# Patient Record
Sex: MALE | Race: WHITE | NOT HISPANIC OR LATINO | Employment: OTHER | URBAN - METROPOLITAN AREA
[De-identification: names, ages, dates, MRNs, and addresses within clinical notes are randomized per-mention and may not be internally consistent; named-entity substitution may affect disease eponyms.]

---

## 2019-05-16 ENCOUNTER — OFFICE VISIT (OUTPATIENT)
Dept: NEUROSURGERY | Facility: CLINIC | Age: 79
End: 2019-05-16

## 2019-05-16 VITALS
DIASTOLIC BLOOD PRESSURE: 65 MMHG | BODY MASS INDEX: 24.96 KG/M2 | HEART RATE: 67 BPM | WEIGHT: 159 LBS | SYSTOLIC BLOOD PRESSURE: 121 MMHG | HEIGHT: 67 IN

## 2019-05-16 DIAGNOSIS — M48.062 SPINAL STENOSIS, LUMBAR REGION, WITH NEUROGENIC CLAUDICATION: ICD-10-CM

## 2019-05-16 DIAGNOSIS — M51.36 DDD (DEGENERATIVE DISC DISEASE), LUMBAR: Primary | ICD-10-CM

## 2019-05-16 PROCEDURE — 99204 OFFICE O/P NEW MOD 45 MIN: CPT | Performed by: PHYSICIAN ASSISTANT

## 2019-05-16 NOTE — PROGRESS NOTES
Subjective   Patient ID: Boni Hernandez is a 78 y.o. male is being seen for consultation today at the request of Mark Abraham MD  For back and bilateral leg pain.  He denies any cause or injury.  He has not had any treatments.  Mr. Hernandez takes Tylenol 500 mg QID for pain.     Back Pain   This is a chronic problem. The current episode started more than 1 year ago. The problem occurs intermittently. The problem has been gradually worsening since onset. The pain is present in the lumbar spine. The quality of the pain is described as aching. Radiates to: both lower legs  The pain is at a severity of 6/10. The pain is moderate. The pain is the same all the time. Exacerbated by: walking  Associated symptoms include leg pain, numbness and tingling. Pertinent negatives include no bladder incontinence, bowel incontinence, perianal numbness or weakness. He has tried nothing for the symptoms.   Leg Pain    The incident occurred more than 1 week ago. There was no injury mechanism. The pain is present in the left leg and right leg. The quality of the pain is described as aching. The pain is at a severity of 9/10. The pain is severe. The pain has been worsening since onset. Associated symptoms include numbness and tingling. Treatments tried: stents in both legs. The treatment provided no relief.       The following portions of the patient's history were reviewed and updated as appropriate: allergies, current medications, past family history, past medical history, past social history, past surgical history and problem list.    Review of Systems   HENT: Positive for hearing loss.    Gastrointestinal: Negative for bowel incontinence.   Genitourinary: Negative for bladder incontinence and difficulty urinating.   Musculoskeletal: Positive for back pain (bilateral leg pain ).   Neurological: Positive for tingling and numbness. Negative for weakness.   Hematological: Bruises/bleeds easily.   All other systems reviewed and are  negative.      Objective   Physical Exam   Constitutional: He is oriented to person, place, and time. He appears well-developed and well-nourished.   HENT:   Head: Normocephalic and atraumatic.   Right Ear: External ear normal.   Left Ear: External ear normal.   Eyes: Conjunctivae and EOM are normal. Pupils are equal, round, and reactive to light. Right eye exhibits no discharge. Left eye exhibits no discharge.   Neck: Normal range of motion. Neck supple. No tracheal deviation present.   Cardiovascular: Intact distal pulses.   Pulmonary/Chest: Effort normal. No stridor. No respiratory distress.   Musculoskeletal: Normal range of motion. He exhibits no edema, tenderness or deformity.   Neurological: He is alert and oriented to person, place, and time. He has normal strength and normal reflexes. He displays no atrophy, no tremor and normal reflexes. No cranial nerve deficit or sensory deficit. He exhibits normal muscle tone. He displays a negative Romberg sign. He displays no seizure activity. Coordination and gait normal.   No long tract signs   Skin: Skin is warm and dry.   Psychiatric: He has a normal mood and affect. His behavior is normal. Judgment and thought content normal.   Nursing note and vitals reviewed.    Neurologic Exam     Mental Status   Oriented to person, place, and time.     Cranial Nerves     CN III, IV, VI   Pupils are equal, round, and reactive to light.  Extraocular motions are normal.     Motor Exam     Strength   Strength 5/5 throughout.       Assessment/Plan   Independent Review of Radiographic Studies:    I did review the lumbar spine MRI from [facility] on April 26, 2019.  It does show multilevel disc degeneration with a degenerative anterolisthesis at L4-L5.  There is multilevel facet arthropathy, ligamentum flavum hypertrophy and disc bulging with severe central stenosis at L4-L5 with severe bilateral foraminal narrowing at that level as well as severe bilateral  foraminal narrowing at L5-S1 and moderate bilateral foraminal narrowing at L3-L4.  Medical Decision Making:    Mr. Hernandez was referred to us by Dr. Abraham for a 5 to 10-year history of low back pain with bilateral buttock and lateral leg pain that began without accident or injury.  He does have a fairly extensive cardiac history and had cardiac stents as well as carotid endarterectomy about 2 years ago.  He is on Plavix and aspirin.  These are prescribed by Dr. Abraham.  He has never had any treatment.  The back and leg pain are quite severe with any prolonged standing or walking and do improve when sitting or laying down.  Leg pain is most bothersome.  He denies any focal weakness or bowel or bladder incontinence or significant numbness or tingling.    Did review the MRI with the patient and his wife and explained that his symptoms are certainly secondary to both the degenerative change as well as the spinal stenosis.  His radicular symptoms are most bothersome and he understands that surgery could be considered but given that he does not have any deficits I would recommend trying epidurals first.  We discussed the risks of bleeding, infection and headache.  He will need to call Dr. Abraham to get clearance to stop his aspirin and Plavix.  Once he obtains that clearance he will call us back and we can schedule lumbar epidurals and have him follow-up in about 2 months later.  If the epidural injections do not help then a myelogram and potential surgery would be the next step.  Boni was seen today for back pain and leg pain.    Diagnoses and all orders for this visit:    DDD (degenerative disc disease), lumbar    Spinal stenosis, lumbar region, with neurogenic claudication      Return if symptoms worsen or fail to improve, for pt to call after speaking with cardiologist.

## 2019-05-17 ENCOUNTER — TELEPHONE (OUTPATIENT)
Dept: NEUROSURGERY | Facility: CLINIC | Age: 79
End: 2019-05-17

## 2019-05-17 DIAGNOSIS — M51.36 DDD (DEGENERATIVE DISC DISEASE), LUMBAR: Primary | ICD-10-CM

## 2019-07-23 ENCOUNTER — OFFICE VISIT (OUTPATIENT)
Dept: NEUROSURGERY | Facility: CLINIC | Age: 79
End: 2019-07-23

## 2019-07-23 VITALS
HEIGHT: 67 IN | BODY MASS INDEX: 24.96 KG/M2 | WEIGHT: 159 LBS | DIASTOLIC BLOOD PRESSURE: 65 MMHG | HEART RATE: 66 BPM | SYSTOLIC BLOOD PRESSURE: 135 MMHG

## 2019-07-23 DIAGNOSIS — M51.36 DDD (DEGENERATIVE DISC DISEASE), LUMBAR: ICD-10-CM

## 2019-07-23 DIAGNOSIS — M48.062 SPINAL STENOSIS, LUMBAR REGION, WITH NEUROGENIC CLAUDICATION: Primary | ICD-10-CM

## 2019-07-23 PROCEDURE — 99213 OFFICE O/P EST LOW 20 MIN: CPT | Performed by: PHYSICIAN ASSISTANT

## 2019-07-23 RX ORDER — PANTOPRAZOLE SODIUM 40 MG/1
40 TABLET, DELAYED RELEASE ORAL DAILY
COMMUNITY
Start: 2019-05-20

## 2019-07-23 RX ORDER — ISOSORBIDE MONONITRATE 30 MG/1
TABLET, EXTENDED RELEASE ORAL
COMMUNITY
Start: 2019-06-27 | End: 2019-07-23 | Stop reason: SDUPTHER

## 2019-08-27 ENCOUNTER — TELEPHONE (OUTPATIENT)
Dept: NEUROSURGERY | Facility: CLINIC | Age: 79
End: 2019-08-27

## 2019-08-27 NOTE — TELEPHONE ENCOUNTER
Per pt at last visit surgery option was discussed and pt was told to call back when/if he decides on having surgery. Patient would like to speak with someone to discuss having surgery..Per Amy note pt will need to get updated imaging.    Thanks

## 2019-10-02 NOTE — PROGRESS NOTES
Subjective   Patient ID: Boni Hernandez is a 79 y.o. male is here today for follow-up. He last seen Amy 7/23/2019 for back and bilateral leg pain.  Today he is being seen for back and bilateral leg pain with numbness and tingling.    History of Present Illness     This patient has been having pain in his back with radiation into his legs.  This is been going on for about a year or so.  It is located in both of his legs and is not worse on one side or the other.  He has no difficulty with bowel or bladder control or other associated symptoms.  He has been treated with epidural blocks and medications.  Nothing has provided relief.    The following portions of the patient's history were reviewed and updated as appropriate: allergies, current medications, past family history, past medical history, past social history, past surgical history and problem list.    Review of Systems   Respiratory: Negative for chest tightness and shortness of breath.    Cardiovascular: Negative for chest pain.   Musculoskeletal: Positive for back pain.        Bilateral leg pain   Neurological: Positive for numbness.        Positive for tingling   All other systems reviewed and are negative.      Objective   Physical Exam   Constitutional: He is oriented to person, place, and time. He appears well-developed and well-nourished.   Eyes: EOM are normal. Pupils are equal, round, and reactive to light.   Neurological: He is oriented to person, place, and time. He has a normal Finger-Nose-Finger Test and a normal Heel to Shin Test. Gait normal.   Reflex Scores:       Tricep reflexes are 2+ on the right side and 2+ on the left side.       Bicep reflexes are 2+ on the right side and 2+ on the left side.       Brachioradialis reflexes are 2+ on the right side and 2+ on the left side.       Patellar reflexes are 2+ on the right side and 2+ on the left side.       Achilles reflexes are 2+ on the right side and 2+ on the left side.  Psychiatric: His  speech is normal.     Neurologic Exam     Mental Status   Oriented to person, place, and time.   Registration of memory: Good recent and remote memory.   Attention: normal. Concentration: normal.   Speech: speech is normal   Level of consciousness: alert  Knowledge: consistent with education.     Cranial Nerves     CN II   Visual fields full to confrontation.   Visual acuity: normal    CN III, IV, VI   Pupils are equal, round, and reactive to light.  Extraocular motions are normal.     CN V   Facial sensation intact.   Right corneal reflex: normal  Left corneal reflex: normal    CN VII   Facial expression full, symmetric.   Right facial weakness: none  Left facial weakness: none    CN VIII   Hearing: intact    CN IX, X   Palate: symmetric    CN XI   Right sternocleidomastoid strength: normal  Left sternocleidomastoid strength: normal    CN XII   Tongue: not atrophic  Tongue deviation: none    Motor Exam   Muscle bulk: normal  Right arm tone: normal  Left arm tone: normal  Right leg tone: normal  Left leg tone: normal    Strength   Strength 5/5 except as noted.     Sensory Exam   Light touch normal.     Gait, Coordination, and Reflexes     Gait  Gait: normal    Coordination   Finger to nose coordination: normal  Heel to shin coordination: normal    Reflexes   Right brachioradialis: 2+  Left brachioradialis: 2+  Right biceps: 2+  Left biceps: 2+  Right triceps: 2+  Left triceps: 2+  Right patellar: 2+  Left patellar: 2+  Right achilles: 2+  Left achilles: 2+  Right : 2+  Left : 2+      Assessment/Plan   Independent Review of Radiographic Studies:      I of his lumbar spine done in April of this year.  This shows a widely patent canal with a little foraminal stenosis at L5-S1.  At L4-5 there is a spondylolisthesis and severe stenosis with a disc herniation to the right side.  L3-4 looks pretty good as does L2-3 and L1-2.    Medical Decision Making:      I told the patient I see little option at this point but  to proceed with a lumbar myelogram.  I told the patient what a myelogram involves.  I explained that there is a 50% chance of developing a bad headache and nausea as a result of the test.  I explained that there is also a very small chance of infection, seizures, and bleeding.  I explained how we would treat a post myelogram headache including bedrest, caffeinated fluids, steroids, and blood patch.  The patient does ask to proceed.    Boni was seen today for back pain.    Diagnoses and all orders for this visit:    Spinal stenosis, lumbar region, with neurogenic claudication  -     Obtain Informed Consent; Standing  -     IR Myelogram Lumbar Spine; Future  -     CT Lumbar Spine With Intrathecal Contrast; Future  -     XR Spine Lumbar Complete With Flex & Ext; Future  -     No Lab Testing Needed; Standing  -     dexamethasone (DECADRON) 4 MG tablet; Take 2 tablets by mouth Take As Directed. Take both tablets by mouth 2 hours before myelogram    Spondylolisthesis of lumbar region      Return for After radiology test.

## 2019-10-03 ENCOUNTER — OFFICE VISIT (OUTPATIENT)
Dept: NEUROSURGERY | Facility: CLINIC | Age: 79
End: 2019-10-03

## 2019-10-03 VITALS — DIASTOLIC BLOOD PRESSURE: 59 MMHG | HEART RATE: 73 BPM | SYSTOLIC BLOOD PRESSURE: 112 MMHG

## 2019-10-03 DIAGNOSIS — M48.062 SPINAL STENOSIS, LUMBAR REGION, WITH NEUROGENIC CLAUDICATION: Primary | ICD-10-CM

## 2019-10-03 DIAGNOSIS — M43.16 SPONDYLOLISTHESIS OF LUMBAR REGION: ICD-10-CM

## 2019-10-03 PROCEDURE — 99213 OFFICE O/P EST LOW 20 MIN: CPT | Performed by: NEUROLOGICAL SURGERY

## 2019-10-03 RX ORDER — DEXAMETHASONE 4 MG/1
8 TABLET ORAL TAKE AS DIRECTED
Qty: 2 TABLET | Refills: 0 | Status: SHIPPED | OUTPATIENT
Start: 2019-10-03 | End: 2019-10-17 | Stop reason: HOSPADM

## 2019-10-17 ENCOUNTER — HOSPITAL ENCOUNTER (OUTPATIENT)
Dept: GENERAL RADIOLOGY | Facility: HOSPITAL | Age: 79
Discharge: HOME | End: 2019-10-17

## 2019-10-17 ENCOUNTER — HOSPITAL ENCOUNTER (OUTPATIENT)
Dept: CT IMAGING | Facility: HOSPITAL | Age: 79
Discharge: HOME | End: 2019-10-17
Admitting: NEUROLOGICAL SURGERY

## 2019-10-17 VITALS
HEIGHT: 67 IN | HEART RATE: 63 BPM | OXYGEN SATURATION: 97 % | TEMPERATURE: 97.1 F | DIASTOLIC BLOOD PRESSURE: 61 MMHG | BODY MASS INDEX: 25.11 KG/M2 | WEIGHT: 160 LBS | SYSTOLIC BLOOD PRESSURE: 122 MMHG | RESPIRATION RATE: 16 BRPM

## 2019-10-17 DIAGNOSIS — M48.062 SPINAL STENOSIS, LUMBAR REGION, WITH NEUROGENIC CLAUDICATION: ICD-10-CM

## 2019-10-17 PROCEDURE — 63710000001 HYDROCODONE-ACETAMINOPHEN 5-325 MG TABLET: Performed by: NEUROLOGICAL SURGERY

## 2019-10-17 PROCEDURE — 62304 MYELOGRAPHY LUMBAR INJECTION: CPT

## 2019-10-17 PROCEDURE — A9270 NON-COVERED ITEM OR SERVICE: HCPCS | Performed by: NEUROLOGICAL SURGERY

## 2019-10-17 PROCEDURE — 72240 MYELOGRAPHY NECK SPINE: CPT

## 2019-10-17 PROCEDURE — 0 IOPAMIDOL 41 % SOLUTION: Performed by: NEUROLOGICAL SURGERY

## 2019-10-17 PROCEDURE — 25010000003 LIDOCAINE 1 % SOLUTION: Performed by: NEUROLOGICAL SURGERY

## 2019-10-17 PROCEDURE — 72132 CT LUMBAR SPINE W/DYE: CPT

## 2019-10-17 PROCEDURE — 72114 X-RAY EXAM L-S SPINE BENDING: CPT

## 2019-10-17 RX ORDER — LIDOCAINE HYDROCHLORIDE 10 MG/ML
10 INJECTION, SOLUTION INFILTRATION; PERINEURAL ONCE
Status: COMPLETED | OUTPATIENT
Start: 2019-10-17 | End: 2019-10-17

## 2019-10-17 RX ORDER — HYDROCODONE BITARTRATE AND ACETAMINOPHEN 5; 325 MG/1; MG/1
1 TABLET ORAL EVERY 4 HOURS PRN
Status: DISCONTINUED | OUTPATIENT
Start: 2019-10-17 | End: 2019-10-18 | Stop reason: HOSPADM

## 2019-10-17 RX ORDER — ACETAMINOPHEN 325 MG/1
650 TABLET ORAL EVERY 4 HOURS PRN
Status: DISCONTINUED | OUTPATIENT
Start: 2019-10-17 | End: 2019-10-18 | Stop reason: HOSPADM

## 2019-10-17 RX ADMIN — LIDOCAINE HYDROCHLORIDE 5 ML: 10 INJECTION, SOLUTION INFILTRATION; PERINEURAL at 07:10

## 2019-10-17 RX ADMIN — HYDROCODONE BITARTATE AND ACETAMINOPHEN 1 TABLET: 5; 325 TABLET ORAL at 07:32

## 2019-10-17 RX ADMIN — IOPAMIDOL 20 ML: 408 INJECTION, SOLUTION INTRATHECAL at 07:11

## 2019-10-17 NOTE — DISCHARGE INSTRUCTIONS
EDUCATION /DISCHARGE INSTRUCTIONS:    A myelogram is a special radiology procedure of the spinal cord, spinal nerves and other related structures.  You will be awake during the examination.  An area of your lower back will be cleansed with an antiseptic solution.  The physician will inject a numbing medication in your lower back.  While your back is numb, a needle will be placed in the lower back area.  A small amount of spinal fluid may be withdrawn and sent to the lab if ordered by your physician. While the needle is in the back, an injection of a contrast material (xray dye) will be given through the needle.  The contrast material will allow the physician to see the spinal cord and spinal nerves.  Once injected, the needle will be removed and a band aid will be placed over the injection site.  The table will be tilted during the process to allow the contrast material to flow to particular areas in the spine.  Following the injection and xrays, you will be taken to the CT scan where more pictures will be taken. After the procedure is finished, the contrast material will be absorbed by your body and eliminated through your kidneys.  The radiologist will study and interpret your myelogram and send the results to your physician.  Procedure risks of a myelogram include, but are not limited to:  *  Bleeding   *  seizure  *  Infection   *  Headache, possibly severe requiring  *  Contrast reaction      a blood patch  *  Nerve or cord injury  *  Paralysis and death  Benefits of the procedure:  *  Best examination for delineating pathology related to spinal cord compression from a    disc and/or nerve root compression  Alternatives to the procedure:  MRI - a non invasive procedure requiring intravenous contrast injection.  Cannot be done on patients with certain pacemakers or metal in the body.  MRI risks include possible reaction to the contrast material, movement of metal located in the body.Benefit to MRI:  Non-invasive  and usually painless procedure.  THIS EDUCATION INFORMATION WAS REVIEWED PRIOR TO PROCEDURE AND CONSENT. Patient initials________________Time______0640____________    24 hour rest period ends ____1100________________.  Important information following your myelogram:  * ACTIVITY:   *  Lie down with your head elevated no more than 2 pillows high today & tonight  *  Sit up to eat your meals and use the restroom, otherwise, lie down.  *  Remain less active for two to three days.  *  Do not drive for 48 hours following a myelogram  *  You may remove the bandage and shower in the morning  *  Increase your fluids for the next 24 hours.  Caffeinated drinks are encouraged.    Resume taking blood thinner or aspirin on _resume Plavix (clopidergrel) on 10/18/19 after 1100____    CALL YOUR PHYSICIAN FOR THE FOLLOWING:  * Pain at the injection site  * Reddness, swelling, bruising or drainage at the injection site.  * A fever by mouth of 101.0 or any new symptoms  Headaches are a common side effect after a myelogram.  If you get a headache, you should stay flat in bed and drink plenty of fluids. If the headache persist and does not go away with rest/medication, CALL Dr. Alex at [phone]

## 2019-10-17 NOTE — NURSING NOTE
To car by W/C with Jessica REA.  No problems or concerns noted.  Granddaughter is driving he and his wife home.

## 2019-10-18 ENCOUNTER — TELEPHONE (OUTPATIENT)
Dept: INTERVENTIONAL RADIOLOGY/VASCULAR | Facility: HOSPITAL | Age: 79
End: 2019-10-18

## 2019-10-28 NOTE — PROGRESS NOTES
Subjective   Patient ID: Boni Hernandez is a 79 y.o. male is here today for follow-up with a new Lumbar Myelogram that was ordered at his last office visit 10/3/2019 for back and bilateral leg pain with numbness and tingling.  Patient tolerated procedure fine.  Patient states that his symptoms of low back and bilateral leg pain are unchanged.  He has numbness/tingling bilateral legs and weakness in both legs    History of Present Illness     This patient returns today.  He continues with pain as well as numbness and tingling in his legs and some generalized weakness.    The following portions of the patient's history were reviewed and updated as appropriate: allergies, current medications, past family history, past medical history, past social history, past surgical history and problem list.    Review of Systems   Musculoskeletal: Positive for arthralgias and back pain. Negative for gait problem and myalgias.   Neurological: Positive for weakness and numbness.   All other systems reviewed and are negative.      Objective   Physical Exam   Constitutional: He is oriented to person, place, and time. He appears well-developed and well-nourished.   Neurological: He is oriented to person, place, and time.     Neurologic Exam     Mental Status   Oriented to person, place, and time.       Assessment/Plan   Independent Review of Radiographic Studies:      I reviewed his plain films, myelogram, and CT scan myself.  The plain films show multilevel degenerative disease.  There does appear to be a grade 1 spondylolisthesis of L4 on L5 but no evidence of abnormal movement on flexion and extension.  On the myelogram itself there is an initial subtotal block at the L4-5 level.  On standing films the contrast still does not pass very well.  The other levels mostly look okay.  On the post myelographic CT scan the lower thoracic spine and upper lumbar spine look okay down to the L2 vertebral body.  At L2-3 there is a little right  lateral recess stenosis but not severe.  L3-4 also shows some lateral recess stenosis with very mild.  L4-5 shows no contrast at all in the contrast really never did pass even on the CAT scan at L5-S1 which generally looks okay.  There may be some foraminal stenosis particularly on the left.    Medical Decision Making:      I told the patient and his wife about the imaging.  I told him that from my point of view I would tend to proceed with a L4-5 laminectomy and fusion if he wishes to do anything at all.  I do not see much option to that in terms of nonsurgical treatment.  I told the patient about the risks, complications and expected outcome of the lumbar surgery.  I explained that there was an 80% chance of getting rid of the pain in the leg.  I explained that there would still be back pain after the surgery.  Initially this will be quite severe but will improve over time.  There is a 2 or 3% chance of infection, bleeding, CSF leak, damage to the nerve as a result of surgery, paralysis, as well as anesthetic risk.  There is a 10% chance of recurrent problems.  There is a 10% chance of nonunion or failure of the instrumentation.  We discussed the postoperative hospital and home course.  The patient does ask proceed.    He will need to be scheduled for a: Lumbar 4 to lumbar 5 laminectomy with a posterior lateral fusion and instrumentation and possible interbody fusion    Boni was seen today for back pain, leg pain, extremity weakness and numbness.    Diagnoses and all orders for this visit:    Spondylolisthesis of lumbar region    Spinal stenosis, lumbar region, with neurogenic claudication      Return for 2-3 week post op, Recheck and call after treatment or consultation.

## 2019-10-29 ENCOUNTER — PREP FOR SURGERY (OUTPATIENT)
Dept: OTHER | Facility: HOSPITAL | Age: 79
End: 2019-10-29

## 2019-10-29 ENCOUNTER — OFFICE VISIT (OUTPATIENT)
Dept: NEUROSURGERY | Facility: CLINIC | Age: 79
End: 2019-10-29

## 2019-10-29 VITALS
HEIGHT: 67 IN | SYSTOLIC BLOOD PRESSURE: 130 MMHG | BODY MASS INDEX: 25.11 KG/M2 | DIASTOLIC BLOOD PRESSURE: 64 MMHG | WEIGHT: 160 LBS | HEART RATE: 74 BPM

## 2019-10-29 DIAGNOSIS — M43.16 SPONDYLOLISTHESIS OF LUMBAR REGION: Primary | ICD-10-CM

## 2019-10-29 DIAGNOSIS — M48.062 SPINAL STENOSIS, LUMBAR REGION, WITH NEUROGENIC CLAUDICATION: ICD-10-CM

## 2019-10-29 DIAGNOSIS — M48.062 SPINAL STENOSIS, LUMBAR REGION, WITH NEUROGENIC CLAUDICATION: Primary | ICD-10-CM

## 2019-10-29 PROCEDURE — 99213 OFFICE O/P EST LOW 20 MIN: CPT | Performed by: NEUROLOGICAL SURGERY

## 2019-10-29 RX ORDER — CEFAZOLIN SODIUM 2 G/100ML
2 INJECTION, SOLUTION INTRAVENOUS ONCE
Status: CANCELLED | OUTPATIENT
Start: 2019-11-13 | End: 2019-10-29

## 2019-11-06 ENCOUNTER — APPOINTMENT (OUTPATIENT)
Dept: PREADMISSION TESTING | Facility: HOSPITAL | Age: 79
End: 2019-11-06

## 2019-11-06 VITALS
WEIGHT: 161.5 LBS | OXYGEN SATURATION: 98 % | TEMPERATURE: 97.7 F | HEART RATE: 70 BPM | SYSTOLIC BLOOD PRESSURE: 104 MMHG | DIASTOLIC BLOOD PRESSURE: 62 MMHG | HEIGHT: 67 IN | BODY MASS INDEX: 25.35 KG/M2

## 2019-11-06 LAB
ANION GAP SERPL CALCULATED.3IONS-SCNC: 6.6 MMOL/L (ref 5–15)
BUN BLD-MCNC: 7 MG/DL (ref 8–23)
BUN/CREAT SERPL: 7.4 (ref 7–25)
CALCIUM SPEC-SCNC: 8.8 MG/DL (ref 8.6–10.5)
CHLORIDE SERPL-SCNC: 101 MMOL/L (ref 98–107)
CO2 SERPL-SCNC: 28.4 MMOL/L (ref 22–29)
CREAT BLD-MCNC: 0.94 MG/DL (ref 0.76–1.27)
DEPRECATED RDW RBC AUTO: 42 FL (ref 37–54)
ERYTHROCYTE [DISTWIDTH] IN BLOOD BY AUTOMATED COUNT: 12.4 % (ref 12.3–15.4)
GFR SERPL CREATININE-BSD FRML MDRD: 77 ML/MIN/1.73
GLUCOSE BLD-MCNC: 101 MG/DL (ref 65–99)
HCT VFR BLD AUTO: 41.3 % (ref 37.5–51)
HGB BLD-MCNC: 13.2 G/DL (ref 13–17.7)
MCH RBC QN AUTO: 29.3 PG (ref 26.6–33)
MCHC RBC AUTO-ENTMCNC: 32 G/DL (ref 31.5–35.7)
MCV RBC AUTO: 91.8 FL (ref 79–97)
PLATELET # BLD AUTO: 238 10*3/MM3 (ref 140–450)
PMV BLD AUTO: 9.7 FL (ref 6–12)
POTASSIUM BLD-SCNC: 4.6 MMOL/L (ref 3.5–5.2)
RBC # BLD AUTO: 4.5 10*6/MM3 (ref 4.14–5.8)
SODIUM BLD-SCNC: 136 MMOL/L (ref 136–145)
WBC NRBC COR # BLD: 5.63 10*3/MM3 (ref 3.4–10.8)

## 2019-11-06 PROCEDURE — 80048 BASIC METABOLIC PNL TOTAL CA: CPT | Performed by: NEUROLOGICAL SURGERY

## 2019-11-06 PROCEDURE — 85027 COMPLETE CBC AUTOMATED: CPT | Performed by: NEUROLOGICAL SURGERY

## 2019-11-06 PROCEDURE — 36415 COLL VENOUS BLD VENIPUNCTURE: CPT

## 2019-11-06 RX ORDER — ACETAMINOPHEN 500 MG
1000 TABLET ORAL EVERY 6 HOURS PRN
COMMUNITY

## 2019-11-06 RX ORDER — CHLORHEXIDINE GLUCONATE 500 MG/1
1 CLOTH TOPICAL TAKE AS DIRECTED
Status: ON HOLD | COMMUNITY
End: 2019-11-13

## 2019-11-06 RX ORDER — ISOSORBIDE MONONITRATE 30 MG/1
60 TABLET, EXTENDED RELEASE ORAL DAILY
COMMUNITY
End: 2022-12-13 | Stop reason: HOSPADM

## 2019-11-06 RX ORDER — NITROGLYCERIN 0.4 MG/1
0.4 TABLET SUBLINGUAL
COMMUNITY

## 2019-11-06 NOTE — DISCHARGE INSTRUCTIONS
Take the following medications the morning of surgery with a small sip of water:    ISOSORBIDE, PANTOPRAZOLE AND ACETAMINOPHEN IF NEEDED    Arrive to hospital on your day of surgery at 11:30 AM.    General Instructions:  • Do not eat solid food after midnight the night before surgery.  • You may drink clear liquids day of surgery but must stop at least one hour before your hospital arrival time.  • It is beneficial for you to have a clear drink that contains carbohydrates the day of surgery.  We suggest a 12 to 20 ounce bottle of Gatorade or Powerade for non-diabetic patients or a 12 to 20 ounce bottle of G2 or Powerade Zero for diabetic patients. (Pediatric patients, are not advised to drink a 12 to 20 ounce carbohydrate drink)    Clear liquids are liquids you can see through.  Nothing red in color.     Plain water                               Sports drinks  Sodas                                   Gelatin (Jell-O)  Fruit juices without pulp such as white grape juice and apple juice  Popsicles that contain no fruit or yogurt  Tea or coffee (no cream or milk added)  Gatorade / Powerade  G2 / Powerade Zero    • Infants may have breast milk up to four hours before surgery.  • Infants drinking formula may drink formula up to six hours before surgery.   • Patients who avoid smoking, chewing tobacco and alcohol for 4 weeks prior to surgery have a reduced risk of post-operative complications.  Quit smoking as many days before surgery as you can.  • Do not smoke, use chewing tobacco or drink alcohol the day of surgery.   • If applicable bring your C-PAP/ BI-PAP machine.  • Bring any papers given to you in the doctor’s office.  • Wear clean comfortable clothes.  • Do not wear contact lenses, false eyelashes or make-up.  Bring a case for your glasses.   • Bring crutches or walker if applicable.  • Remove all piercings.  Leave jewelry and any other valuables at home.  • Hair extensions with metal clips must be removed prior  to surgery.  • The Pre-Admission Testing nurse will instruct you to bring medications if unable to obtain an accurate list in Pre-Admission Testing.        If you were given a blood bank ID arm band remember to bring it with you the day of surgery.    Preventing a Surgical Site Infection:  • For 2 to 3 days before surgery, avoid shaving with a razor because the razor can irritate skin and make it easier to develop an infection.    • Any areas of open skin can increase the risk of a post-operative wound infection by allowing bacteria to enter and travel throughout the body.  Notify your surgeon if you have any skin wounds / rashes even if it is not near the expected surgical site.  The area will need assessed to determine if surgery should be delayed until it is healed.  • The night prior to surgery sleep in a clean bed with clean clothing.  Do not allow pets to sleep with you.  • Shower on the morning of surgery using a fresh bar of anti-bacterial soap (such as Dial) and clean washcloth.  Dry with a clean towel and dress in clean clothing.  • Ask your surgeon if you will be receiving antibiotics prior to surgery.  • Make sure you, your family, and all healthcare providers clean their hands with soap and water or an alcohol based hand  before caring for you or your wound.    Day of surgery:  Your arrival time is approximately two hours before your scheduled surgery time.  Upon arrival, a Pre-op nurse and Anesthesiologist will review your health history, obtain vital signs, and answer questions you may have.  The only belongings needed at this time will be a list of your home medications and if applicable your C-PAP/BI-PAP machine.  If you are staying overnight your family can leave the rest of your belongings in the car and bring them to your room later.  A Pre-op nurse will start an IV and you may receive medication in preparation for surgery, including something to help you relax.  Your family will be able  to see you in the Pre-op area.  Two visitors at a time will be allowed in the Pre-op room.  While you are in surgery your family should notify the waiting room  if they leave the waiting room area and provide a contact phone number.    Please be aware that surgery does come with discomfort.  We want to make every effort to control your discomfort so please discuss any uncontrolled symptoms with your nurse.   Your doctor will most likely have prescribed pain medications.      If you are going home after surgery you will receive individualized written care instructions before being discharged.  A responsible adult must drive you to and from the hospital on the day of your surgery and stay with you for 24 hours.    If you are staying overnight following surgery, you will be transported to your hospital room following the recovery period.  [facility] has all private rooms.    You have received a list of surgical assistants for your reference.  If you have any questions please call Pre-Admission Testing at [phone].  Deductibles and co-payments are collected on the day of service. Please be prepared to pay the required co-pay, deductible or deposit on the day of service as defined by your plan.    2% CHLORAHEXIDINE GLUCONATE* CLOTH  Preparing or “prepping” skin before surgery can reduce the risk of infection at the surgical site. To make the process easier, [facility] has chosen disposable cloths moistened with a rinse-free, 2% Chlorhexidine Gluconate (CHG) antiseptic solution. The steps below outline the prepping process and should be carefully followed.        Use the prep cloth on the area that is circled in the diagram             Directions Night before Surgery  1) Shower using a fresh bar of anti-bacterial soap (such as Dial) and clean washcloth.  Use a clean towel to completely dry your skin.  2) Do not use any lotions, oils or creams on your skin.  3) Open the package and  remove 1 cloth, wipe your skin for 30 seconds in a circular motion.  Allow to dry for 3 minutes.  4) Repeat #3 with second cloth.  5) Do not touch your eyes, ears, or mouth with the prep cloth.  6) Allow the wet prep solution to air dry.  7) Discard the prep cloth and wash your hands with soap and water.   8) Dress in clean bed clothes and sleep on fresh clean bed sheets.   9) You may experience some temporary itching after the prep.    Directions Day of Surgery  1) Repeat steps 1,2,3,4,5,6,7, and 9.   2) Dress in clean clothes before coming to the hospital.

## 2019-11-13 ENCOUNTER — APPOINTMENT (OUTPATIENT)
Dept: GENERAL RADIOLOGY | Facility: HOSPITAL | Age: 79
End: 2019-11-13

## 2019-11-13 ENCOUNTER — ANESTHESIA EVENT (OUTPATIENT)
Dept: PERIOP | Facility: HOSPITAL | Age: 79
End: 2019-11-13

## 2019-11-13 ENCOUNTER — ANESTHESIA (OUTPATIENT)
Dept: PERIOP | Facility: HOSPITAL | Age: 79
End: 2019-11-13

## 2019-11-13 ENCOUNTER — HOSPITAL ENCOUNTER (INPATIENT)
Facility: HOSPITAL | Age: 79
LOS: 2 days | Discharge: HOME | End: 2019-11-15
Attending: NEUROLOGICAL SURGERY | Admitting: NEUROLOGICAL SURGERY

## 2019-11-13 DIAGNOSIS — R53.1 GENERALIZED WEAKNESS: ICD-10-CM

## 2019-11-13 DIAGNOSIS — M43.16 SPONDYLOLISTHESIS OF LUMBAR REGION: Primary | ICD-10-CM

## 2019-11-13 DIAGNOSIS — M48.062 SPINAL STENOSIS, LUMBAR REGION, WITH NEUROGENIC CLAUDICATION: ICD-10-CM

## 2019-11-13 PROCEDURE — 0SG007J FUSION OF LUMBAR VERTEBRAL JOINT WITH AUTOLOGOUS TISSUE SUBSTITUTE, POSTERIOR APPROACH, ANTERIOR COLUMN, OPEN APPROACH: ICD-10-PCS | Performed by: NEUROLOGICAL SURGERY

## 2019-11-13 PROCEDURE — C1713 ANCHOR/SCREW BN/BN,TIS/BN: HCPCS | Performed by: NEUROLOGICAL SURGERY

## 2019-11-13 PROCEDURE — 25010000002 FENTANYL CITRATE (PF) 100 MCG/2ML SOLUTION: Performed by: ANESTHESIOLOGY

## 2019-11-13 PROCEDURE — 63047 LAM FACETEC & FORAMOT LUMBAR: CPT | Performed by: NEUROLOGICAL SURGERY

## 2019-11-13 PROCEDURE — 94799 UNLISTED PULMONARY SVC/PX: CPT

## 2019-11-13 PROCEDURE — 25010000002 MIDAZOLAM PER 1 MG: Performed by: ANESTHESIOLOGY

## 2019-11-13 PROCEDURE — 25010000002 FENTANYL CITRATE (PF) 100 MCG/2ML SOLUTION: Performed by: NURSE ANESTHETIST, CERTIFIED REGISTERED

## 2019-11-13 PROCEDURE — 22840 INSERT SPINE FIXATION DEVICE: CPT | Performed by: NEUROLOGICAL SURGERY

## 2019-11-13 PROCEDURE — 72100 X-RAY EXAM L-S SPINE 2/3 VWS: CPT

## 2019-11-13 PROCEDURE — 25810000003 SODIUM CHLORIDE 0.9 % WITH KCL 20 MEQ 20-0.9 MEQ/L-% SOLUTION: Performed by: NEUROLOGICAL SURGERY

## 2019-11-13 PROCEDURE — 76000 FLUOROSCOPY <1 HR PHYS/QHP: CPT

## 2019-11-13 PROCEDURE — 25010000002 PHENYLEPHRINE PER 1 ML: Performed by: NURSE ANESTHETIST, CERTIFIED REGISTERED

## 2019-11-13 PROCEDURE — 25010000002 PROPOFOL 10 MG/ML EMULSION: Performed by: NURSE ANESTHETIST, CERTIFIED REGISTERED

## 2019-11-13 PROCEDURE — 25010000002 ONDANSETRON PER 1 MG: Performed by: NURSE ANESTHETIST, CERTIFIED REGISTERED

## 2019-11-13 PROCEDURE — 25010000002 MORPHINE PER 10 MG: Performed by: NEUROLOGICAL SURGERY

## 2019-11-13 PROCEDURE — 25010000003 CEFAZOLIN IN DEXTROSE 2-4 GM/100ML-% SOLUTION: Performed by: NEUROLOGICAL SURGERY

## 2019-11-13 PROCEDURE — 22612 ARTHRD PST TQ 1NTRSPC LUMBAR: CPT | Performed by: NEUROLOGICAL SURGERY

## 2019-11-13 PROCEDURE — 25010000002 DEXAMETHASONE PER 1 MG: Performed by: NURSE ANESTHETIST, CERTIFIED REGISTERED

## 2019-11-13 DEVICE — SET SCREW 5440030 4.75 TI NS BRK OFF
Type: IMPLANTABLE DEVICE | Site: SPINE LUMBAR | Status: FUNCTIONAL
Brand: CD HORIZON® SPINAL SYSTEM

## 2019-11-13 DEVICE — SSC BONE WAX
Type: IMPLANTABLE DEVICE | Status: FUNCTIONAL
Brand: SSC BONE WAX

## 2019-11-13 DEVICE — GRFT BONE MAGNIFUSE PC 1.75X5CM: Type: IMPLANTABLE DEVICE | Status: FUNCTIONAL

## 2019-11-13 RX ORDER — HYDRALAZINE HYDROCHLORIDE 20 MG/ML
5 INJECTION INTRAMUSCULAR; INTRAVENOUS
Status: DISCONTINUED | OUTPATIENT
Start: 2019-11-13 | End: 2019-11-13 | Stop reason: HOSPADM

## 2019-11-13 RX ORDER — DIPHENHYDRAMINE HYDROCHLORIDE 50 MG/ML
12.5 INJECTION INTRAMUSCULAR; INTRAVENOUS
Status: DISCONTINUED | OUTPATIENT
Start: 2019-11-13 | End: 2019-11-13 | Stop reason: HOSPADM

## 2019-11-13 RX ORDER — SODIUM CHLORIDE 0.9 % (FLUSH) 0.9 %
10 SYRINGE (ML) INJECTION AS NEEDED
Status: DISCONTINUED | OUTPATIENT
Start: 2019-11-13 | End: 2019-11-15 | Stop reason: HOSPADM

## 2019-11-13 RX ORDER — ONDANSETRON 2 MG/ML
4 INJECTION INTRAMUSCULAR; INTRAVENOUS ONCE AS NEEDED
Status: DISCONTINUED | OUTPATIENT
Start: 2019-11-13 | End: 2019-11-13 | Stop reason: HOSPADM

## 2019-11-13 RX ORDER — LIDOCAINE HYDROCHLORIDE 10 MG/ML
0.5 INJECTION, SOLUTION EPIDURAL; INFILTRATION; INTRACAUDAL; PERINEURAL ONCE AS NEEDED
Status: DISCONTINUED | OUTPATIENT
Start: 2019-11-13 | End: 2019-11-13 | Stop reason: HOSPADM

## 2019-11-13 RX ORDER — FAMOTIDINE 10 MG/ML
20 INJECTION, SOLUTION INTRAVENOUS ONCE
Status: COMPLETED | OUTPATIENT
Start: 2019-11-13 | End: 2019-11-13

## 2019-11-13 RX ORDER — ONDANSETRON 4 MG/1
4 TABLET, FILM COATED ORAL EVERY 6 HOURS PRN
Status: DISCONTINUED | OUTPATIENT
Start: 2019-11-13 | End: 2019-11-15 | Stop reason: HOSPADM

## 2019-11-13 RX ORDER — SODIUM CHLORIDE, SODIUM LACTATE, POTASSIUM CHLORIDE, CALCIUM CHLORIDE 600; 310; 30; 20 MG/100ML; MG/100ML; MG/100ML; MG/100ML
9 INJECTION, SOLUTION INTRAVENOUS CONTINUOUS
Status: DISCONTINUED | OUTPATIENT
Start: 2019-11-13 | End: 2019-11-13

## 2019-11-13 RX ORDER — DIPHENHYDRAMINE HCL 25 MG
25 CAPSULE ORAL
Status: DISCONTINUED | OUTPATIENT
Start: 2019-11-13 | End: 2019-11-13 | Stop reason: HOSPADM

## 2019-11-13 RX ORDER — CEFAZOLIN SODIUM 2 G/100ML
2 INJECTION, SOLUTION INTRAVENOUS EVERY 8 HOURS
Status: COMPLETED | OUTPATIENT
Start: 2019-11-13 | End: 2019-11-14

## 2019-11-13 RX ORDER — ISOSORBIDE MONONITRATE 30 MG/1
30 TABLET, EXTENDED RELEASE ORAL DAILY
Status: DISCONTINUED | OUTPATIENT
Start: 2019-11-13 | End: 2019-11-15 | Stop reason: HOSPADM

## 2019-11-13 RX ORDER — PROMETHAZINE HYDROCHLORIDE 25 MG/ML
12.5 INJECTION, SOLUTION INTRAMUSCULAR; INTRAVENOUS ONCE AS NEEDED
Status: DISCONTINUED | OUTPATIENT
Start: 2019-11-13 | End: 2019-11-13 | Stop reason: HOSPADM

## 2019-11-13 RX ORDER — PROPOFOL 10 MG/ML
VIAL (ML) INTRAVENOUS AS NEEDED
Status: DISCONTINUED | OUTPATIENT
Start: 2019-11-13 | End: 2019-11-13 | Stop reason: SURG

## 2019-11-13 RX ORDER — ROCURONIUM BROMIDE 10 MG/ML
INJECTION, SOLUTION INTRAVENOUS AS NEEDED
Status: DISCONTINUED | OUTPATIENT
Start: 2019-11-13 | End: 2019-11-13 | Stop reason: SURG

## 2019-11-13 RX ORDER — MIDAZOLAM HYDROCHLORIDE 1 MG/ML
1 INJECTION INTRAMUSCULAR; INTRAVENOUS
Status: DISCONTINUED | OUTPATIENT
Start: 2019-11-13 | End: 2019-11-13 | Stop reason: HOSPADM

## 2019-11-13 RX ORDER — MIDAZOLAM HYDROCHLORIDE 1 MG/ML
2 INJECTION INTRAMUSCULAR; INTRAVENOUS
Status: DISCONTINUED | OUTPATIENT
Start: 2019-11-13 | End: 2019-11-13 | Stop reason: HOSPADM

## 2019-11-13 RX ORDER — CARVEDILOL 6.25 MG/1
6.25 TABLET ORAL EVERY EVENING
Status: DISCONTINUED | OUTPATIENT
Start: 2019-11-13 | End: 2019-11-15 | Stop reason: HOSPADM

## 2019-11-13 RX ORDER — ONDANSETRON 2 MG/ML
INJECTION INTRAMUSCULAR; INTRAVENOUS AS NEEDED
Status: DISCONTINUED | OUTPATIENT
Start: 2019-11-13 | End: 2019-11-13 | Stop reason: SURG

## 2019-11-13 RX ORDER — CEFAZOLIN SODIUM 2 G/100ML
2 INJECTION, SOLUTION INTRAVENOUS ONCE
Status: COMPLETED | OUTPATIENT
Start: 2019-11-13 | End: 2019-11-13

## 2019-11-13 RX ORDER — EPHEDRINE SULFATE 50 MG/ML
5 INJECTION, SOLUTION INTRAVENOUS ONCE AS NEEDED
Status: DISCONTINUED | OUTPATIENT
Start: 2019-11-13 | End: 2019-11-13 | Stop reason: HOSPADM

## 2019-11-13 RX ORDER — SODIUM CHLORIDE 0.9 % (FLUSH) 0.9 %
3 SYRINGE (ML) INJECTION EVERY 12 HOURS SCHEDULED
Status: DISCONTINUED | OUTPATIENT
Start: 2019-11-13 | End: 2019-11-13 | Stop reason: HOSPADM

## 2019-11-13 RX ORDER — ONDANSETRON 2 MG/ML
4 INJECTION INTRAMUSCULAR; INTRAVENOUS EVERY 6 HOURS PRN
Status: DISCONTINUED | OUTPATIENT
Start: 2019-11-13 | End: 2019-11-15 | Stop reason: HOSPADM

## 2019-11-13 RX ORDER — SODIUM CHLORIDE AND POTASSIUM CHLORIDE 150; 900 MG/100ML; MG/100ML
100 INJECTION, SOLUTION INTRAVENOUS CONTINUOUS
Status: DISCONTINUED | OUTPATIENT
Start: 2019-11-13 | End: 2019-11-15

## 2019-11-13 RX ORDER — HYDROMORPHONE HYDROCHLORIDE 1 MG/ML
0.5 INJECTION, SOLUTION INTRAMUSCULAR; INTRAVENOUS; SUBCUTANEOUS
Status: DISCONTINUED | OUTPATIENT
Start: 2019-11-13 | End: 2019-11-13 | Stop reason: HOSPADM

## 2019-11-13 RX ORDER — SODIUM CHLORIDE 0.9 % (FLUSH) 0.9 %
3-10 SYRINGE (ML) INJECTION AS NEEDED
Status: DISCONTINUED | OUTPATIENT
Start: 2019-11-13 | End: 2019-11-13 | Stop reason: HOSPADM

## 2019-11-13 RX ORDER — MORPHINE SULFATE 2 MG/ML
2 INJECTION, SOLUTION INTRAMUSCULAR; INTRAVENOUS EVERY 4 HOURS PRN
Status: DISCONTINUED | OUTPATIENT
Start: 2019-11-13 | End: 2019-11-15 | Stop reason: HOSPADM

## 2019-11-13 RX ORDER — LABETALOL HYDROCHLORIDE 5 MG/ML
5 INJECTION, SOLUTION INTRAVENOUS
Status: DISCONTINUED | OUTPATIENT
Start: 2019-11-13 | End: 2019-11-13 | Stop reason: HOSPADM

## 2019-11-13 RX ORDER — FLUMAZENIL 0.1 MG/ML
0.2 INJECTION INTRAVENOUS AS NEEDED
Status: DISCONTINUED | OUTPATIENT
Start: 2019-11-13 | End: 2019-11-13 | Stop reason: HOSPADM

## 2019-11-13 RX ORDER — ATORVASTATIN CALCIUM 80 MG/1
80 TABLET, FILM COATED ORAL NIGHTLY
Status: DISCONTINUED | OUTPATIENT
Start: 2019-11-13 | End: 2019-11-15 | Stop reason: HOSPADM

## 2019-11-13 RX ORDER — ACETAMINOPHEN 500 MG
1000 TABLET ORAL EVERY 6 HOURS PRN
Status: DISCONTINUED | OUTPATIENT
Start: 2019-11-13 | End: 2019-11-15 | Stop reason: HOSPADM

## 2019-11-13 RX ORDER — PROMETHAZINE HYDROCHLORIDE 25 MG/ML
6.25 INJECTION, SOLUTION INTRAMUSCULAR; INTRAVENOUS
Status: DISCONTINUED | OUTPATIENT
Start: 2019-11-13 | End: 2019-11-13 | Stop reason: HOSPADM

## 2019-11-13 RX ORDER — ACETAMINOPHEN 325 MG/1
650 TABLET ORAL ONCE AS NEEDED
Status: DISCONTINUED | OUTPATIENT
Start: 2019-11-13 | End: 2019-11-13 | Stop reason: HOSPADM

## 2019-11-13 RX ORDER — LISINOPRIL 10 MG/1
10 TABLET ORAL DAILY
Status: DISCONTINUED | OUTPATIENT
Start: 2019-11-13 | End: 2019-11-15 | Stop reason: HOSPADM

## 2019-11-13 RX ORDER — DEXAMETHASONE SODIUM PHOSPHATE 10 MG/ML
INJECTION INTRAMUSCULAR; INTRAVENOUS AS NEEDED
Status: DISCONTINUED | OUTPATIENT
Start: 2019-11-13 | End: 2019-11-13 | Stop reason: SURG

## 2019-11-13 RX ORDER — TAMSULOSIN HYDROCHLORIDE 0.4 MG/1
0.4 CAPSULE ORAL NIGHTLY
Status: DISCONTINUED | OUTPATIENT
Start: 2019-11-13 | End: 2019-11-15 | Stop reason: HOSPADM

## 2019-11-13 RX ORDER — FENTANYL CITRATE 50 UG/ML
50 INJECTION, SOLUTION INTRAMUSCULAR; INTRAVENOUS
Status: DISCONTINUED | OUTPATIENT
Start: 2019-11-13 | End: 2019-11-13 | Stop reason: HOSPADM

## 2019-11-13 RX ORDER — NALOXONE HCL 0.4 MG/ML
0.2 VIAL (ML) INJECTION AS NEEDED
Status: DISCONTINUED | OUTPATIENT
Start: 2019-11-13 | End: 2019-11-13 | Stop reason: HOSPADM

## 2019-11-13 RX ORDER — LIDOCAINE HYDROCHLORIDE 20 MG/ML
INJECTION, SOLUTION INFILTRATION; PERINEURAL AS NEEDED
Status: DISCONTINUED | OUTPATIENT
Start: 2019-11-13 | End: 2019-11-13 | Stop reason: SURG

## 2019-11-13 RX ORDER — NALOXONE HCL 0.4 MG/ML
0.4 VIAL (ML) INJECTION
Status: DISCONTINUED | OUTPATIENT
Start: 2019-11-13 | End: 2019-11-15 | Stop reason: HOSPADM

## 2019-11-13 RX ORDER — NITROGLYCERIN 0.4 MG/1
0.4 TABLET SUBLINGUAL
Status: DISCONTINUED | OUTPATIENT
Start: 2019-11-13 | End: 2019-11-15 | Stop reason: HOSPADM

## 2019-11-13 RX ORDER — HYDROCODONE BITARTRATE AND ACETAMINOPHEN 5; 325 MG/1; MG/1
1 TABLET ORAL EVERY 4 HOURS PRN
Status: DISCONTINUED | OUTPATIENT
Start: 2019-11-13 | End: 2019-11-15 | Stop reason: HOSPADM

## 2019-11-13 RX ORDER — PROMETHAZINE HYDROCHLORIDE 25 MG/1
25 SUPPOSITORY RECTAL ONCE AS NEEDED
Status: DISCONTINUED | OUTPATIENT
Start: 2019-11-13 | End: 2019-11-13 | Stop reason: HOSPADM

## 2019-11-13 RX ORDER — PROMETHAZINE HYDROCHLORIDE 25 MG/1
25 TABLET ORAL ONCE AS NEEDED
Status: DISCONTINUED | OUTPATIENT
Start: 2019-11-13 | End: 2019-11-13 | Stop reason: HOSPADM

## 2019-11-13 RX ORDER — SODIUM CHLORIDE 0.9 % (FLUSH) 0.9 %
3 SYRINGE (ML) INJECTION EVERY 12 HOURS SCHEDULED
Status: DISCONTINUED | OUTPATIENT
Start: 2019-11-13 | End: 2019-11-15 | Stop reason: HOSPADM

## 2019-11-13 RX ADMIN — ONDANSETRON 4 MG: 2 INJECTION INTRAMUSCULAR; INTRAVENOUS at 13:06

## 2019-11-13 RX ADMIN — HYDROCODONE BITARTRATE AND ACETAMINOPHEN 1 TABLET: 5; 325 TABLET ORAL at 17:24

## 2019-11-13 RX ADMIN — ISOSORBIDE MONONITRATE 30 MG: 30 TABLET ORAL at 18:10

## 2019-11-13 RX ADMIN — LIDOCAINE HYDROCHLORIDE 100 MG: 20 INJECTION, SOLUTION INFILTRATION; PERINEURAL at 13:00

## 2019-11-13 RX ADMIN — LISINOPRIL 10 MG: 10 TABLET ORAL at 18:10

## 2019-11-13 RX ADMIN — FENTANYL CITRATE 50 MCG: 50 INJECTION INTRAMUSCULAR; INTRAVENOUS at 14:46

## 2019-11-13 RX ADMIN — DEXAMETHASONE SODIUM PHOSPHATE 4 MG: 10 INJECTION INTRAMUSCULAR; INTRAVENOUS at 13:06

## 2019-11-13 RX ADMIN — POTASSIUM CHLORIDE AND SODIUM CHLORIDE 100 ML/HR: 900; 150 INJECTION, SOLUTION INTRAVENOUS at 17:25

## 2019-11-13 RX ADMIN — MORPHINE SULFATE 2 MG: 2 INJECTION, SOLUTION INTRAMUSCULAR; INTRAVENOUS at 18:11

## 2019-11-13 RX ADMIN — CEFAZOLIN SODIUM 1 G: 2 INJECTION, SOLUTION INTRAVENOUS at 15:14

## 2019-11-13 RX ADMIN — FAMOTIDINE 20 MG: 10 INJECTION INTRAVENOUS at 12:22

## 2019-11-13 RX ADMIN — TAMSULOSIN HYDROCHLORIDE 0.4 MG: 0.4 CAPSULE ORAL at 21:39

## 2019-11-13 RX ADMIN — ROCURONIUM BROMIDE 50 MG: 10 INJECTION INTRAVENOUS at 13:00

## 2019-11-13 RX ADMIN — MIDAZOLAM 1 MG: 1 INJECTION INTRAMUSCULAR; INTRAVENOUS at 12:22

## 2019-11-13 RX ADMIN — ATORVASTATIN CALCIUM 80 MG: 80 TABLET, FILM COATED ORAL at 21:39

## 2019-11-13 RX ADMIN — PHENYLEPHRINE HYDROCHLORIDE 100 MCG: 10 INJECTION INTRAVENOUS at 14:56

## 2019-11-13 RX ADMIN — FENTANYL CITRATE 50 MCG: 50 INJECTION INTRAMUSCULAR; INTRAVENOUS at 15:00

## 2019-11-13 RX ADMIN — CARVEDILOL 6.25 MG: 6.25 TABLET, FILM COATED ORAL at 18:09

## 2019-11-13 RX ADMIN — PROPOFOL 200 MG: 10 INJECTION, EMULSION INTRAVENOUS at 13:00

## 2019-11-13 RX ADMIN — CEFAZOLIN SODIUM 2 G: 2 INJECTION, SOLUTION INTRAVENOUS at 13:05

## 2019-11-13 RX ADMIN — SUGAMMADEX 400 MG: 100 INJECTION, SOLUTION INTRAVENOUS at 15:27

## 2019-11-13 RX ADMIN — SODIUM CHLORIDE, POTASSIUM CHLORIDE, SODIUM LACTATE AND CALCIUM CHLORIDE 9 ML/HR: 600; 310; 30; 20 INJECTION, SOLUTION INTRAVENOUS at 12:22

## 2019-11-13 RX ADMIN — FENTANYL CITRATE 50 MCG: 50 INJECTION, SOLUTION INTRAMUSCULAR; INTRAVENOUS at 15:58

## 2019-11-13 NOTE — ANESTHESIA PREPROCEDURE EVALUATION
Anesthesia Evaluation     Patient summary reviewed and Nursing notes reviewed                Airway   Mallampati: II  Neck ROM: limited  Dental    (+) lower dentures and upper dentures    Pulmonary    (+) a smoker Former,   Cardiovascular     ECG reviewed  Rhythm: regular  Rate: normal    (+) hypertension, past MI  >12 months, CAD, cardiac stents more than 12 months ago hyperlipidemia,       Neuro/Psych- negative ROS  GI/Hepatic/Renal/Endo    (+)  GERD,      Musculoskeletal     Abdominal    Substance History - negative use     OB/GYN negative ob/gyn ROS         Other   arthritis,                      Anesthesia Plan    ASA 3     general     intravenous induction     Anesthetic plan, all risks, benefits, and alternatives have been provided, discussed and informed consent has been obtained with: patient.

## 2019-11-13 NOTE — BRIEF OP NOTE
LUMBAR DISCECTOMY POSTERIOR WITH FUSION INSTRUMENTATION  Progress Note    Boni Hernandez  11/13/2019    Pre-op Diagnosis:   Spondylolisthesis of lumbar region [M43.16]       Post-Op Diagnosis Codes:     * Spondylolisthesis of lumbar region [M43.16]    Procedure/CPT® Codes:      Procedure(s):  Lumbar 4 to lumbar 5 laminectomy with a posterior lateral fusion and instrumentation    Surgeon(s):  Matthew Alex MD    Anesthesia: General    Staff:   Circulator: Tracy Fwoler RN; Daniel Rai Jr., RN  Scrub Person: Mandy Christiansen; Michel Montiel  Vendor Representative: Kanu Queen  Assistant: Anupama Bazan CSA    Estimated Blood Loss: 100ml    Urine Voided: 450 mL    Specimens:                None          Drains:   Closed/Suction Drain 1 Back Bulb 10 Fr. (Active)       Urethral Catheter Non-latex 16 Fr. (Active)       Findings: Severe stenosis    Complications: None      Matthew Alex MD     Date: 11/13/2019  Time: 3:30 PM

## 2019-11-13 NOTE — ANESTHESIA POSTPROCEDURE EVALUATION
Patient: Boni Hernandez    Procedure Summary     Date:  11/13/19 Room / Location:  [facility] OR [address] [place] [address] MAIN OR    Anesthesia Start:  1254 Anesthesia Stop:  1552    Procedure:  Lumbar 4 to lumbar 5 laminectomy with a posterior lateral fusion and instrumentation (N/A Spine Lumbar) Diagnosis:       Spondylolisthesis of lumbar region      (Spondylolisthesis of lumbar region [M43.16])    Surgeon:  Matthew Alex MD Provider:  Sakina Perez MD    Anesthesia Type:  general ASA Status:  3          Anesthesia Type: general  Last vitals  BP   146/66 (11/13/19 1620)   Temp   36.9 °C (98.4 °F) (11/13/19 1548)   Pulse   72 (11/13/19 1620)   Resp   16 (11/13/19 1620)     SpO2   100 % (11/13/19 1620)     Post Anesthesia Care and Evaluation    Patient location during evaluation: bedside  Patient participation: complete - patient participated  Level of consciousness: awake and alert  Pain management: adequate  Airway patency: patent  Anesthetic complications: No anesthetic complications  PONV Status: controlled  Cardiovascular status: blood pressure returned to baseline and acceptable  Respiratory status: acceptable  Hydration status: acceptable

## 2019-11-13 NOTE — OP NOTE
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    Assistant: Anupama Bazan CFA who was instrumental in providing help with hemostasis, visualization of neural structures and retraction of neural structures.    Indications for procedure: This patient has been having severe pain in the legs and the back.  Imaging shows severe stenosis with a grade 1 spondylolisthesis at L4-5.  There is been no improvement with nonsurgical treatment in the patient elected to proceed with surgery.    Operative summary: After induction of general anesthesia with intravenous agents patient was intubated and placed on the operating table in the prone position.  All pressure points were padded including peripheral points of entrapment.  The back was then prepped with chloraprep.  After a 3 minute drying time the back was draped with Ioban, towels, half sheets and a split sheet.    An incision was then made in the skin in the midline.  The dissection was carried down to the thoracolumbar fascia which was opened in the midline. The muscles were stripped off the L4 laminar arch and spinous process and the L5 laminar arch and spinous process as well as out laterally over the transverse processes and lateral facet.  Once those were exposed they were decorticated with the Shruti drill.  The lateral canal was then lined with mastergraft.  Following this the entire spinous process of L4 was removed along with the respective laminar arch by thinning it down with the Shruti drill and cracking it directly back off the dura.  The superior spinous process and laminar arch of L5 were also removed.  Once the dura was exposed the remainder of the  operation was done under the high power magnification of the operating microscope using microsurgical technique and microsurgical instrumentation.  The Penfield 4 was used to dissect the dura and the nerve root off of the medial pedicle of L4 and L5 bilaterally.  A combination of the Kerrison's and the Shruti drill were used to open the lateral recess out to the level of the medial pedicle.  This was both at the top and the lower decompressed level.  Once this was done the medial aspect of the facet was removed in order to open the superior foramen.  Following this each of the 4 nerve roots were checked to be sure they were decompressed.  They all appeared to be completely decompressed.    Once this was done the locally harvested bone was placed out laterally over the mastergraft, the lateral facet and the transverse processes.   holes were then drilled through the pedicles of L4 and L5 bilaterally.  Following this 6.5 mm by 50 mm screws were placed into the pedicles of L4 bilaterally and 6.5 mm by 50 mm screws were placed bilaterally at L5.  Intraoperative x-ray confirmed good placement of the hardware and they were then connected with a emily and compressed.  Following this the area was copiously irrigated with bacitracin solution and then another dose of Kefzol was given prior to closure.  The paraspinous musculature was injected with 100 cc of 1/8% Marcaine with 1-200,000 epinephrine solution.  A drain was placed in the epidural space and tunneled subcutaneously.  The incision was then closed in layers bedrest and the patient was taken to the recovery room in stable condition.  There were no apparent complications and sponge instrument and needle counts were correct at the end of the procedure.

## 2019-11-13 NOTE — ANESTHESIA PROCEDURE NOTES
Airway  Urgency: elective    Date/Time: 11/13/2019 1:02 PM  Airway not difficult    General Information and Staff    Patient location during procedure: OR  CRNA: Corina Villa CRNA    Indications and Patient Condition  Indications for airway management: airway protection    Preoxygenated: yes      Final Airway Details  Final airway type: endotracheal airway      Successful airway: ETT and reinforced tube  Cuffed: yes   Successful intubation technique: direct laryngoscopy  Endotracheal tube insertion site: oral  Blade: Rooney  Blade size: 2  ETT size (mm): 7.5  Cormack-Lehane Classification: grade I - full view of glottis  Placement verified by: chest auscultation and capnometry   Cuff volume (mL): 10  Measured from: lips  ETT/EBT  to lips (cm): 21  Number of attempts at approach: 1    Additional Comments  Atraumatic placement of ETT, dentition unchanged from preop.

## 2019-11-13 NOTE — PLAN OF CARE
Problem: Patient Care Overview  Goal: Plan of Care Review  Outcome: Ongoing (interventions implemented as appropriate)   11/13/19 1655 11/13/19 4116   Plan of Care Review   Progress --  improving   OTHER   Outcome Summary --  POD 0. Numbness to LLE. Dale and CALI drain in place. Due to ambulate. VSS will monitor   Coping/Psychosocial   Plan of Care Reviewed With patient;family --      Goal: Individualization and Mutuality  Outcome: Ongoing (interventions implemented as appropriate)    Goal: Discharge Needs Assessment  Outcome: Ongoing (interventions implemented as appropriate)    Goal: Interprofessional Rounds/Family Conf  Outcome: Ongoing (interventions implemented as appropriate)      Problem: Pain, Chronic (Adult)  Goal: Identify Related Risk Factors and Signs and Symptoms  Outcome: Ongoing (interventions implemented as appropriate)    Goal: Acceptable Pain/Comfort Level and Functional Ability  Outcome: Ongoing (interventions implemented as appropriate)      Problem: Fall Risk (Adult)  Goal: Identify Related Risk Factors and Signs and Symptoms  Outcome: Ongoing (interventions implemented as appropriate)    Goal: Absence of Fall  Outcome: Ongoing (interventions implemented as appropriate)

## 2019-11-14 ENCOUNTER — APPOINTMENT (OUTPATIENT)
Dept: GENERAL RADIOLOGY | Facility: HOSPITAL | Age: 79
End: 2019-11-14

## 2019-11-14 PROBLEM — N40.0 BPH (BENIGN PROSTATIC HYPERPLASIA): Status: ACTIVE | Noted: 2019-11-14

## 2019-11-14 PROBLEM — K21.9 GERD (GASTROESOPHAGEAL REFLUX DISEASE): Status: ACTIVE | Noted: 2019-11-14

## 2019-11-14 PROBLEM — E78.5 HYPERLIPIDEMIA: Status: ACTIVE | Noted: 2019-11-14

## 2019-11-14 PROBLEM — I25.10 CORONARY ARTERY DISEASE: Status: ACTIVE | Noted: 2019-11-14

## 2019-11-14 PROBLEM — I10 HTN (HYPERTENSION): Status: ACTIVE | Noted: 2019-11-14

## 2019-11-14 LAB
ALBUMIN SERPL-MCNC: 3.7 G/DL (ref 3.5–5.2)
ALBUMIN/GLOB SERPL: 1.6 G/DL
ALP SERPL-CCNC: 58 U/L (ref 39–117)
ALT SERPL W P-5'-P-CCNC: 17 U/L (ref 1–41)
ANION GAP SERPL CALCULATED.3IONS-SCNC: 11.2 MMOL/L (ref 5–15)
AST SERPL-CCNC: 22 U/L (ref 1–40)
BASOPHILS # BLD AUTO: 0.01 10*3/MM3 (ref 0–0.2)
BASOPHILS NFR BLD AUTO: 0.1 % (ref 0–1.5)
BILIRUB SERPL-MCNC: 0.3 MG/DL (ref 0.2–1.2)
BUN BLD-MCNC: 9 MG/DL (ref 8–23)
BUN/CREAT SERPL: 10 (ref 7–25)
CALCIUM SPEC-SCNC: 8.3 MG/DL (ref 8.6–10.5)
CHLORIDE SERPL-SCNC: 99 MMOL/L (ref 98–107)
CO2 SERPL-SCNC: 23.8 MMOL/L (ref 22–29)
CREAT BLD-MCNC: 0.9 MG/DL (ref 0.76–1.27)
DEPRECATED RDW RBC AUTO: 41.1 FL (ref 37–54)
EOSINOPHIL # BLD AUTO: 0 10*3/MM3 (ref 0–0.4)
EOSINOPHIL NFR BLD AUTO: 0 % (ref 0.3–6.2)
ERYTHROCYTE [DISTWIDTH] IN BLOOD BY AUTOMATED COUNT: 12.2 % (ref 12.3–15.4)
GFR SERPL CREATININE-BSD FRML MDRD: 81 ML/MIN/1.73
GLOBULIN UR ELPH-MCNC: 2.3 GM/DL
GLUCOSE BLD-MCNC: 122 MG/DL (ref 65–99)
HCT VFR BLD AUTO: 32.3 % (ref 37.5–51)
HGB BLD-MCNC: 10.8 G/DL (ref 13–17.7)
IMM GRANULOCYTES # BLD AUTO: 0.05 10*3/MM3 (ref 0–0.05)
IMM GRANULOCYTES NFR BLD AUTO: 0.5 % (ref 0–0.5)
LYMPHOCYTES # BLD AUTO: 1.39 10*3/MM3 (ref 0.7–3.1)
LYMPHOCYTES NFR BLD AUTO: 15.1 % (ref 19.6–45.3)
MCH RBC QN AUTO: 30.3 PG (ref 26.6–33)
MCHC RBC AUTO-ENTMCNC: 33.4 G/DL (ref 31.5–35.7)
MCV RBC AUTO: 90.5 FL (ref 79–97)
MONOCYTES # BLD AUTO: 0.77 10*3/MM3 (ref 0.1–0.9)
MONOCYTES NFR BLD AUTO: 8.4 % (ref 5–12)
NEUTROPHILS # BLD AUTO: 7 10*3/MM3 (ref 1.7–7)
NEUTROPHILS NFR BLD AUTO: 75.9 % (ref 42.7–76)
NRBC BLD AUTO-RTO: 0 /100 WBC (ref 0–0.2)
PLATELET # BLD AUTO: 177 10*3/MM3 (ref 140–450)
PMV BLD AUTO: 10 FL (ref 6–12)
POTASSIUM BLD-SCNC: 4.1 MMOL/L (ref 3.5–5.2)
PROT SERPL-MCNC: 6 G/DL (ref 6–8.5)
RBC # BLD AUTO: 3.57 10*6/MM3 (ref 4.14–5.8)
SODIUM BLD-SCNC: 134 MMOL/L (ref 136–145)
WBC NRBC COR # BLD: 9.22 10*3/MM3 (ref 3.4–10.8)

## 2019-11-14 PROCEDURE — 72100 X-RAY EXAM L-S SPINE 2/3 VWS: CPT

## 2019-11-14 PROCEDURE — 85025 COMPLETE CBC W/AUTO DIFF WBC: CPT | Performed by: NEUROLOGICAL SURGERY

## 2019-11-14 PROCEDURE — 99024 POSTOP FOLLOW-UP VISIT: CPT | Performed by: PHYSICIAN ASSISTANT

## 2019-11-14 PROCEDURE — 94799 UNLISTED PULMONARY SVC/PX: CPT

## 2019-11-14 PROCEDURE — 25010000003 CEFAZOLIN IN DEXTROSE 2-4 GM/100ML-% SOLUTION: Performed by: NEUROLOGICAL SURGERY

## 2019-11-14 PROCEDURE — 25810000003 SODIUM CHLORIDE 0.9 % WITH KCL 20 MEQ 20-0.9 MEQ/L-% SOLUTION: Performed by: NEUROLOGICAL SURGERY

## 2019-11-14 PROCEDURE — 80053 COMPREHEN METABOLIC PANEL: CPT | Performed by: NURSE PRACTITIONER

## 2019-11-14 PROCEDURE — 97161 PT EVAL LOW COMPLEX 20 MIN: CPT

## 2019-11-14 RX ORDER — CEFAZOLIN SODIUM 2 G/100ML
2 INJECTION, SOLUTION INTRAVENOUS ONCE
Status: DISCONTINUED | OUTPATIENT
Start: 2019-11-14 | End: 2019-11-14

## 2019-11-14 RX ADMIN — CEFAZOLIN SODIUM 2 G: 2 INJECTION, SOLUTION INTRAVENOUS at 06:38

## 2019-11-14 RX ADMIN — SODIUM CHLORIDE, PRESERVATIVE FREE 3 ML: 5 INJECTION INTRAVENOUS at 08:36

## 2019-11-14 RX ADMIN — POTASSIUM CHLORIDE AND SODIUM CHLORIDE 100 ML/HR: 900; 150 INJECTION, SOLUTION INTRAVENOUS at 16:36

## 2019-11-14 RX ADMIN — HYDROCODONE BITARTRATE AND ACETAMINOPHEN 1 TABLET: 5; 325 TABLET ORAL at 13:00

## 2019-11-14 RX ADMIN — HYDROCODONE BITARTRATE AND ACETAMINOPHEN 1 TABLET: 5; 325 TABLET ORAL at 17:02

## 2019-11-14 RX ADMIN — SODIUM CHLORIDE, PRESERVATIVE FREE 3 ML: 5 INJECTION INTRAVENOUS at 22:44

## 2019-11-14 RX ADMIN — TAMSULOSIN HYDROCHLORIDE 0.4 MG: 0.4 CAPSULE ORAL at 22:42

## 2019-11-14 RX ADMIN — CEFAZOLIN SODIUM 2 G: 2 INJECTION, SOLUTION INTRAVENOUS at 00:08

## 2019-11-14 RX ADMIN — ATORVASTATIN CALCIUM 80 MG: 80 TABLET, FILM COATED ORAL at 22:43

## 2019-11-14 RX ADMIN — HYDROCODONE BITARTRATE AND ACETAMINOPHEN 1 TABLET: 5; 325 TABLET ORAL at 22:42

## 2019-11-14 RX ADMIN — POTASSIUM CHLORIDE AND SODIUM CHLORIDE 100 ML/HR: 900; 150 INJECTION, SOLUTION INTRAVENOUS at 05:56

## 2019-11-14 RX ADMIN — CARVEDILOL 6.25 MG: 6.25 TABLET, FILM COATED ORAL at 17:02

## 2019-11-14 RX ADMIN — LISINOPRIL 10 MG: 10 TABLET ORAL at 08:36

## 2019-11-14 RX ADMIN — ISOSORBIDE MONONITRATE 30 MG: 30 TABLET ORAL at 08:36

## 2019-11-14 RX ADMIN — HYDROCODONE BITARTRATE AND ACETAMINOPHEN 1 TABLET: 5; 325 TABLET ORAL at 08:35

## 2019-11-14 NOTE — THERAPY EVALUATION
Patient Name: Boni Hernandez  : 1940    MRN: [id number]                              Today's Date: 2019       Admit Date: 2019    Visit Dx:     ICD-10-CM ICD-9-CM   1. Generalized weakness R53.1 780.79   2. Spondylolisthesis of lumbar region M43.16 738.4   3. Spinal stenosis, lumbar region, with neurogenic claudication M48.062 724.03     Patient Active Problem List   Diagnosis   • Spinal stenosis, lumbar region, with neurogenic claudication   • DDD (degenerative disc disease), lumbar   • Spondylolisthesis of lumbar region     Past Medical History:   Diagnosis Date   • BPH (benign prostatic hyperplasia)    • BPH (benign prostatic hyperplasia)    • Coronary artery disease    • GERD (gastroesophageal reflux disease)    • History of MI (myocardial infarction)     APPROX. 2 YEARS AGO, HAS STENT, FOLLOWED BY DR LOREDO, 10/21/19 STRESS TEST AT [facility]   • [place] (hard of hearing)    • HTN (hypertension)    • Hyperlipidemia    • Low back pain     RADIATES DOWN BOTH LEGS   • Spinal stenosis      Past Surgical History:   Procedure Laterality Date   • CAROTID ENDARTERECTOMY Left 2017   • CATARACT EXTRACTION WITH INTRAOCULAR LENS IMPLANT Bilateral    • CORONARY ANGIOPLASTY WITH STENT PLACEMENT  2016   • DENTAL PROCEDURE      DENTAL IMPLANTS   • EPIDURAL BLOCK     • LUMBAR DISCECTOMY FUSION INSTRUMENTATION N/A 2019    Procedure: Lumbar 4 to lumbar 5 laminectomy with a posterior lateral fusion and instrumentation;  Surgeon: Matthew Alex MD;  Location: [facility];  Service: Neurosurgery   • OTHER SURGICAL HISTORY  2015    PT STATES HE HAS HAD STENTS PUT IN BOTH LEGS.    • TONSILLECTOMY       General Information     Row Name 19 0905          PT Evaluation Time/Intention    Document Type  evaluation  -     Mode of Treatment  individual therapy;physical therapy  -     Row Name 19 09          General Information    Patient Profile Reviewed?  yes  -LH     Prior Level of Function   independent:  -     Existing Precautions/Restrictions  brace worn when out of bed;spinal  -     Barriers to Rehab  none identified  -[facility] Name 11/14/19 0905          Relationship/Environment    Lives With  spouse  -[facility] Name 11/14/19 0905          Home Main Entrance    Number of Stairs, Main Entrance  three  -[facility] Name 11/14/19 0905          Cognitive Assessment/Intervention- PT/OT    Orientation Status (Cognition)  oriented x 3  -       User Key  (r) = Recorded By, (t) = Taken By, (c) = Cosigned By    Initials Name Provider Type     Sakina Rosario, PT Physical Therapist        Mobility     [facility] Name 11/14/19 0906          Bed Mobility Assessment/Treatment    Bed Mobility Assessment/Treatment  supine-sit  -     [place] Level (Bed Mobility)  moderate assist (50% patient effort);verbal cues max cueing to complete log rolling  -[facility] Name 11/14/19 0906          Sit-Stand Transfer    Sit-Stand [place] (Transfers)  contact guard  -     Assistive Device (Sit-Stand Transfers)  walker, front-wheeled  -[facility] Name 11/14/19 0906          Gait/Stairs Assessment/Training    Gait/Stairs Assessment/Training  gait/ambulation independence  -     [place] Level (Gait)  contact guard  -     Assistive Device (Gait)  walker, front-wheeled  -     Distance in Feet (Gait)  120  -     Pattern (Gait)  step-through  -     Bilateral Gait Deviations  forward flexed posture  -       User Key  (r) = Recorded By, (t) = Taken By, (c) = Cosigned By    Initials Name Provider Type     Sakina Rosario, PT Physical Therapist        Obj/Interventions     Row Name 11/14/19 0907          General ROM    GENERAL ROM COMMENTS  [facility] WFL  -[facility] Name 11/14/19 0907          MMT (Manual Muscle Testing)    General MMT Comments  [facility] Name 11/14/19 0907          Sensory Assessment/Intervention    Sensory General Assessment  no sensation deficits identified pt reports before sx had  LLE n/t, pt reports post op it is resolved  -       User Key  (r) = Recorded By, (t) = Taken By, (c) = Cosigned By    Initials Name Provider Type     Sakina Rosario, PT Physical Therapist        Goals/Plan     [facility] Name 11/14/19 0908          Gait Training Goal 1 (PT)    Activity/Assistive Device (Gait Training Goal 1, PT)  gait (walking locomotion)  -     [place] Level (Gait Training Goal 1, PT)  supervision required  -     Distance (Gait Goal 1, PT)  250  -LH     Time Frame (Gait Training Goal 1, PT)  1 week  -[facility] Name 11/14/19 0908          Stairs Goal 1 (PT)    Activity/Assistive Device (Stairs Goal 1, PT)  stairs, all skills  -     [place] Level/Cues Needed (Stairs Goal 1, PT)  supervision required  -     Number of Stairs (Stairs Goal 1, PT)  3  -LH     Time Frame (Stairs Goal 1, PT)  1 week  -       User Key  (r) = Recorded By, (t) = Taken By, (c) = Cosigned By    Initials Name Provider Type     Sakina Rosario, PT Physical Therapist        Clinical Impression     [facility] Name 11/14/19 0908          Pain Assessment    Additional Documentation  Pain Scale: FACES Pre/Post-Treatment (Group)  -LH     Row Name 11/14/19 0908          Pain Scale: Numbers Pre/Post-Treatment    Pain Location - Orientation  incisional  -     Pain Location  back  -     Pain Intervention(s)  Medication (See MAR)  -LH     Row Name 11/14/19 0908          Pain Scale: FACES Pre/Post-Treatment    Pain: FACES Scale, Pretreatment  2-->hurts little bit  -     Pain: FACES Scale, Post-Treatment  2-->hurts little bit  -[facility] Name 11/14/19 0908          Plan of Care Review    Plan of Care Reviewed With  patient  -LH     Row Name 11/14/19 0908          Physical Therapy Clinical Impression    Criteria for Skilled Interventions Met (PT Clinical Impression)  yes  -     Rehab Potential (PT Clinical Summary)  good, to achieve stated therapy goals  -LH     Row Name 11/14/19 0908          Positioning and Restraints     Pre-Treatment Position  in bed  -     Post Treatment Position  chair  -     In Chair  sitting;exit alarm on;call light within reach;encouraged to call for assist  -       User Key  (r) = Recorded By, (t) = Taken By, (c) = Cosigned By    Initials Name Provider Type     Sakina Rosario, PT Physical Therapist        Outcome Measures     Row Name 11/14/19 0909          How much help from another person do you currently need...    Turning from your back to your side while in flat bed without using bedrails?  3  -LH     Moving from lying on back to sitting on the side of a flat bed without bedrails?  3  -LH     Moving to and from a bed to a chair (including a wheelchair)?  3  -LH     Standing up from a chair using your arms (e.g., wheelchair, bedside chair)?  3  -LH     Climbing 3-5 steps with a railing?  3  -LH     To walk in hospital room?  3  -     AM-PAC 6 Clicks Score (PT)  18  -     Row Name 11/14/19 0909          Functional Assessment    Outcome Measure Options  AM-PAC 6 Clicks Basic Mobility (PT)  -       User Key  (r) = Recorded By, (t) = Taken By, (c) = Cosigned By    Initials Name Provider Type     Sakina Rosario, PT Physical Therapist        Physical Therapy Education     Title: PT OT SLP Therapies (In Progress)     Topic: Physical Therapy (In Progress)     Point: Mobility training (Done)     Learning Progress Summary           Patient Acceptance, E, DU,NR by  at 11/14/2019  9:10 AM                   Point: Home exercise program (Done)     Learning Progress Summary           Patient Acceptance, E, DU,NR by  at 11/14/2019  9:10 AM                               User Key     Initials Effective Dates Name Provider Type [facility] access hospital 04/03/18 -  Sakina Rosario, PT Physical Therapist PT              PT Recommendation and Plan     Outcome Summary/Treatment Plan (PT)  Anticipated Discharge Disposition (PT): home with assist, home with home health  Plan of Care Reviewed With: patient  Outcome  Summary: pt presents w generalized weakness post op L4-5 lami w PLF, pt reports LLE n/t resolved post op. this date good tolerance to household ambulation CGA rwx, pain controlled. pt may benefit from skilled PT acutely for inc independence w functional mobility, gait training, for safe reintergration to home.      Time Calculation:   PT Charges     Row Name 11/14/19 0911             Time Calculation    Start Time  0832  -      Stop Time  0844  -      Time Calculation (min)  12 min  -      PT Received On  11/14/19  -      PT - Next Appointment  11/15/19  -      PT Goal Re-Cert Due Date  11/21/19  -        User Key  (r) = Recorded By, (t) = Taken By, (c) = Cosigned By    Initials Name Provider Type     Sakina Rosario, PT Physical Therapist        Therapy Charges for Today     Code Description Service Date Service Provider Modifiers Qty    17744024492 HC PT EVAL LOW COMPLEXITY 2 11/14/2019 Sakina Rosario, PT GP 1          PT G-Codes  Outcome Measure Options: AM-PAC 6 Clicks Basic Mobility (PT)  AM-PAC 6 Clicks Score (PT): 18    Sakina Rosario, PT  11/14/2019

## 2019-11-14 NOTE — PLAN OF CARE
Problem: Patient Care Overview  Goal: Plan of Care Review   11/14/19 0513   Plan of Care Review   Progress improving   OTHER   Outcome Summary pt rested through the night, less numbness to lower extremities, ambulated with staff without difficulty and tolerated well, arnulfo and f/c remains in place, will continue to moniter   Coping/Psychosocial   Plan of Care Reviewed With patient

## 2019-11-14 NOTE — PROGRESS NOTES
"Doing well. Legs much better. Has back pain as expected. Did walk some with PT.  Has banda and would like to keep it another night.    Blood pressure 112/54, pulse 81, temperature 98.7 °F (37.1 °C), temperature source Oral, resp. rate 16, height 170.2 cm (67\"), weight 72.7 kg (160 lb 6 oz), SpO2 94 %.      AA, Ox3  Incision ok  Wearing brace  CALI: 330cc/24 hrs, about 60cc this am      ..  Results from last 7 days   Lab Units 11/14/19  0508   WBC 10*3/mm3 9.22   HEMOGLOBIN g/dL 10.8*   HEMATOCRIT % 32.3*   PLATELETS 10*3/mm3 177         POD#1 s/p L4-5 lami/PLIF  HTN, Hx of MI  BPH    Keep banda, mobilize  Plan to d/c banda in am  Keep drain  CBC in am  Home tomorrow or Saturday depending on CALI volume, voiding status and pain level.     "

## 2019-11-14 NOTE — PLAN OF CARE
Problem: Patient Care Overview  Goal: Plan of Care Review   11/14/19 0910   OTHER   Outcome Summary pt presents w generalized weakness post op L4-5 lami w PLF, pt reports LLE n/t resolved post op. this date good tolerance to household ambulation CGA rwx, pain controlled. pt may benefit from skilled PT acutely for inc independence w functional mobility, gait training, for safe reintergration to home.    Coping/Psychosocial   Plan of Care Reviewed With patient

## 2019-11-14 NOTE — CONSULTS
Patient Name:  Boni Hernandez  YOB: 1940  MRN:  [id number]  Date of Admission:  11/13/2019  Date of Consult:  11/14/2019  Patient Care Team:  Eric Freed MD as PCP - General (Family Medicine)  Mark Abraham MD as Consulting Physician (Cardiology)    Inpatient Internal Medicine Consult  Consult performed by: Rocío Christopher APRN  Consult ordered by: Matthew Alex MD  Reason for consult: Evaluate status and make recommendations regarding treatment for medical management.        Subjective   History of Present Illness  Mr. Hernandez is a 79 y.o. male that has been admitted to [facility] following elective Lumbar 4 to lumbar 5 laminectomy with a posterior lateral fusion and instrumentation.  He has been admitted to an orthopedic floor following surgery and we were asked to see and assist with his medical problems, specifically relating to his HTN, HLD, CAD and BPH.  At the time of my visit he denies any chest pain, SOA, nausea, vomiting or diarrhea.  He has tolerated a diet.  He does complain of expected postoperative discomfort.  He reports being in a normal state of health leading up to surgery. The patient reports a known history of CAD. He states had stents placed a couple years ago and normally sees Dr. Mark Abraham for his Cardiologist. He denies any recent chest pain or dyspnea. It appears he did have a stress test 10/21/19 that was negative for ischemia and he had a calculated EF around 77%. The patient states he additionally takes his blood pressures medications as prescribed. He states he normally takes a couple in the morning and one in the evening, but is unsure which ones at what times. He denies any dizziness or lightheadedness and has been up with physical therapy this morning. He is having back pain related to his surgery, but states his previous leg pain is now gone. He reports a history of BPH, but denies any known retention issues. He is on Flomax for this reason.  His banda catheter is still in place at this time.    Past Medical History:   Diagnosis Date   • BPH (benign prostatic hyperplasia)    • BPH (benign prostatic hyperplasia)    • Coronary artery disease    • GERD (gastroesophageal reflux disease)    • History of MI (myocardial infarction)     APPROX. 2 YEARS AGO, HAS STENT, FOLLOWED BY DR LOREDO, 10/21/19 STRESS TEST AT [facility]   • [place]hard [place] hearing[place]    • HTN (hypertension)    • Hyperlipidemia    • Low back pain     RADIATES DOWN BOTH LEGS   • Spinal stenosis      Past Surgical History:   Procedure Laterality Date   • CAROTID ENDARTERECTOMY Left 2017   • CATARACT EXTRACTION WITH INTRAOCULAR LENS IMPLANT Bilateral    • CORONARY ANGIOPLASTY WITH STENT PLACEMENT  2016   • DENTAL PROCEDURE      DENTAL IMPLANTS   • EPIDURAL BLOCK     • LUMBAR DISCECTOMY FUSION INSTRUMENTATION N/A 2019    Procedure: Lumbar 4 to lumbar 5 laminectomy with a posterior lateral fusion and instrumentation;  Surgeon: Matthew Alex MD;  Location: [facility];  Service: Neurosurgery   • OTHER SURGICAL HISTORY      PT STATES HE HAS HAD STENTS PUT IN BOTH LEGS.    • TONSILLECTOMY       Family History   Problem Relation Age of Onset   • Heart attack Son    • Malig Hyperthermia Neg Hx      Social History     Tobacco Use   • Smoking status: Former Smoker     Packs/day: 2.00     Years: 30.00     Pack years: 60.00     Last attempt to quit:      Years since quittin.8   • Smokeless tobacco: Never Used   Substance Use Topics   • Alcohol use: No     Frequency: Never   • Drug use: No     Medications Prior to Admission   Medication Sig Dispense Refill Last Dose   • acetaminophen (TYLENOL) 500 MG tablet Take 1,000 mg by mouth Every 6 (Six) Hours As Needed for Mild Pain .   2019 at 1800   • atorvastatin (LIPITOR) 80 MG tablet Take 80 mg by mouth Every Night.   2019 at 1800   • carvedilol (COREG) 6.25 MG tablet Take 6.25 mg by mouth Every Evening.   2019 at 1800   •  isosorbide mononitrate (IMDUR) 30 MG 24 hr tablet Take 30 mg by mouth Daily.   11/13/2019 at 0800   • lisinopril (PRINIVIL,ZESTRIL) 10 MG tablet Take 10 mg by mouth Daily.   11/13/2019 at 0800   • Multiple Vitamins-Minerals (CENTRUM SILVER PO) Take 1 tablet by mouth Daily.   11/12/2019 at 0800   • pantoprazole (PROTONIX) 40 MG EC tablet Take 40 mg by mouth Daily As Needed.   11/13/2019 at 0800   • tamsulosin (FLOMAX) 0.4 MG capsule 24 hr capsule Take 1 capsule by mouth Every Night.   11/13/2019 at 1800   • clopidogrel (PLAVIX) 75 MG tablet Take 75 mg by mouth Every Evening. PT TO HOLD 5 DAYS PRIOR TO SURGERY   11/6/2019   • nitroglycerin (NITROSTAT) 0.4 MG SL tablet Place 0.4 mg under the tongue Every 5 (Five) Minutes As Needed for Chest Pain. Take no more than 3 doses in 15 minutes.        Allergies:  Patient has no known allergies.    Review of Systems   Constitutional: Negative for activity change, appetite change, chills, fatigue and fever.   HENT: Negative for congestion, ear pain and postnasal drip.    Eyes: Negative for pain and discharge.   Respiratory: Negative for cough, choking, chest tightness and shortness of breath.    Cardiovascular: Negative for chest pain and leg swelling.   Gastrointestinal: Negative for abdominal distention, abdominal pain, constipation, diarrhea and vomiting.   Endocrine: Negative for cold intolerance and heat intolerance.   Genitourinary: Negative for difficulty urinating and dysuria.   Musculoskeletal: Positive for back pain and gait problem.   Skin: Negative for color change and pallor.   Neurological: Negative for dizziness, weakness, light-headedness and headaches.   Psychiatric/Behavioral: Negative for confusion. The patient is not nervous/anxious.       Objective      Vital Signs  Temp:  [98.3 °F (36.8 °C)-98.7 °F (37.1 °C)] 98.7 °F (37.1 °C)  Heart Rate:  [55-81] 81  Resp:  [16-18] 16  BP: (104-165)/(50-90) 112/54  Body mass index is 25.12 kg/m².    Physical Exam    Constitutional: He is oriented to person, place, and time. He appears well-developed and well-nourished.   HENT:   Head: Normocephalic and atraumatic.   Eyes: Conjunctivae are normal. Right eye exhibits no discharge. Left eye exhibits no discharge.   Neck: Normal range of motion. Neck supple.   Cardiovascular: Normal rate, regular rhythm, normal heart sounds and intact distal pulses.   Pulmonary/Chest: Effort normal and breath sounds normal. No respiratory distress.   Abdominal: Soft. Bowel sounds are normal. He exhibits no distension. There is no tenderness.   Genitourinary:   Genitourinary Comments: Dale catheter still in place.   Musculoskeletal: Normal range of motion. He exhibits no edema or tenderness.   CALI drain noted with small sero-sanguineous drainage. Brace in place for back.   Neurological: He is alert and oriented to person, place, and time. He exhibits normal muscle tone. Coordination normal.   Skin: Skin is warm and dry. No erythema.   Psychiatric: He has a normal mood and affect. His behavior is normal.   Nursing note and vitals reviewed.     Results Review:   I reviewed the patient's new clinical results.  I reviewed the patient's new imaging results and agree with the interpretation.  I reviewed the patient's other test results and agree with the interpretation  I personally viewed and interpreted the patient's EKG/Telemetry data       Assessment/Plan     Active Hospital Problems    Diagnosis POA   • **Spinal stenosis, lumbar region, with neurogenic claudication [M48.062] Unknown   • Hyperlipidemia [E78.5] Yes   • HTN (hypertension) [I10] Yes   • Coronary artery disease [I25.10] Yes   • BPH (benign prostatic hyperplasia) [N40.0] Yes   • GERD (gastroesophageal reflux disease) [K21.9] Yes   • Spondylolisthesis of lumbar region [M43.16] Unknown       Mr. Hernandez is a 79 y.o. male who is s/p Lumbar 4 to lumbar 5 laminectomy with a posterior lateral fusion and instrumentation.    · He seems to be doing  well thus far postoperatively.   · Current blood pressures stable. Will recheck CMP now as no renal function in over a week. Plan to restart lisinopril tomorrow with holding parameters.  · Anticipate fluctuations in BP due to blood loss, hypovolemia, anesthesia, narcotics etc.   · Holding parameters have been written for Coreg and Imdur.  · Continue IVFs as ordered. Checking CMP to assess electrolytes.  · Monitor renal function.  · Continue Flomax for BPH. Remove F/C as soon as possible.  · Continue statin, but checking CMP to assess LFTs.  · SCDs have been ordered for DVT prophylaxis per surgery.  · He was encouraged to use incentive spirometer as instructed.    Thank you very much for asking [facility] to be involved in this patient's care. We will follow along with you.      RUPERT Bangura  [facility]  11/14/19  10:41 AM

## 2019-11-14 NOTE — PROGRESS NOTES
Discharge Planning Assessment  [facility]     Patient Name: Boni Hernandez  MRN: [id number]  Today's Date: 11/14/2019    Admit Date: 11/13/2019    Discharge Needs Assessment     Row Name 11/14/19 1432       Living Environment    Lives With  spouse    Name(s) of Who Lives With Patient  Spouse: Shikha Lutz     Current Living Arrangements  home/apartment/condo    Provides Primary Care For  no one    Family Caregiver if Needed  child(ayde), adult;spouse    Family Caregiver Names  Spouse: Mattobimagnolia and daughter: Malorie    Quality of Family Relationships  helpful;involved;supportive    Able to Return to Prior Arrangements  yes       Transition Planning    Patient/Family Anticipates Transition to  home with family    Patient/Family Anticipated Services at Transition  none    Transportation Anticipated  family or friend will provide       Discharge Needs Assessment    Concerns to be Addressed  no discharge needs identified;denies needs/concerns at this time    Equipment Currently Used at Home  cane, quad;cane, straight;grab bar;walker, rolling    Offered/Gave Vendor List  yes Denies need        Discharge Plan     Row Name 11/14/19 3584       Plan    Plan  Home    Patient/Family in Agreement with Plan  yes    Plan Comments  IMM noted. Introduced self and role of CCP. Spouse: Shikha Hernandez [phone](h) and daughter Malorie Rooney [phone](c) at bedside. Patient agreeable to discuss with all present. Facesheet verified. Patient lives with spouse in a one story house. There is 3 steps to enter with handrail on both sides. Prior to admisson, patient was independent with ADL's, and drove. Uses a standard tub/shower with grab bars present. Has available a straight cane, quad cane and a FW walker.  Patient has never used a HH or rehab facility in past. DC plan is home with assist from spouse. Denies any needs/equipment.                Demographic Summary     Row Name 11/14/19 1432       General Information    Admission Type   inpatient    Arrived From  home    Required Notices Provided  Important Message from Medicare    Preferred Language  English     Used During This Interaction  no        Functional Status     Row Name 11/14/19 1431       Functional Status    Usual Activity Tolerance  good    Current Activity Tolerance  good       Functional Status, IADL    Medications  independent    Meal Preparation  independent    Housekeeping  independent    Laundry  independent    Shopping  independent       Mental Status    General Appearance WDL  WDL        Psychosocial     Row Name 11/14/19 1431       Values/Beliefs    Spiritual, Cultural Beliefs, Rastafari Practices, Values that Affect Care  no       Behavior WDL    Behavior WDL  WDL       Emotion Mood WDL    Emotion/Mood/Affect WDL  WDL       Speech WDL    Speech WDL  WDL       Intellectual Performance WDL    Level of Consciousness  Alert       Coping/Stress    Sources of Support  adult child(ayde);spouse    Reaction to Health Status  accepting;adjusting    Understanding of Condition and Treatment  adequate understanding of medical condition;adequate understanding of treatment       Developmental Stage (Eriksson's)    Developmental Stage  Stage 8 (65 years-death/Late Adulthood) Integrity vs. Despair        Abuse/Neglect     Row Name 11/14/19 1432       Personal Safety    Feels Unsafe at Home or Work/School  no    Feels Threatened by Someone  no    Does Anyone Try to Keep You From Having Contact with Others or Doing Things Outside Your Home?  no    Physical Signs of Abuse Present  no            Halley Ordaz, RN

## 2019-11-14 NOTE — PLAN OF CARE
Problem: Patient Care Overview  Goal: Plan of Care Review  Outcome: Ongoing (interventions implemented as appropriate)   11/14/19 1830   Plan of Care Review   Progress improving   OTHER   Outcome Summary Pt A&Ox4, VSS, c/o pain but well controlled with meds. CALI output 250 on dayshift. F/C to remain until tomorrow morning. Will continue to monitor.   Coping/Psychosocial   Plan of Care Reviewed With patient

## 2019-11-15 VITALS
WEIGHT: 160.38 LBS | TEMPERATURE: 99 F | RESPIRATION RATE: 18 BRPM | SYSTOLIC BLOOD PRESSURE: 127 MMHG | BODY MASS INDEX: 25.17 KG/M2 | OXYGEN SATURATION: 94 % | DIASTOLIC BLOOD PRESSURE: 70 MMHG | HEART RATE: 93 BPM | HEIGHT: 67 IN

## 2019-11-15 PROBLEM — K59.00 CONSTIPATION: Status: ACTIVE | Noted: 2019-11-15

## 2019-11-15 LAB
ANION GAP SERPL CALCULATED.3IONS-SCNC: 8.7 MMOL/L (ref 5–15)
BASOPHILS # BLD AUTO: 0.03 10*3/MM3 (ref 0–0.2)
BASOPHILS NFR BLD AUTO: 0.3 % (ref 0–1.5)
BUN BLD-MCNC: 10 MG/DL (ref 8–23)
BUN/CREAT SERPL: 13.3 (ref 7–25)
CALCIUM SPEC-SCNC: 8 MG/DL (ref 8.6–10.5)
CHLORIDE SERPL-SCNC: 104 MMOL/L (ref 98–107)
CO2 SERPL-SCNC: 25.3 MMOL/L (ref 22–29)
CREAT BLD-MCNC: 0.75 MG/DL (ref 0.76–1.27)
DEPRECATED RDW RBC AUTO: 40 FL (ref 37–54)
EOSINOPHIL # BLD AUTO: 0.08 10*3/MM3 (ref 0–0.4)
EOSINOPHIL NFR BLD AUTO: 0.9 % (ref 0.3–6.2)
ERYTHROCYTE [DISTWIDTH] IN BLOOD BY AUTOMATED COUNT: 11.9 % (ref 12.3–15.4)
GFR SERPL CREATININE-BSD FRML MDRD: 100 ML/MIN/1.73
GLUCOSE BLD-MCNC: 112 MG/DL (ref 65–99)
HCT VFR BLD AUTO: 32.9 % (ref 37.5–51)
HGB BLD-MCNC: 10.9 G/DL (ref 13–17.7)
IMM GRANULOCYTES # BLD AUTO: 0.07 10*3/MM3 (ref 0–0.05)
IMM GRANULOCYTES NFR BLD AUTO: 0.8 % (ref 0–0.5)
LYMPHOCYTES # BLD AUTO: 1.57 10*3/MM3 (ref 0.7–3.1)
LYMPHOCYTES NFR BLD AUTO: 17.2 % (ref 19.6–45.3)
MCH RBC QN AUTO: 30.3 PG (ref 26.6–33)
MCHC RBC AUTO-ENTMCNC: 33.1 G/DL (ref 31.5–35.7)
MCV RBC AUTO: 91.4 FL (ref 79–97)
MONOCYTES # BLD AUTO: 1.12 10*3/MM3 (ref 0.1–0.9)
MONOCYTES NFR BLD AUTO: 12.2 % (ref 5–12)
NEUTROPHILS # BLD AUTO: 6.28 10*3/MM3 (ref 1.7–7)
NEUTROPHILS NFR BLD AUTO: 68.6 % (ref 42.7–76)
NRBC BLD AUTO-RTO: 0 /100 WBC (ref 0–0.2)
PLATELET # BLD AUTO: 149 10*3/MM3 (ref 140–450)
PMV BLD AUTO: 9.8 FL (ref 6–12)
POTASSIUM BLD-SCNC: 4.4 MMOL/L (ref 3.5–5.2)
RBC # BLD AUTO: 3.6 10*6/MM3 (ref 4.14–5.8)
SODIUM BLD-SCNC: 138 MMOL/L (ref 136–145)
WBC NRBC COR # BLD: 9.15 10*3/MM3 (ref 3.4–10.8)

## 2019-11-15 PROCEDURE — 80048 BASIC METABOLIC PNL TOTAL CA: CPT | Performed by: NURSE PRACTITIONER

## 2019-11-15 PROCEDURE — 99024 POSTOP FOLLOW-UP VISIT: CPT | Performed by: NURSE PRACTITIONER

## 2019-11-15 PROCEDURE — 85025 COMPLETE CBC W/AUTO DIFF WBC: CPT | Performed by: PHYSICIAN ASSISTANT

## 2019-11-15 PROCEDURE — 97110 THERAPEUTIC EXERCISES: CPT

## 2019-11-15 PROCEDURE — 25810000003 SODIUM CHLORIDE 0.9 % WITH KCL 20 MEQ 20-0.9 MEQ/L-% SOLUTION: Performed by: NEUROLOGICAL SURGERY

## 2019-11-15 RX ORDER — BISACODYL 10 MG
10 SUPPOSITORY, RECTAL RECTAL DAILY PRN
Status: DISCONTINUED | OUTPATIENT
Start: 2019-11-15 | End: 2019-11-15 | Stop reason: HOSPADM

## 2019-11-15 RX ORDER — CEPHALEXIN 500 MG/1
500 CAPSULE ORAL 4 TIMES DAILY
Qty: 20 CAPSULE | Refills: 0 | Status: SHIPPED | OUTPATIENT
Start: 2019-11-15 | End: 2019-11-20

## 2019-11-15 RX ORDER — HYDROCODONE BITARTRATE AND ACETAMINOPHEN 5; 325 MG/1; MG/1
2 TABLET ORAL EVERY 6 HOURS PRN
Qty: 30 TABLET | Refills: 0 | Status: SHIPPED | OUTPATIENT
Start: 2019-11-15 | End: 2019-11-20

## 2019-11-15 RX ORDER — TIZANIDINE 2 MG/1
4 TABLET ORAL EVERY 8 HOURS PRN
Qty: 30 TABLET | Refills: 0 | Status: SHIPPED | OUTPATIENT
Start: 2019-11-15 | End: 2022-11-10

## 2019-11-15 RX ORDER — POLYETHYLENE GLYCOL 3350 17 G/17G
17 POWDER, FOR SOLUTION ORAL DAILY
Status: DISCONTINUED | OUTPATIENT
Start: 2019-11-15 | End: 2019-11-15 | Stop reason: HOSPADM

## 2019-11-15 RX ORDER — SENNA AND DOCUSATE SODIUM 50; 8.6 MG/1; MG/1
1 TABLET, FILM COATED ORAL 2 TIMES DAILY
Status: DISCONTINUED | OUTPATIENT
Start: 2019-11-15 | End: 2019-11-15 | Stop reason: HOSPADM

## 2019-11-15 RX ADMIN — POLYETHYLENE GLYCOL 3350 17 G: 17 POWDER, FOR SOLUTION ORAL at 11:58

## 2019-11-15 RX ADMIN — HYDROCODONE BITARTRATE AND ACETAMINOPHEN 1 TABLET: 5; 325 TABLET ORAL at 13:07

## 2019-11-15 RX ADMIN — CARVEDILOL 6.25 MG: 6.25 TABLET, FILM COATED ORAL at 17:21

## 2019-11-15 RX ADMIN — SENNOSIDES AND DOCUSATE SODIUM 1 TABLET: 8.6; 5 TABLET ORAL at 11:58

## 2019-11-15 RX ADMIN — ISOSORBIDE MONONITRATE 30 MG: 30 TABLET ORAL at 08:43

## 2019-11-15 RX ADMIN — POTASSIUM CHLORIDE AND SODIUM CHLORIDE 100 ML/HR: 900; 150 INJECTION, SOLUTION INTRAVENOUS at 04:27

## 2019-11-15 RX ADMIN — HYDROCODONE BITARTRATE AND ACETAMINOPHEN 1 TABLET: 5; 325 TABLET ORAL at 08:43

## 2019-11-15 RX ADMIN — HYDROCODONE BITARTRATE AND ACETAMINOPHEN 1 TABLET: 5; 325 TABLET ORAL at 04:27

## 2019-11-15 RX ADMIN — HYDROCODONE BITARTRATE AND ACETAMINOPHEN 1 TABLET: 5; 325 TABLET ORAL at 17:22

## 2019-11-15 RX ADMIN — LISINOPRIL 10 MG: 10 TABLET ORAL at 08:43

## 2019-11-15 NOTE — NURSING NOTE
Patient discharged home with family. Stable at SD. States understanding of all AVS materials. Scripts sent with patient.

## 2019-11-15 NOTE — PLAN OF CARE
Problem: Patient Care Overview  Goal: Plan of Care Review  Outcome: Ongoing (interventions implemented as appropriate)   11/15/19 2978   Plan of Care Review   Progress improving   OTHER   Outcome Summary AOx4, VSS, pain well controlled with prn meds. CALI had 140 cc of bloody drainage this shift. F/C being removed at 0700. Ambulated assist x1 with walker. Will cont to monitor. Plan is to DC today or Saturday. Depending on CALI drainage and pain control.     Coping/Psychosocial   Plan of Care Reviewed With patient       Problem: Pain, Chronic (Adult)  Goal: Acceptable Pain/Comfort Level and Functional Ability  Outcome: Ongoing (interventions implemented as appropriate)      Problem: Fall Risk (Adult)  Goal: Absence of Fall  Outcome: Ongoing (interventions implemented as appropriate)

## 2019-11-15 NOTE — THERAPY TREATMENT NOTE
Acute Care - Physical Therapy Treatment Note  [facility]     Patient Name: Boni Hernandez  : 1940  MRN: [id number]  Today's Date: 11/15/2019             Admit Date: 2019    Visit Dx:    ICD-10-CM ICD-9-CM   1. Generalized weakness R53.1 780.79   2. Spondylolisthesis of lumbar region M43.16 738.4   3. Spinal stenosis, lumbar region, with neurogenic claudication M48.062 724.03     Patient Active Problem List   Diagnosis   • Spinal stenosis, lumbar region, with neurogenic claudication   • DDD (degenerative disc disease), lumbar   • Spondylolisthesis of lumbar region   • Hyperlipidemia   • HTN (hypertension)   • Coronary artery disease   • BPH (benign prostatic hyperplasia)   • GERD (gastroesophageal reflux disease)       Therapy Treatment    Rehabilitation Treatment Summary     Row Name 11/15/19 0925             Treatment Time/Intention    Discipline  physical therapist  -      Document Type  therapy note (daily note)  -      Subjective Information  no complaints  -      Mode of Treatment  individual therapy;physical therapy  -      Patient/Family Observations  pt supine in bed no acute distress  -      Existing Precautions/Restrictions  brace worn when out of bed;spinal  -LH      Recorded by [] Sakina Rosario, PT 11/15/19 0927      Row Name 11/15/19 0925             Cognitive Assessment/Intervention- PT/OT    Orientation Status (Cognition)  oriented x 3  -LH      Recorded by [] Sakina Rosario, PT 11/15/19 0927      Row Name 11/15/19 0925             Bed Mobility Assessment/Treatment    Bed Mobility Assessment/Treatment  supine-sit  -      Supine-Sit Chaves (Bed Mobility)  minimum assist (75% patient effort);verbal cues  -      Comment (Bed Mobility)  Vcs for log rolling  -LH      Recorded by [] Sakina Rosario, PT 11/15/19 0927      Row Name 11/15/19 0925             Sit-Stand Transfer    Sit-Stand Chaves (Transfers)  contact guard  -      Assistive Device (Sit-Stand  Transfers)  walker, front-wheeled  -LH      Recorded by [] Sakina Rosario, PT 11/15/19 0927      Row Name 11/15/19 0925             Gait/Stairs Assessment/Training    [place] Level (Gait)  contact guard  -LH      Assistive Device (Gait)  walker, front-wheeled  -LH      Distance in Feet (Gait)  130  -LH      Pattern (Gait)  step-through  -LH      Deviations/Abnormal Patterns (Gait)  antalgic  -LH      Bilateral Gait Deviations  forward flexed posture  -LH      Recorded by [] Sakina Rosario, PT 11/15/19 0927      Row Name 11/15/19 0925             Positioning and Restraints    Pre-Treatment Position  in bed  -LH      Post Treatment Position  chair  -LH      In Chair  call light within reach;encouraged to call for assist;exit alarm on;sitting;with nsg  -LH      Recorded by [] Sakina Rosario, PT 11/15/19 0927      Row Name 11/15/19 0925             Pain Scale: Numbers Pre/Post-Treatment    Pain Location - Orientation  incisional  -LH      Pain Location  back  -LH      Pain Intervention(s)  Medication (See MAR)  -LH      Recorded by [] Sakina Rosario, PT 11/15/19 0927      Row Name 11/15/19 0925             Pain Scale: FACES Pre/Post-Treatment    Pain: FACES Scale, Pretreatment  6-->hurts even more  -LH      Pain: FACES Scale, Post-Treatment  6-->hurts even more  -LH      Recorded by [] Sakina Rosario, PT 11/15/19 0927      Row Name                Wound 11/13/19 1531 Other (See comments) back Incision    Wound - Properties Group Date first assessed: 11/13/19 [OD] Time first assessed: 1531 [OD] Side: Other (See comments) [OD] Location: back [OD] Primary Wound Type: Incision [OD] Recorded by:  [OD] Daniel Rai Jr., RN 11/13/19 1531      User Key  (r) = Recorded By, (t) = Taken By, (c) = Cosigned By    Initials Name Effective Dates Discipline     Sakina Rosario, PT 04/03/18 -  PT    OD Daniel aRi Jr., RN 06/16/16 -  Nurse          Wound 11/13/19 1531 Other (See comments) back Incision (Active)   Dressing  Appearance dry;intact 11/14/2019 10:42 PM   Closure BRENT 11/14/2019 10:42 PM   Base dressing in place, unable to visualize 11/14/2019 10:42 PM   Drainage Amount none 11/14/2019 10:42 PM           Physical Therapy Education     Title: PT OT SLP Therapies (In Progress)     Topic: Physical Therapy (In Progress)     Point: Mobility training (Done)     Learning Progress Summary           Patient Acceptance, E, VU,NR by  at 11/15/2019  9:27 AM    Acceptance, E, DU,NR by  at 11/14/2019  9:10 AM                   Point: Home exercise program (Done)     Learning Progress Summary           Patient Acceptance, E, DU,NR by  at 11/14/2019  9:10 AM                               User Key     Initials Effective Dates Name Provider Type Discipline     04/03/18 -  Sakina Rosario, PT Physical Therapist PT                PT Recommendation and Plan  Anticipated Discharge Disposition (PT): home with assist, home with home health  Therapy Frequency (PT Clinical Impression): daily  Outcome Summary/Treatment Plan (PT)  Anticipated Discharge Disposition (PT): home with assist, home with home health  Plan of Care Reviewed With: patient  Progress: improving  Outcome Summary: pain rated 7-8/10 this am- good tolerance to ambulation on unit CGA 130ft rwx. rec rwx for home use. encouraged pt to also ambulate w nsg staff in addition to PT. will cont to prepare for DC.   Outcome Measures     Row Name 11/15/19 0900             How much help from another person do you currently need...    Turning from your back to your side while in flat bed without using bedrails?  3  -LH      Moving from lying on back to sitting on the side of a flat bed without bedrails?  3  -LH      Moving to and from a bed to a chair (including a wheelchair)?  3  -LH      Standing up from a chair using your arms (e.g., wheelchair, bedside chair)?  3  -LH      Climbing 3-5 steps with a railing?  3  -LH      To walk in hospital room?  3  -LH      AM-PAC 6 Clicks Score (PT)   18  -         Functional Assessment    Outcome Measure Options  AM-PAC 6 Clicks Basic Mobility (PT)  -        User Key  (r) = Recorded By, (t) = Taken By, (c) = Cosigned By    Initials Name Provider Type     Sakina Rosario, PT Physical Therapist         Time Calculation:   PT Charges     Row Name 11/15/19 0928             Time Calculation    Start Time  0913  -      Stop Time  0924  -      Time Calculation (min)  11 min  -      PT Received On  11/15/19  -      PT - Next Appointment  11/16/19  -        User Key  (r) = Recorded By, (t) = Taken By, (c) = Cosigned By    Initials Name Provider Type     Sakina Rosario, PT Physical Therapist        Therapy Charges for Today     Code Description Service Date Service Provider Modifiers Qty    27890377414 HC PT EVAL LOW COMPLEXITY 2 11/14/2019 Sakina Rosario, PT GP 1    13238757967 HC PT THER PROC EA 15 MIN 11/15/2019 Sakina Rosario, PT GP 1          PT G-Codes  Outcome Measure Options: AM-PAC 6 Clicks Basic Mobility (PT)  AM-PAC 6 Clicks Score (PT): 18    Sakina Rosario, PT  11/15/2019

## 2019-11-15 NOTE — DISCHARGE INSTRUCTIONS
[facility], [place], operated by [facility]  [address], [address]  [place] [id number]  Phone:  [phone]  Fax:  [phone]      Matthew Alex M.D., F.A.C.S.    CARE AND INSTRUCTIONS AFTER LUMBAR FUSION    1. Wear your brace whenever you are out of bed. Put your brace on and take it off while lying down.    2. No sitting except on the commode. You may lie on a firm couch BUT no waterbed or in a recliner. Either lie on your side or stand at a counter for meals.    3. No driving. You can ride short distances in a car in the front passenger’s seat that reclines, or you may lie down in the back seat.     4. Don’t lift anything heavier than a coffee cup or paperback book.     5. Gradually increase your activity each day. You should be out of bed every hour during the day. Walk outside as soon as you feel up to doing so. Walk short distances frequently rather than making a long trip. Your goal is to be walking 1 to 3 miles per day when you return for your post-operative office visit. (NEVER DO THIS IN ONE TRIP)    6. You may climb stairs. BUT take them slowly.    7. Keep your incision clean and dry. If you notice any redness, swelling or drainage call the office @ [phone]. There are steri-strips across the incision, after 10 days please remove the strips gently.     8. You may shower five (5) days after surgery. Take your brace off before you shower. NO bending while your brace is off. Don’t let the water hit directly on the incision, gently pat dry.    9. You should have a post-operative appointment scheduled for 2 to 2 ½ weeks after surgery with our Physician Assistant or Nurse Practitioner. If you do not, please call the office when you return home from the hospital to schedule this appointment.    10. Your prescription for pain medication may be refilled for only half the original amount prior to your return office visit. Due to changes in Federal Law in order to have this medication refilled you must contact the  office four days prior to the due date and make arrangements to pick the prescription up in the office. This prescription refill cannot be called in to the pharmacy. Your prescription will be ready for pick-up the day the refill is due.     Don’t be alarmed if you continue to experience some of your pre-operative symptoms after going home. This is not un-common and usually improves within a few days but could last longer. If you have any questions please call our office at [phone].

## 2019-11-15 NOTE — PLAN OF CARE
Problem: Patient Care Overview  Goal: Plan of Care Review   11/15/19 0927   Plan of Care Review   Progress improving   OTHER   Outcome Summary pain rated 7-8/10 this am- good tolerance to ambulation on unit CGA 130ft rwx. rec rwx for home use. encouraged pt to also ambulate w nsg staff in addition to PT. will cont to prepare for DC.    Coping/Psychosocial   Plan of Care Reviewed With patient

## 2019-11-15 NOTE — PROGRESS NOTES
Name: Boni Hernandez ADMIT: 2019   : 1940  PCP: Eric Freed MD    MRN: [id number] LOS: 2 days   AGE/SEX: 79 y.o. male  ROOM: [facility]     Subjective   Subjective   Feels fine. No new complaints. Nursing reports some urinary retention. No bowel movement in two days. Tolerating a diet. No nausea or vomiting. No chest pain or dyspnea. States he does not feel ill/feverish. No abdominal pain. No cough.      Objective   Objective   Vital Signs  Temp:  [98.6 °F (37 °C)-100.8 °F (38.2 °C)] 100.6 °F (38.1 °C)  Heart Rate:  [78-97] 90  Resp:  [16-18] 18  BP: (116-142)/(49-82) 142/64  SpO2:  [94 %-96 %] 96 %  on   ;   Device (Oxygen Therapy): room air  Body mass index is 25.12 kg/m².     Physical Exam   Constitutional: He is oriented to person, place, and time. He appears well-developed and well-nourished.   HENT:   Head: Normocephalic and atraumatic.   Eyes: Conjunctivae are normal. Right eye exhibits no discharge. Left eye exhibits no discharge.   Cardiovascular: Normal rate, regular rhythm, normal heart sounds and intact distal pulses.   Pulmonary/Chest: Effort normal. No respiratory distress. He has decreased breath sounds.   Abdominal: Soft. Bowel sounds are normal. He exhibits no distension. There is no tenderness.   Musculoskeletal: Normal range of motion. He exhibits no edema or tenderness.   Neurological: He is alert and oriented to person, place, and time. He exhibits normal muscle tone. Coordination normal.   Skin: Skin is warm and dry. No erythema.   Psychiatric: He has a normal mood and affect. His behavior is normal.   Nursing note and vitals reviewed.     Results Review:       I reviewed the patient's new clinical results.  Results from last 7 days   Lab Units 11/15/19  0446 19  0508   WBC 10*3/mm3 9.15 9.22   HEMOGLOBIN g/dL 10.9* 10.8*   PLATELETS 10*3/mm3 149 177     Results from last 7 days   Lab Units 11/15/19  0446 19  1101   SODIUM mmol/L 138 134*   POTASSIUM mmol/L 4.4  4.1   CHLORIDE mmol/L 104 99   CO2 mmol/L 25.3 23.8   BUN mg/dL 10 9   CREATININE mg/dL 0.75* 0.90   GLUCOSE mg/dL 112* 122*   Estimated Creatinine Clearance: 77 mL/min (A) (by C-G formula based on SCr of 0.75 mg/dL (L)).  Results from last 7 days   Lab Units 11/14/19  1101   ALBUMIN g/dL 3.70   BILIRUBIN mg/dL 0.3   ALK PHOS U/L 58   AST (SGOT) U/L 22   ALT (SGPT) U/L 17     Results from last 7 days   Lab Units 11/15/19  0446 11/14/19  1101   CALCIUM mg/dL 8.0* 8.3*   ALBUMIN g/dL  --  3.70       No results found for: HGBA1C, POCGLU      atorvastatin 80 mg Oral Nightly   carvedilol 6.25 mg Oral Q PM   isosorbide mononitrate 30 mg Oral Daily   lisinopril 10 mg Oral Daily   polyethylene glycol 17 g Oral Daily   senna-docusate sodium 1 tablet Oral BID   sodium chloride 3 mL Intravenous Q12H   tamsulosin 0.4 mg Oral Nightly       sodium chloride 0.9 % with KCl 20 mEq 100 mL/hr Last Rate: 100 mL/hr (11/15/19 0427)   Diet Regular       Assessment/Plan     Active Hospital Problems    Diagnosis  POA   • **Spinal stenosis, lumbar region, with neurogenic claudication [M48.062]  Unknown   • Constipation [K59.00]  Yes   • Hyperlipidemia [E78.5]  Yes   • HTN (hypertension) [I10]  Yes   • Coronary artery disease [I25.10]  Yes   • BPH (benign prostatic hyperplasia) [N40.0]  Yes   • GERD (gastroesophageal reflux disease) [K21.9]  Yes   • Spondylolisthesis of lumbar region [M43.16]  Unknown      Resolved Hospital Problems   No resolved problems to display.     Mr. Hernandez is a 79 y.o. male who is s/p Lumbar 4 to lumbar 5 laminectomy with a posterior lateral fusion and instrumentation.     · He seems to be doing well thus far postoperatively.   · Current blood pressures stable on home regimen. Continue holding parameters given post-operative status. Renal function this morning okay.   · Anticipate fluctuations in BP due to blood loss, hypovolemia, anesthesia, narcotics etc.   · Stop IVF as he is eating and drinking. Electrolytes and  kidney function okay.  · Monitor renal function tomorrow morning.  · Continue Flomax for BPH. Nursing reporting some urinary retention at this time. Recommend post-void residuals/bladder scan as needed. Consult urology if retention maintains.  · Continue statin at home dose. LFTs normal yesterday.  · SCDs have been ordered for DVT prophylaxis per surgery.  · He was encouraged to use incentive spirometer as instructed.  · Mild post-operative temperature elevation noted. Likely due to atelectasis. I have re-educated on need to use IS and mobilize to prevent complications. No peripheral edema noted. Defer any pharmacological VTE prophylaxis to primary team.   · No BM in 2 days. Stool regimen ordered per primary team. Abdominal exam benign at this time.    Given mild elevation of temperatures, will follow for another day although noted plans for discharge tomorrow pending he can urinate and/or have BM.     Thank you very much for asking [facility] to be involved in this patient's care.       RUPERT Bangura  [facility]  11/15/19  3:40 PM

## 2019-11-15 NOTE — DISCHARGE SUMMARY
Boni Hernandez  1940    Patient Care Team:  Eric Freed MD as PCP - General (Family Medicine)  Mark Abraham MD as Consulting Physician (Cardiology)    Date of Admit: 11/13/2019    Date of Discharge:  11/15/2019    Discharge Diagnosis:  Spinal stenosis, lumbar region, with neurogenic claudication    Spondylolisthesis of lumbar region    Hyperlipidemia    HTN (hypertension)    Coronary artery disease    BPH (benign prostatic hyperplasia)    GERD (gastroesophageal reflux disease)    Constipation      Procedures Performed  Procedure(s):  Lumbar 4 to lumbar 5 laminectomy with a posterior lateral fusion and instrumentation       Complications: None    Consultants:   Consults     Date and Time Order Name Status Description    11/13/2019 1656 Inpatient Internal Medicine Consult Completed           Condition on Discharge: stable    Discharge disposition: home    Pertinent Test Results: None pending    Brief HPI: Patient evaluated in office for complaints of back and bilateral leg pain with numbness and tingling. Imaging revealed spondylolisthesis L4-5 with severe stenosis. RBAs of treatment were discussed including the above procedure. Patient consented to above procedure.    Hospital Course: Patient admitted for above procedure. The procedure itself was without complication. The patient was transferred to [address] [place] [address] following recovery.  He tolerated the procedure well, pain is controlled with oral medications.  Physical therapy was consulted he did well and will be discharged home today.    Discharge Physical Exam:    Temp:  [98.6 °F (37 °C)-100.8 °F (38.2 °C)] 99 °F (37.2 °C)  Heart Rate:  [78-97] 90  Resp:  [16-18] 18  BP: (116-142)/(49-82) 142/64    Current labs:  Lab Results (last 24 hours)     Procedure Component Value Units Date/Time    CBC & Differential [[id number]] Collected:  11/15/19 0446    Specimen:  Blood Updated:  11/15/19 8953    Narrative:       The following orders were created for panel order  CBC & Differential.  Procedure                               Abnormality         Status                     ---------                               -----------         ------                     CBC Auto Differential[[id number]]        Abnormal            Final result                 Please view results for these tests on the individual orders.    Basic Metabolic Panel [[id number]]  (Abnormal) Collected:  11/15/19 0446    Specimen:  Blood Updated:  11/15/19 0532     Glucose 112 mg/dL      BUN 10 mg/dL      Creatinine 0.75 mg/dL      Sodium 138 mmol/L      Potassium 4.4 mmol/L      Chloride 104 mmol/L      CO2 25.3 mmol/L      Calcium 8.0 mg/dL      eGFR Non African Amer 100 mL/min/1.73      BUN/Creatinine Ratio 13.3     Anion Gap 8.7 mmol/L     Narrative:       GFR Normal >60  Chronic Kidney Disease <60  Kidney Failure <15    CBC Auto Differential [[id number]]  (Abnormal) Collected:  11/15/19 0446    Specimen:  Blood Updated:  11/15/19 0509     WBC 9.15 10*3/mm3      RBC 3.60 10*6/mm3      Hemoglobin 10.9 g/dL      Hematocrit 32.9 %      MCV 91.4 fL      MCH 30.3 pg      MCHC 33.1 g/dL      RDW 11.9 %      RDW-SD 40.0 fl      MPV 9.8 fL      Platelets 149 10*3/mm3      Neutrophil % 68.6 %      Lymphocyte % 17.2 %      Monocyte % 12.2 %      Eosinophil % 0.9 %      Basophil % 0.3 %      Immature Grans % 0.8 %      Neutrophils, Absolute 6.28 10*3/mm3      Lymphocytes, Absolute 1.57 10*3/mm3      Monocytes, Absolute 1.12 10*3/mm3      Eosinophils, Absolute 0.08 10*3/mm3      Basophils, Absolute 0.03 10*3/mm3      Immature Grans, Absolute 0.07 10*3/mm3      nRBC 0.0 /100 WBC             General Appearance No acute distress   HEENT NC/AT;    Neurological Awake, Alert, and oriented x 3   Cranial nerves CN II-XII intact   Motor Strength 5/5, tone normal   Sensory Intact to light touch intact   Reflexes  symmetric   Cerebellar  no incoordination   Gait and station Ambulating well   Neck Supple, trachea midline   Back  lumbar  incision intact, no drainage no swelling   Extremities Moves all extremities well, no edema, no cyanosis, no redness         Discharge Medications  Ben has been reviewed and narcotic consent is on file in the patient's chart.     Your medication list      START taking these medications      Instructions Last Dose Given Next Dose Due   HYDROcodone-acetaminophen 5-325 MG per tablet  Commonly known as:  NORCO      Take 2 tablets by mouth Every 6 (Six) Hours As Needed for Moderate Pain  for up to 8 days.          CONTINUE taking these medications      Instructions Last Dose Given Next Dose Due   acetaminophen 500 MG tablet  Commonly known as:  TYLENOL      Take 1,000 mg by mouth Every 6 (Six) Hours As Needed for Mild Pain .       atorvastatin 80 MG tablet  Commonly known as:  LIPITOR      Take 80 mg by mouth Every Night.       CENTRUM SILVER PO      Take 1 tablet by mouth Daily.       clopidogrel 75 MG tablet  Commonly known as:  PLAVIX      Take 75 mg by mouth Every Evening. PT TO HOLD 5 DAYS PRIOR TO SURGERY       COREG 6.25 MG tablet  Generic drug:  carvedilol      Take 6.25 mg by mouth Every Evening.       isosorbide mononitrate 30 MG 24 hr tablet  Commonly known as:  IMDUR      Take 30 mg by mouth Daily.       lisinopril 10 MG tablet  Commonly known as:  PRINIVIL,ZESTRIL      Take 10 mg by mouth Daily.       nitroglycerin 0.4 MG SL tablet  Commonly known as:  NITROSTAT      Place 0.4 mg under the tongue Every 5 (Five) Minutes As Needed for Chest Pain. Take no more than 3 doses in 15 minutes.       pantoprazole 40 MG EC tablet  Commonly known as:  PROTONIX      Take 40 mg by mouth Daily As Needed.       tamsulosin 0.4 MG capsule 24 hr capsule  Commonly known as:  FLOMAX      Take 1 capsule by mouth Every Night.             Where to Get Your Medications      These medications were sent to [facility] - [address] [address]    Hours:  7:00AM-6PM Mon-Fri Phone:  [phone]  "  · HYDROcodone-acetaminophen 5-325 MG per tablet         Discharge Diet:     Diet Order   Procedures   • Diet Regular       Activity at Discharge: No lifting greater than 5 pounds and no driving.  Do not sit for more than a few minutes at a time.  You may walk, lie down or stand as tolerated.  May shower but do not submerge incision in a tub or pool.      Call for: questions or concerns    Follow-up Appointments  Future Appointments   Date Time Provider Department Center   12/6/2019  1:30 PM Amy Granados PA-C [facility] None     Follow-up Information     Eric Freed MD .    Specialty:  Family Medicine  Contact information:  [address] [address] [facility] [id number] [phone]                     Test Results Pending at Discharge     None    I discussed the discharge instructions with patient    Lashaun Becerra, APRN  11/15/19  4:40 PM        \"Dictated utilizing Dragon dictation\".    "

## 2019-11-15 NOTE — PROGRESS NOTES
Postop   LOS: 2 days   Patient Care Team:  Eric Freed MD as PCP - General (Family Medicine)  Mark Abraham MD as Consulting Physician (Cardiology)    Chief Complaint: Back pain    Subjective     Complains of typical postop low back pain, but improved today.  Had Dale removed 2 hours ago has not voided yet.  Has not had a bowel movement since prior to surgery.      Objective      Alert and oriented x3  Sensation intact  Motor 5/5 in lower extremities  Lung effort normal  No calf tenderness or swelling  Lumbar dressing intact  CALI drain with serosanguineous fluid  SCDs in place    Vital Signs  Temp:  [98.6 °F (37 °C)-100.8 °F (38.2 °C)] 99.4 °F (37.4 °C)  Heart Rate:  [78-97] 84  Resp:  [16-18] 18  BP: (112-139)/(45-59) 134/56       Results Review:     I reviewed the patient's new clinical results.  .  Results from last 7 days   Lab Units 11/15/19  0446 11/14/19  0508   WBC 10*3/mm3 9.15 9.22   HEMOGLOBIN g/dL 10.9* 10.8*   HEMATOCRIT % 32.9* 32.3*   PLATELETS 10*3/mm3 149 177         Assessment/Plan       Spinal stenosis, lumbar region, with neurogenic claudication    Spondylolisthesis of lumbar region    Hyperlipidemia    HTN (hypertension)    Coronary artery disease    BPH (benign prostatic hyperplasia)    GERD (gastroesophageal reflux disease)      Postop day #2 L4-5 laminectomy with posterior lateral fusion with Dr. Alex for spinal stenosis with neurogenic claudication and spondylolisthesis  He is doing well from a surgical standpoint with resolution of the leg pain.  He has the typical postop back pain but it is tolerable.  Dale catheter has been discontinued but he has not voided yet.  We will reassess later this afternoon as long as he can void and has a bowel movement and he feels up to it, may be able to discharge home later today.  Has done well with physical therapy.  Will order rolling walker per their recommendations.      Lashaun Becerra, APRN  11/15/19  8:55 AM    LOS: 2 days   Patient Care  Team:  Eric Freed MD as PCP - General (Family Medicine)  Mark Abraham MD as Consulting Physician (Cardiology)

## 2019-11-20 ENCOUNTER — TELEPHONE (OUTPATIENT)
Dept: NEUROSURGERY | Facility: CLINIC | Age: 79
End: 2019-11-20

## 2019-11-20 ENCOUNTER — OFFICE VISIT (OUTPATIENT)
Dept: NEUROSURGERY | Facility: CLINIC | Age: 79
End: 2019-11-20

## 2019-11-20 VITALS — DIASTOLIC BLOOD PRESSURE: 62 MMHG | HEART RATE: 82 BPM | SYSTOLIC BLOOD PRESSURE: 126 MMHG

## 2019-11-20 DIAGNOSIS — Z09 FOLLOW-UP EXAMINATION FOLLOWING SURGERY: Primary | ICD-10-CM

## 2019-11-20 PROCEDURE — 99024 POSTOP FOLLOW-UP VISIT: CPT | Performed by: NEUROLOGICAL SURGERY

## 2019-11-20 RX ORDER — HYDROCODONE BITARTRATE AND ACETAMINOPHEN 5; 325 MG/1; MG/1
TABLET ORAL
Qty: 35 TABLET | Refills: 0 | Status: SHIPPED | OUTPATIENT
Start: 2019-11-20 | End: 2019-12-04 | Stop reason: SDUPTHER

## 2019-11-20 RX ORDER — TIZANIDINE 4 MG/1
TABLET ORAL
COMMUNITY
Start: 2019-11-15 | End: 2022-11-10

## 2019-11-20 NOTE — TELEPHONE ENCOUNTER
How are you feeling? alright    Are you having any pain? Where? incisional pain    Rate pain from 1-10 -  5/6    Are you taking the pain RX? yes    Do you think it's helping? yes    Do you feel better than before surgery? yes    Is you incision red, swollen or bleeding? None, no fever    Confirm post op appt - yes, patient is due to see Dr Alex today for falling out of bed

## 2019-11-22 ENCOUNTER — TELEPHONE (OUTPATIENT)
Dept: NEUROSURGERY | Facility: CLINIC | Age: 79
End: 2019-11-22

## 2019-11-22 DIAGNOSIS — Z09 FOLLOW-UP EXAMINATION FOLLOWING SURGERY: Primary | ICD-10-CM

## 2019-11-22 DIAGNOSIS — M43.16 SPONDYLOLISTHESIS OF LUMBAR REGION: ICD-10-CM

## 2019-11-22 NOTE — TELEPHONE ENCOUNTER
Pt's wife called to say that after their visit in our office the other day they called their insurance company. Per pt's wife pt is eligible for home health PT.     After speaking with Home Health Patient Intake has asked us to put in a referral through Epic.     As far as I know we have never put in a referral. Home Health usually contact us. They said they do need the referral.

## 2019-11-26 ENCOUNTER — TELEPHONE (OUTPATIENT)
Dept: NEUROSURGERY | Facility: CLINIC | Age: 79
End: 2019-11-26

## 2019-11-26 DIAGNOSIS — M43.16 SPONDYLOLISTHESIS OF LUMBAR REGION: Primary | ICD-10-CM

## 2019-11-26 DIAGNOSIS — Z09 SURGERY FOLLOW-UP EXAMINATION: ICD-10-CM

## 2019-11-26 NOTE — TELEPHONE ENCOUNTER
Leticia with home health has seen Mr. Hernandez for start of care. She said he is doing so well she is recommending out patient PT and the patient would like to go to [facility]. She is also recommending he have a 3-in-1 for over the toilet and is asking if we can send an order to [place] for one.

## 2019-11-27 NOTE — TELEPHONE ENCOUNTER
Patient is aware. He is already scheduled for PT and he will contact [facility] regarding his toilet chair.

## 2019-12-02 NOTE — PROGRESS NOTES
"Subjective   Patient ID: Boni Hernandez is a 79 y.o. male is here today for a 2 WK P/O L4/5 lami/fusion by Dr Alex. Pt had CT lumbar on 11/17/19 .    History of Present Illness    He returns to the office today for first postop visit status post L4-5 laminectomy and fusion with Dr Alex on November 13, 2019.  He was seen in the office postop on November 20 due to a fall from his bed resulting in an emergency room visit at [facility].  Reported increased pain at that time.  Postoperative CT imaging revealed good alignment of the hardware at L4-5.    He reports he is doing and feeling very well.  He denies any leg pain and only has minimal low back discomfort.  He continues taking 2-3 hydrocodone daily but will wean off as tolerated.  He is requesting an additional hydrocodone refill.  He starts physical therapy tomorrow.  He is walking with a straight cane for stability.  He denies any new problems.  He is pleased with his postoperative status.      /46   Pulse 68   Temp 96.5 °F (35.8 °C)   Ht 170.2 cm (67\")   Wt 68.5 kg (151 lb)   BMI 23.65 kg/m²     The following portions of the patient's history were reviewed and updated as appropriate: allergies, current medications, past family history, past medical history, past social history, past surgical history and problem list.    Review of Systems   Constitutional: Negative for chills and fever.   Genitourinary: Negative for difficulty urinating and enuresis.   Musculoskeletal: Positive for back pain and gait problem.   Neurological: Positive for numbness (L leg). Negative for weakness.   Psychiatric/Behavioral: Negative for sleep disturbance.       Objective   Physical Exam   Constitutional: He is oriented to person, place, and time. He appears well-developed and well-nourished. He is cooperative.  Non-toxic appearance. He does not have a sickly appearance. He does not appear ill.   Very pleasant well-appearing older gentleman   HENT:   Head: Normocephalic and " atraumatic.   Eyes:   Corrective lenses   Neck: Neck supple. No tracheal deviation present.   Pulmonary/Chest: Effort normal.   Musculoskeletal: Normal range of motion. He exhibits no tenderness or deformity.        Lumbar back: He exhibits normal range of motion, no tenderness and no pain.   Strength is equal and intact bilateral lower extremities  Deferred lumbar range of motion exam   Neurological: He is alert and oriented to person, place, and time. He has normal strength. He displays no tremor and normal reflexes. No sensory deficit. He exhibits normal muscle tone. Coordination and gait normal. GCS eye subscore is 4. GCS verbal subscore is 5. GCS motor subscore is 6.   Gait is stable and upright  Sensation intact bilateral lower extremities   Skin: Skin is warm and dry.   Well-healing midline lumbar incision site, well approximated, normal surrounding skin, residual surgical glue   Psychiatric: He has a normal mood and affect. His behavior is normal. Thought content normal.   Vitals reviewed.    Neurologic Exam     Mental Status   Oriented to person, place, and time.     Motor Exam     Strength   Strength 5/5 throughout.       Assessment/Plan   Independent Review of Radiographic Studies:    No new imaging    Medical Decision Making:    He returns to the office today for second postop visit status post lumbar decompression and fusion.  Leg pain has resolved but he does continue with ongoing low back discomfort.  I did give him an additional refill of hydrocodone, 25 tablets, but told him this would be the last refill.  He should begin to wean off the medication as tolerated.  He can also take Tylenol as needed.  He is avoid bending and twisting at the waist.  Okay lifting upwards of 15 to 20 pounds.  He is starting physical therapy tomorrow.  The incision site is healing well.  Overall he is doing great and is pleased with his postoperative status.  We will see him back in 1 month for clinical follow-up with  lumbar x-rays.    Plan: Take hydrocodone as directed, return to office in 1 month with x-rays, begin physical therapy as directed  Boni was seen today for post-op.    Diagnoses and all orders for this visit:    Spinal stenosis, lumbar region, with neurogenic claudication  -     XR Spine Lumbar 2 or 3 View; Future    DDD (degenerative disc disease), lumbar  -     XR Spine Lumbar 2 or 3 View; Future    Other orders  -     HYDROcodone-acetaminophen (NORCO) 5-325 MG per tablet; Take 1 tablet every 6 hours as needed for pain.      Return in about 4 weeks (around 1/1/2020).

## 2019-12-04 ENCOUNTER — OFFICE VISIT (OUTPATIENT)
Dept: NEUROSURGERY | Facility: CLINIC | Age: 79
End: 2019-12-04

## 2019-12-04 VITALS
HEIGHT: 67 IN | SYSTOLIC BLOOD PRESSURE: 104 MMHG | WEIGHT: 151 LBS | HEART RATE: 68 BPM | TEMPERATURE: 96.5 F | DIASTOLIC BLOOD PRESSURE: 46 MMHG | BODY MASS INDEX: 23.7 KG/M2

## 2019-12-04 DIAGNOSIS — M48.062 SPINAL STENOSIS, LUMBAR REGION, WITH NEUROGENIC CLAUDICATION: Primary | ICD-10-CM

## 2019-12-04 DIAGNOSIS — M51.36 DDD (DEGENERATIVE DISC DISEASE), LUMBAR: ICD-10-CM

## 2019-12-04 PROCEDURE — 99024 POSTOP FOLLOW-UP VISIT: CPT | Performed by: NURSE PRACTITIONER

## 2019-12-04 RX ORDER — HYDROCODONE BITARTRATE AND ACETAMINOPHEN 5; 325 MG/1; MG/1
TABLET ORAL
Qty: 25 TABLET | Refills: 0 | Status: SHIPPED | OUTPATIENT
Start: 2019-12-04 | End: 2022-10-20 | Stop reason: HOSPADM

## 2019-12-10 ENCOUNTER — TREATMENT (OUTPATIENT)
Dept: PHYSICAL THERAPY | Facility: CLINIC | Age: 79
End: 2019-12-10

## 2019-12-10 DIAGNOSIS — Z74.09 DECREASED FUNCTIONAL MOBILITY AND ENDURANCE: ICD-10-CM

## 2019-12-10 DIAGNOSIS — M54.50 ACUTE BILATERAL LOW BACK PAIN WITHOUT SCIATICA: Primary | ICD-10-CM

## 2019-12-10 DIAGNOSIS — M48.062 SPINAL STENOSIS, LUMBAR REGION, WITH NEUROGENIC CLAUDICATION: ICD-10-CM

## 2019-12-10 DIAGNOSIS — Z98.1 S/P LUMBAR SPINAL FUSION: ICD-10-CM

## 2019-12-10 PROCEDURE — 97110 THERAPEUTIC EXERCISES: CPT | Performed by: PHYSICAL THERAPIST

## 2019-12-10 PROCEDURE — 97161 PT EVAL LOW COMPLEX 20 MIN: CPT | Performed by: PHYSICAL THERAPIST

## 2019-12-10 PROCEDURE — 97530 THERAPEUTIC ACTIVITIES: CPT | Performed by: PHYSICAL THERAPIST

## 2019-12-10 NOTE — PROGRESS NOTES
"Physical Therapy Initial Evaluation and Plan of Care    Patient: Boni Hernandez   : 1940  Diagnosis/ICD-10 Code:  Acute bilateral low back pain without sciatica [M54.5]  Referring practitioner: Matthew Alex MD    Subjective Evaluation    History of Present Illness  Date of surgery: 2019  Mechanism of injury: S/p L4-5 laminectomy and fusion by Dr. Alex.  Post-op course has been uncomplicated and patient reports he is doing well with improving pain. Patient reports he had a home health PT evaluation and was referred on to outpatient.      \"My back is still pretty sore but I don't have any pain in my legs at all.\"  Reports difficulty standing up from chair, standing, and walking. Sleeping well without pain interruption.      Patient Occupation: Retired   Precautions and Work Restrictions: working in the yardQuality of life: good    Pain  Pain scale: 1-2/10.  Pain scale at lowest: 1-2/10.  At worst pain ratin  Location: central lower lumbar   Quality: sore.  Relieving factors: medications (hydrocodone)  Aggravating factors: lifting, standing, ambulation, prolonged positioning and squatting  Progression: improved    Social Support  Lives in: one-story house (3 steps to enter home)  Lives with: spouse    Hand dominance: right    Diagnostic Tests  No diagnostic tests performed    Treatments  Current treatment: physical therapy  Discharged from (in last 30 days): inpatient hospitalization  Patient Goals  Patient goals for therapy: decreased pain, increased motion and increased strength  Patient goal: \"I don't really know; I thought I was doing good\"           Objective       Tenderness     Additional Tenderness Details  Min (+) TTP over superior aspect of lumbar incision  Surgical incision is well-healing with intact scab at superior aspect but without unusual redness or signs of infection    Active Range of Motion     Additional Active Range of Motion Details  Lumbar AROM (%):  Flexion 50%  Left " sidebend 25%  Right sidebend 25%  Left rotation 25%  Right rotation 25%    Strength/Myotome Testing     Left Hip   Planes of Motion   Flexion: 4+    Right Hip   Planes of Motion   Flexion: 4+    Left Knee   Left knee flexion strength: 5-/5.  Left knee extension strength: 5-/5.    Right Knee   Right knee flexion strength: 5-/5.  Right knee extension strength: 5-/5.    Left Ankle/Foot   Dorsiflexion: 4+    Right Ankle/Foot   Dorsiflexion: 4+    Ambulation     Observational Gait     Additional Observational Gait Details  Ambulates with rigid trunk including decreased trunk rotation (B) and decreased (L) knee flexion during swing phase         Assessment & Plan     Assessment  Impairments: abnormal coordination, abnormal gait, abnormal muscle firing, abnormal or restricted ROM, activity intolerance, impaired physical strength, lacks appropriate home exercise program and pain with function  Assessment details: Patient is a 79 y.o male s/p L4-5 laminectomy and fusion on 11/13/19. He reports resolution of LE symptoms following surgery and improving lumbar pain and soreness.  He reports symptoms 1-8/10, worst with standing and walking.  He denies sleep interruption.  He exhibits gait compensation, decreased lumbar AROM, and decreased core and proximal (B) LE strength.  His Oswestry score is 20%, indicating mild to moderate perceived level of physical disability.  He will benefit from skilled PT services to address these deficits and assist patient in regaining optimal level of functional mobility for improved QOL.  Prognosis: good  Functional Limitations: lifting, walking, uncomfortable because of pain and standing  Goals  Plan Goals: STGs: to be met in 3 weeks  1. Patient will be independent with initial HEP  2. Patient will report improved lumbar symptoms 0-4j/10 for improved comfort with ADLs  3. Patient will demonstrate improved lumbar AROM all planes except extension 75% for improved trunk mobility and positional  tolerance  4. Patient will ambulate with 25% decreased gait compensation over short community distances    LTGs: to be met in 6 weeks  1. Patient will be independent with progressed strengthening HEP  2. Patient will report improved lumbar symptoms 0-2/10 for improved ADL tolerance  3. Patient will demonstrate functional core strength and improved (B) LE strength >/= 5/5 for improved functional mobility  4. Patient will consistently ambulate community distances with minimal antalgia for improved community mobility  5. Patient will have improved Oswestry score </= 10% for subjective evidence of functional improvement    Plan  Therapy options: will be seen for skilled physical therapy services  Planned modality interventions: cryotherapy, electrical stimulation/Russian stimulation and thermotherapy (hydrocollator packs)  Planned therapy interventions: abdominal trunk stabilization, ADL retraining, body mechanics training, fine motor coordination training, flexibility, functional ROM exercises, gait training, home exercise program, joint mobilization, manual therapy, motor coordination training, neuromuscular re-education, postural training, soft tissue mobilization, spinal/joint mobilization, strengthening, stretching and therapeutic activities  Duration in visits: 10  Treatment plan discussed with: patient        Manual Therapy:         mins  02628;  Therapeutic Exercise:    15     mins  41731;     Neuromuscular Sintia:        mins  60671;    Therapeutic Activity:     11     mins  22618;     Gait Training:           mins  73823;     Ultrasound:          mins  25938;    Electrical Stimulation:         mins  39593 ( );  Dry Needling          mins self-pay    Timed Treatment:   26   mins   Total Treatment:     44   mins    PT SIGNATURE: Katie Garcia PT, DPT   DATE TREATMENT INITIATED: 12/10/2019    Medicare Initial Certification  Certification Period: 3/9/2020  I certify that the therapy services are furnished  while this patient is under my care.  The services outlined above are required by this patient, and will be reviewed every 90 days.     PHYSICIAN: Matthew Alex MD      DATE:     Please sign and return via fax to [phone].. Thank you, [facility].

## 2019-12-13 ENCOUNTER — TREATMENT (OUTPATIENT)
Dept: PHYSICAL THERAPY | Facility: CLINIC | Age: 79
End: 2019-12-13

## 2019-12-13 DIAGNOSIS — M54.50 ACUTE BILATERAL LOW BACK PAIN WITHOUT SCIATICA: Primary | ICD-10-CM

## 2019-12-13 DIAGNOSIS — Z74.09 DECREASED FUNCTIONAL MOBILITY AND ENDURANCE: ICD-10-CM

## 2019-12-13 DIAGNOSIS — M48.062 SPINAL STENOSIS, LUMBAR REGION, WITH NEUROGENIC CLAUDICATION: ICD-10-CM

## 2019-12-13 DIAGNOSIS — Z98.1 S/P LUMBAR SPINAL FUSION: ICD-10-CM

## 2019-12-13 PROCEDURE — 97110 THERAPEUTIC EXERCISES: CPT | Performed by: PHYSICAL THERAPIST

## 2019-12-13 NOTE — PROGRESS NOTES
"Physical Therapy Daily Progress Note    Visit # 2    Boni Hernandez reports: \"My back is sore from walking more but I think overall it's doing a little better.\"     Subjective Evaluation    Pain  Pain scale: 4-5/10.  Location: central lumbar to lower thoracic region           Objective   See Exercise, Manual, and Modality Logs for complete treatment.   *Initiated hip add vs ball, PPT, and seated lumbar flexion    Assessment & Plan     Assessment  Assessment details: Patient requires increased time to perform all stretching/ROM activities but does so without symptom provocation.  He responds positively to application of MH following exercise this date and overall reports decreased symptoms upon completion of session compared to start of session.  Updated HEP printout is issued to facilitate compliance and recall.        Progress per Plan of Care         Manual Therapy:         mins  48269;  Therapeutic Exercise:    38     mins  55572;     Neuromuscular Sintia:        mins  32085;    Therapeutic Activity:          mins  79016;     Gait Training:           mins  11455;     Ultrasound:          mins  15460;    Electrical Stimulation:         mins  44460 ( );  Dry Needling          mins self-pay    Timed Treatment:   38   mins   Total Treatment:     48   mins    Katie Garcia PT, DPT  Physical Therapist  [place] License # [id number]    "

## 2019-12-17 ENCOUNTER — TREATMENT (OUTPATIENT)
Dept: PHYSICAL THERAPY | Facility: CLINIC | Age: 79
End: 2019-12-17

## 2019-12-17 DIAGNOSIS — Z74.09 DECREASED FUNCTIONAL MOBILITY AND ENDURANCE: ICD-10-CM

## 2019-12-17 DIAGNOSIS — M48.062 SPINAL STENOSIS, LUMBAR REGION, WITH NEUROGENIC CLAUDICATION: ICD-10-CM

## 2019-12-17 DIAGNOSIS — M54.50 ACUTE BILATERAL LOW BACK PAIN WITHOUT SCIATICA: Primary | ICD-10-CM

## 2019-12-17 DIAGNOSIS — Z98.1 S/P LUMBAR SPINAL FUSION: ICD-10-CM

## 2019-12-17 PROCEDURE — 97110 THERAPEUTIC EXERCISES: CPT | Performed by: PHYSICAL THERAPIST

## 2019-12-17 NOTE — PROGRESS NOTES
"Physical Therapy Daily Progress Note    Visit # 3    Boni Hernandez reports: \"My back is a little sore but not painful.  I felt pretty good after therapy the other day.\"    Subjective     Objective   See Exercise, Manual, and Modality Logs for complete treatment.   *Initiated abdominal bracing    Assessment & Plan     Assessment  Assessment details: Patient performs activities without symptom provocation but does exhibit apparent stiffness and limited ROM and lumbar spine and pelvis.  He exhibits early onset of muscular fatigue with deep abdominal retraining activity and is educated in the relationship between deep core musculature and improved functional stability for the lumbar spine, notably beneficial post-operatively.  He verbalizes understanding.        Progress per Plan of Care. Consider glut sets.         Manual Therapy:         mins  07489;  Therapeutic Exercise:    29     mins  26511;     Neuromuscular Sintia:        mins  60323;    Therapeutic Activity:          mins  01852;     Gait Training:           mins  94813;     Ultrasound:          mins  43634;    Electrical Stimulation:         mins  96584 ( );  Dry Needling          mins self-pay    Timed Treatment:   29   mins   Total Treatment:     36   mins    Katie Garcia PT, DPT  Physical Therapist  [place] License # [id number]    "

## 2019-12-20 ENCOUNTER — TREATMENT (OUTPATIENT)
Dept: PHYSICAL THERAPY | Facility: CLINIC | Age: 79
End: 2019-12-20

## 2019-12-20 DIAGNOSIS — Z98.1 S/P LUMBAR SPINAL FUSION: ICD-10-CM

## 2019-12-20 DIAGNOSIS — Z74.09 DECREASED FUNCTIONAL MOBILITY AND ENDURANCE: ICD-10-CM

## 2019-12-20 DIAGNOSIS — M54.50 ACUTE BILATERAL LOW BACK PAIN WITHOUT SCIATICA: Primary | ICD-10-CM

## 2019-12-20 DIAGNOSIS — M48.062 SPINAL STENOSIS, LUMBAR REGION, WITH NEUROGENIC CLAUDICATION: ICD-10-CM

## 2019-12-20 PROCEDURE — 97110 THERAPEUTIC EXERCISES: CPT | Performed by: PHYSICAL THERAPIST

## 2019-12-20 NOTE — PROGRESS NOTES
"Physical Therapy Daily Progress Note    Visit # 4    Boni Hernandez reports: \"My back is a little sore this morning, but yesterday was the best day I have had so far.  It actually stopped hurting for the first time since surgery and I didn't have to take any pain pills yesterday.\"    Subjective     Objective   See Exercise, Manual, and Modality Logs for complete treatment.   *Initiated sidelying clamshells, LAQ, and sit to stand    Assessment & Plan     Assessment  Assessment details: Patient reports first painfree day since surgery which is highly encouraging.  His symptoms appear to be transitioning to more of a low grade discomfort or soreness rather than acute pain.  He requires some encouragement but is able to perform sit to stand with appropriate forward weightshift and without UE assist, but he does exhibit mild signs of lack of stability upon immediately standing.  He will benefit from a short course of progressed strength and stabilization activities for improved function and mobility.        Progress strengthening /stabilization /functional activity         Manual Therapy:         mins  37593;  Therapeutic Exercise:    38     mins  51777;     Neuromuscular Sintia:        mins  27465;    Therapeutic Activity:          mins  80255;     Gait Training:           mins  66006;     Ultrasound:          mins  42235;    Electrical Stimulation:         mins  73061 ( );  Dry Needling          mins self-pay    Timed Treatment:   38   mins   Total Treatment:     42   mins    Katie Garcia PT, DPT  Physical Therapist  [place] License # [id number]    "

## 2019-12-24 ENCOUNTER — TREATMENT (OUTPATIENT)
Dept: PHYSICAL THERAPY | Facility: CLINIC | Age: 79
End: 2019-12-24

## 2019-12-24 DIAGNOSIS — Z98.1 S/P LUMBAR SPINAL FUSION: ICD-10-CM

## 2019-12-24 DIAGNOSIS — M54.50 ACUTE BILATERAL LOW BACK PAIN WITHOUT SCIATICA: Primary | ICD-10-CM

## 2019-12-24 DIAGNOSIS — Z74.09 DECREASED FUNCTIONAL MOBILITY AND ENDURANCE: ICD-10-CM

## 2019-12-24 DIAGNOSIS — M48.062 SPINAL STENOSIS, LUMBAR REGION, WITH NEUROGENIC CLAUDICATION: ICD-10-CM

## 2019-12-24 PROCEDURE — 97110 THERAPEUTIC EXERCISES: CPT | Performed by: PHYSICAL THERAPIST

## 2019-12-24 NOTE — PROGRESS NOTES
"Physical Therapy Daily Progress Note    Visit # 5    Boni Hernandez reports: \"My back feels pretty good where they cut on it, but it's hurting up above that now.  It started 2 or 3 days ago.\"    Subjective Evaluation    Pain  Pain scale: 4-5/10.  Location: (B) lower thoracic region           Objective   See Exercise, Manual, and Modality Logs for complete treatment.   *Initiated seated hamstrings stretch    Assessment & Plan     Assessment  Assessment details: Patient exhibits mild lack of stability during initial sit to stand transition due to poor stabilization of lumbar spine via deep abdominal musculature.  He benefits from verbal and tactile cueing for proper transverse abdominis activation with fair return and early onset of muscular fatigue.  While he is doing well with proximal LE strengthening, he will benefit from a short course of continued PT to progress deep abdominal retraining for reduced excessive and aberrant spinal loading during transitional movements and functional activities.         Progress per Plan of Care - emphasis on deep abdominal retraining to more effectively provide functional support to  lumbar spine.         Manual Therapy:         mins  30687;  Therapeutic Exercise:    39     mins  16563;     Neuromuscular Sintia:        mins  83302;    Therapeutic Activity:          mins  17899;     Gait Training:           mins  35740;     Ultrasound:          mins  84249;    Electrical Stimulation:         mins  72809 ( );  Dry Needling          mins self-pay    Timed Treatment:   39   mins   Total Treatment:     39   mins    Katie Garcia PT, DPT  Physical Therapist  [place] License # [id number]    "

## 2019-12-27 ENCOUNTER — TREATMENT (OUTPATIENT)
Dept: PHYSICAL THERAPY | Facility: CLINIC | Age: 79
End: 2019-12-27

## 2019-12-27 DIAGNOSIS — Z98.1 S/P LUMBAR SPINAL FUSION: ICD-10-CM

## 2019-12-27 DIAGNOSIS — M48.062 SPINAL STENOSIS, LUMBAR REGION, WITH NEUROGENIC CLAUDICATION: ICD-10-CM

## 2019-12-27 DIAGNOSIS — M54.50 ACUTE BILATERAL LOW BACK PAIN WITHOUT SCIATICA: Primary | ICD-10-CM

## 2019-12-27 DIAGNOSIS — Z74.09 DECREASED FUNCTIONAL MOBILITY AND ENDURANCE: ICD-10-CM

## 2019-12-27 PROCEDURE — 97110 THERAPEUTIC EXERCISES: CPT | Performed by: PHYSICAL THERAPIST

## 2019-12-27 NOTE — PROGRESS NOTES
"Physical Therapy Daily Progress Note    Visit # 6    Boni Hernandez reports: \"My back is feeling good today.  Actually most every day my back feels really good.\"    Subjective Evaluation    Pain  Current pain ratin           Objective       Active Range of Motion     Additional Active Range of Motion Details  Lumbar AROM grossly 50-75% all planes except extension not assessed     See Exercise, Manual, and Modality Logs for complete treatment.   *Incorporated abdominal bracing into sit to stands    Assessment & Plan     Assessment  Assessment details: Patient consistently performs all activities without symptom provocation and requiring minimal cueing.  He is motivated to pursue progression to independence with HEP.  He has met several functional PT goals and is progressing toward the remainder.  At this time he exhibits readiness for D/C from skilled PT services to self management with HEP.    Goals  Plan Goals: STGs:  1. Patient will be independent with initial HEP - MET  2. Patient will report improved lumbar symptoms 0-4/10 for improved comfort with ADLs - MET  3. Patient will demonstrate improved lumbar AROM all planes except extension 75% for improved trunk mobility and positional tolerance - PARTIALLY MET  4. Patient will ambulate with 25% decreased gait compensation over short community distances - MET    LTGs:   1. Patient will be independent with progressed strengthening HEP - MET  2. Patient will report improved lumbar symptoms 0-2/10 for improved ADL tolerance - MET  3. Patient will demonstrate functional core strength and improved (B) LE strength >/= 5/5 for improved functional mobility - NOT MET  4. Patient will consistently ambulate community distances with minimal antalgia for improved community mobility - PARTIALLY MET  5. Patient will have improved Oswestry score </= 10% for subjective evidence of functional improvement - NOT ASSESSED        Other - D/C to independence with HEP.         Manual " Therapy:         mins  74490;  Therapeutic Exercise:    38     mins  02712;     Neuromuscular Sintia:        mins  44441;    Therapeutic Activity:          mins  48805;     Gait Training:           mins  99904;     Ultrasound:          mins  53403;    Electrical Stimulation:         mins  83332 ( );  Dry Needling          mins self-pay    Timed Treatment:   38   mins   Total Treatment:     38   mins    Katie Garcia PT, DPT  Physical Therapist  [place] License # [id number]

## 2020-01-06 ENCOUNTER — OFFICE VISIT (OUTPATIENT)
Dept: NEUROSURGERY | Facility: CLINIC | Age: 80
End: 2020-01-06

## 2020-01-06 ENCOUNTER — HOSPITAL ENCOUNTER (OUTPATIENT)
Dept: GENERAL RADIOLOGY | Facility: HOSPITAL | Age: 80
Discharge: HOME | End: 2020-01-06
Admitting: NURSE PRACTITIONER

## 2020-01-06 VITALS
HEART RATE: 78 BPM | SYSTOLIC BLOOD PRESSURE: 121 MMHG | DIASTOLIC BLOOD PRESSURE: 65 MMHG | BODY MASS INDEX: 23.7 KG/M2 | WEIGHT: 151 LBS | HEIGHT: 67 IN

## 2020-01-06 DIAGNOSIS — Z09 SURGERY FOLLOW-UP EXAMINATION: Primary | ICD-10-CM

## 2020-01-06 DIAGNOSIS — M48.062 SPINAL STENOSIS, LUMBAR REGION, WITH NEUROGENIC CLAUDICATION: ICD-10-CM

## 2020-01-06 DIAGNOSIS — M51.36 DDD (DEGENERATIVE DISC DISEASE), LUMBAR: ICD-10-CM

## 2020-01-06 PROCEDURE — 99024 POSTOP FOLLOW-UP VISIT: CPT | Performed by: PHYSICIAN ASSISTANT

## 2020-01-06 PROCEDURE — 72100 X-RAY EXAM L-S SPINE 2/3 VWS: CPT

## 2020-01-30 ENCOUNTER — DOCUMENTATION (OUTPATIENT)
Dept: PHYSICAL THERAPY | Facility: CLINIC | Age: 80
End: 2020-01-30

## 2020-01-30 DIAGNOSIS — M48.062 SPINAL STENOSIS, LUMBAR REGION, WITH NEUROGENIC CLAUDICATION: ICD-10-CM

## 2020-01-30 DIAGNOSIS — M54.50 ACUTE BILATERAL LOW BACK PAIN WITHOUT SCIATICA: Primary | ICD-10-CM

## 2020-01-30 DIAGNOSIS — Z98.1 S/P LUMBAR SPINAL FUSION: ICD-10-CM

## 2020-01-30 DIAGNOSIS — Z74.09 DECREASED FUNCTIONAL MOBILITY AND ENDURANCE: ICD-10-CM

## 2020-01-30 NOTE — PROGRESS NOTES
Discharge Summary  Discharge Summary from Physical Therapy Report    Patient Information  Boni Hernandez  1940    Dates  PT visit: 12/10/19 - 12/27/19  Number of Visits: 6     Discharge Status of Patient: See final note dated 12/27/19  Goals: Partially Met    Visit Diagnoses:    ICD-10-CM ICD-9-CM   1. Acute bilateral low back pain without sciatica M54.5 724.2     338.19   2. S/P lumbar spinal fusion Z98.1 V45.4   3. Decreased functional mobility and endurance Z74.09 780.99   4. Spinal stenosis, lumbar region, with neurogenic claudication M48.062 724.03       Discharge Plan: Continue with current home exercise program as instructed    Date of Discharge 01/30/19        Katie Garcia, PT, DPT  Physical Therapist

## 2021-03-15 ENCOUNTER — BULK ORDERING (OUTPATIENT)
Dept: CASE MANAGEMENT | Facility: OTHER | Age: 81
End: 2021-03-15

## 2021-03-15 DIAGNOSIS — Z23 IMMUNIZATION DUE: ICD-10-CM

## 2021-12-06 ENCOUNTER — TELEPHONE (OUTPATIENT)
Dept: NEUROSURGERY | Facility: CLINIC | Age: 81
End: 2021-12-06

## 2021-12-06 NOTE — TELEPHONE ENCOUNTER
CALLED PATIENT TO [facility], LVM FOR PATIENT TO CALL BACK

## 2021-12-06 NOTE — TELEPHONE ENCOUNTER
PATIENT HAD LUMBAR SURG IN 2019 AND HAS BEEN EXPERIENCING PAIN FOR ABOUT 6 MNTHS, HE IS HAVING TINGLING AND NUMBNESS IN HIS RIGHT SIDE. THIS MORNING HE COULD BARELY GET OUT OF BED. HE WOULD LIKE TO SCHEDULE AN APPT AND HAS NO CURRENT IMAGING.  PLEASE ADVISE ON SCHEDULING.    [phone]

## 2022-01-18 ENCOUNTER — TELEPHONE (OUTPATIENT)
Dept: NEUROSURGERY | Facility: CLINIC | Age: 82
End: 2022-01-18

## 2022-02-01 ENCOUNTER — OFFICE VISIT (OUTPATIENT)
Dept: NEUROSURGERY | Facility: CLINIC | Age: 82
End: 2022-02-01

## 2022-02-01 VITALS
WEIGHT: 151 LBS | HEIGHT: 67 IN | DIASTOLIC BLOOD PRESSURE: 80 MMHG | HEART RATE: 92 BPM | SYSTOLIC BLOOD PRESSURE: 131 MMHG | TEMPERATURE: 98 F | BODY MASS INDEX: 23.7 KG/M2

## 2022-02-01 DIAGNOSIS — M51.36 DDD (DEGENERATIVE DISC DISEASE), LUMBAR: Primary | ICD-10-CM

## 2022-02-01 PROCEDURE — 99213 OFFICE O/P EST LOW 20 MIN: CPT | Performed by: NEUROLOGICAL SURGERY

## 2022-03-13 ENCOUNTER — HOSPITAL ENCOUNTER (OUTPATIENT)
Dept: MRI IMAGING | Facility: HOSPITAL | Age: 82
Discharge: HOME | End: 2022-03-13
Admitting: NEUROLOGICAL SURGERY

## 2022-03-13 DIAGNOSIS — M51.36 DDD (DEGENERATIVE DISC DISEASE), LUMBAR: ICD-10-CM

## 2022-03-13 PROCEDURE — 72158 MRI LUMBAR SPINE W/O & W/DYE: CPT

## 2022-03-13 PROCEDURE — 82565 ASSAY OF CREATININE: CPT

## 2022-03-13 PROCEDURE — 0 GADOBENATE DIMEGLUMINE 529 MG/ML SOLUTION: Performed by: NEUROLOGICAL SURGERY

## 2022-03-13 PROCEDURE — A9577 INJ MULTIHANCE: HCPCS | Performed by: NEUROLOGICAL SURGERY

## 2022-03-13 RX ADMIN — GADOBENATE DIMEGLUMINE 14 ML: 529 INJECTION, SOLUTION INTRAVENOUS at 11:01

## 2022-03-14 LAB — CREAT BLDA-MCNC: 1.3 MG/DL (ref 0.6–1.3)

## 2022-03-21 NOTE — PROGRESS NOTES
Subjective   Patient ID: Boni Hernandez is a 81 y.o. male is here today via telephone for follow-up with a new MRI/XR Lumbar done on 03.13.2022 at [facility].    You have chosen to receive care through a telehealth visit.  Do you consent to use a video/audio connection for your medical care today? Yes    We had a telephone visit today.  The patient was at home and I was in the office.  We talked for 5 minutes.    History of Present Illness     This patient continues with pain primarily in his right leg.  He has some pain in his lower back off to the right side as well.  Since I saw him in February he feels that the problem has been getting worse.    The following portions of the patient's history were reviewed and updated as appropriate: allergies, current medications, past family history, past medical history, past social history, past surgical history and problem list.    Review of Systems    I have reviewed the review of systems as documented by my MA.      Objective       Physical Exam  Neurological:      Mental Status: He is alert and oriented to person, place, and time.       Neurologic Exam     Mental Status   Oriented to person, place, and time.           Assessment/Plan   Independent Review of Radiographic Studies:      I personally reviewed the images from the following studies.    I reviewed an MRI of his lumbar spine done on 13 March of this year.  This shows fairly significant stenosis at L3-4 which is the level above the fusion.  The fused level looks okay.  There is a little foraminal stenosis at L5-S1.    Medical Decision Making:      I told the patient I thought the problem was coming from the stenosis at L3-4.  At his age it would be better to try and treat him with further nonsurgical treatment.  To that end I recommended a series of lumbar epidural blocks.  I told him to call me after the blocks and if he is no better we will get him back in the office to discuss a myelogram and potential further  surgery.    Diagnoses and all orders for this visit:    1. Spinal stenosis of lumbar region with neurogenic claudication (Primary)  -     Epidural Block      Return for Recheck and call after treatment or consultation.

## 2022-03-24 ENCOUNTER — OFFICE VISIT (OUTPATIENT)
Dept: NEUROSURGERY | Facility: CLINIC | Age: 82
End: 2022-03-24

## 2022-03-24 DIAGNOSIS — M48.062 SPINAL STENOSIS OF LUMBAR REGION WITH NEUROGENIC CLAUDICATION: Primary | ICD-10-CM

## 2022-03-24 PROBLEM — M48.061 LUMBAR SPINAL STENOSIS: Status: ACTIVE | Noted: 2019-05-16

## 2022-03-24 PROBLEM — Z09 SURGERY FOLLOW-UP EXAMINATION: Status: RESOLVED | Noted: 2020-01-06 | Resolved: 2022-03-24

## 2022-03-24 PROCEDURE — 99441 PR PHYS/QHP TELEPHONE EVALUATION 5-10 MIN: CPT | Performed by: NEUROLOGICAL SURGERY

## 2022-06-06 ENCOUNTER — TELEPHONE (OUTPATIENT)
Dept: NEUROSURGERY | Facility: CLINIC | Age: 82
End: 2022-06-06

## 2022-06-06 DIAGNOSIS — M48.062 SPINAL STENOSIS OF LUMBAR REGION WITH NEUROGENIC CLAUDICATION: Primary | ICD-10-CM

## 2022-06-06 NOTE — TELEPHONE ENCOUNTER
S/W patient and informed that we have put in a new order for epidural blocks, but if Dr. Jha wants him to have an MRI, then he has to be the one to order the MRI.     Patient expressed understanding

## 2022-06-06 NOTE — TELEPHONE ENCOUNTER
Caller: Mary Boni    Relationship: Self    Best call back number: [phone]    What orders are you requesting (i.e. lab or imaging): EPIDURAL ORDER/ REFERRAL AND A FOLLOW UP MRI    In what timeframe would the patient need to come in: ASAP    Where will you receive your lab/imaging services: DR. BALDWIN ON [facility].     Additional notes: PT PREVIOUS SCHEDULED 3/2022 BUT WAS NOT READY TO HAVE INJECTIONS AT THIS TIME.  HOWEVER, PT STATES PAIN HAS GOTTEN WORSE.  HE CALLED DR. BALDWIN OFFICE THEY STATE NEEDS A NEW REFERRAL AND AN ORDER FOR MRI.     DR. BALDWIN OFFICE PH # [phone].   FAX:  [phone]    THANK YOU

## 2022-08-23 ENCOUNTER — TELEPHONE (OUTPATIENT)
Dept: NEUROSURGERY | Facility: CLINIC | Age: 82
End: 2022-08-23

## 2022-08-23 NOTE — TELEPHONE ENCOUNTER
Patient said he had SILVINO with no relief. He would like to know what the next step would be. He is having trouble walking and using cane when walking.

## 2022-08-23 NOTE — TELEPHONE ENCOUNTER
The neck step would be to do a myelogram and potentially surgery after that.  If he thinks he might be interested in that that we need to have him come back to the office so I can discuss it with him face-to-face or we could do a telephone visit if he preferred.

## 2022-08-26 NOTE — TELEPHONE ENCOUNTER
S/w patient and informed of appt on 09/22/2022 with Dr. Clark to discuss myelogram _________________________________________________LM FOR PATIENT TO CALL THE OFFICE BACK TO RELAY DR. CLARK' RESPONSE TO HIS MESSAGE

## 2022-09-22 ENCOUNTER — OFFICE VISIT (OUTPATIENT)
Dept: NEUROSURGERY | Facility: CLINIC | Age: 82
End: 2022-09-22

## 2022-09-22 DIAGNOSIS — M48.062 SPINAL STENOSIS OF LUMBAR REGION WITH NEUROGENIC CLAUDICATION: Primary | ICD-10-CM

## 2022-09-22 PROCEDURE — 99214 OFFICE O/P EST MOD 30 MIN: CPT | Performed by: NEUROLOGICAL SURGERY

## 2022-09-22 RX ORDER — DEXAMETHASONE 4 MG/1
8 TABLET ORAL TAKE AS DIRECTED
Qty: 2 TABLET | Refills: 0 | Status: SHIPPED | OUTPATIENT
Start: 2022-09-22 | End: 2022-10-20 | Stop reason: HOSPADM

## 2022-09-22 NOTE — PROGRESS NOTES
Subjective   Patient ID: Boni Hernandez is a 82 y.o. male is here today for follow-up to discuss myelogram.    Today patient states that his symptoms have worsened. Patient states that he has low back pain that radiates to the R leg. .     Patient, Provider, and MA are all wearing a mask in our office today.     History of Present Illness    This patient continues with fairly severe pain in his back with radiation down his right leg.  The pain is sharp and stabbing and quite severe.  It goes down his posterior lateral thigh posterior lateral calf but not so much into his foot.  Since I saw him in March the pain has gotten worse.    The following portions of the patient's history were reviewed and updated as appropriate: allergies, current medications, past family history, past medical history, past social history, past surgical history and problem list.    Review of Systems   Constitutional: Negative for chills and fever.   HENT: Negative for congestion.    Musculoskeletal: Positive for back pain, gait problem and myalgias.        R leg pain   Neurological: Positive for weakness and numbness.       I have reviewed the review of systems as documented by my MA.      Objective     There were no vitals filed for this visit.  There is no height or weight on file to calculate BMI.      Physical Exam  Neurological:      Mental Status: He is alert and oriented to person, place, and time.       Neurologic Exam     Mental Status   Oriented to person, place, and time.           Assessment & Plan   Independent Review of Radiographic Studies:      I personally reviewed the images from the following studies.    I again reviewed an MRI of his lumbar spine done in March of this year.  This does show fairly significant stenosis at L3-4.  He is fused at L4-5 and that looks okay.  There is a little foraminal stenosis at L5-S1 as well.    Medical Decision Making:      I recommended we go ahead with a lumbar myelogram.  I told the patient  what a myelogram involves.  I explained that there is a 50% chance of developing a bad headache and nausea as a result of the test.  I explained that there is also a very small chance of infection, seizures, and bleeding.  I explained how we would treat a post myelogram headache including bedrest, caffeinated fluids, steroids, and blood patch.  The patient does ask to proceed.  The patient's wife and daughter are with him today.    Diagnoses and all orders for this visit:    1. Spinal stenosis of lumbar region with neurogenic claudication (Primary)  -     IR Myelogram Lumbar Spine; Future  -     CT Lumbar Spine With Intrathecal Contrast; Future  -     XR Spine Lumbar Complete With Flex & Ext; Future  -     dexamethasone (DECADRON) 4 MG tablet; Take 2 tablets by mouth Take As Directed. Take both tablets by mouth 2 hours before myelogram  Dispense: 2 tablet; Refill: 0      Return for After radiology test.

## 2022-10-20 ENCOUNTER — HOSPITAL ENCOUNTER (OUTPATIENT)
Dept: GENERAL RADIOLOGY | Facility: HOSPITAL | Age: 82
Discharge: HOME | End: 2022-10-20

## 2022-10-20 ENCOUNTER — HOSPITAL ENCOUNTER (OUTPATIENT)
Dept: CT IMAGING | Facility: HOSPITAL | Age: 82
Discharge: HOME | End: 2022-10-20

## 2022-10-20 VITALS
OXYGEN SATURATION: 97 % | WEIGHT: 155 LBS | HEIGHT: 67 IN | DIASTOLIC BLOOD PRESSURE: 57 MMHG | HEART RATE: 69 BPM | SYSTOLIC BLOOD PRESSURE: 142 MMHG | RESPIRATION RATE: 18 BRPM | BODY MASS INDEX: 24.33 KG/M2 | TEMPERATURE: 98.6 F

## 2022-10-20 DIAGNOSIS — M48.062 SPINAL STENOSIS OF LUMBAR REGION WITH NEUROGENIC CLAUDICATION: ICD-10-CM

## 2022-10-20 DIAGNOSIS — M51.36 DDD (DEGENERATIVE DISC DISEASE), LUMBAR: ICD-10-CM

## 2022-10-20 PROCEDURE — 72132 CT LUMBAR SPINE W/DYE: CPT

## 2022-10-20 PROCEDURE — 62284 INJECTION FOR MYELOGRAM: CPT | Performed by: NEUROLOGICAL SURGERY

## 2022-10-20 PROCEDURE — A9270 NON-COVERED ITEM OR SERVICE: HCPCS | Performed by: NEUROLOGICAL SURGERY

## 2022-10-20 PROCEDURE — 0 IOPAMIDOL 41 % SOLUTION: Performed by: NEUROLOGICAL SURGERY

## 2022-10-20 PROCEDURE — 0 LIDOCAINE 1 % SOLUTION: Performed by: NEUROLOGICAL SURGERY

## 2022-10-20 PROCEDURE — 62304 MYELOGRAPHY LUMBAR INJECTION: CPT

## 2022-10-20 PROCEDURE — 72240 MYELOGRAPHY NECK SPINE: CPT

## 2022-10-20 PROCEDURE — 72114 X-RAY EXAM L-S SPINE BENDING: CPT

## 2022-10-20 PROCEDURE — 63710000001 HYDROCODONE-ACETAMINOPHEN 5-325 MG TABLET: Performed by: NEUROLOGICAL SURGERY

## 2022-10-20 RX ORDER — HYDROCODONE BITARTRATE AND ACETAMINOPHEN 5; 325 MG/1; MG/1
1 TABLET ORAL EVERY 4 HOURS PRN
Status: DISCONTINUED | OUTPATIENT
Start: 2022-10-20 | End: 2022-10-21 | Stop reason: HOSPADM

## 2022-10-20 RX ORDER — LIDOCAINE HYDROCHLORIDE 10 MG/ML
10 INJECTION, SOLUTION INFILTRATION; PERINEURAL ONCE
Status: COMPLETED | OUTPATIENT
Start: 2022-10-20 | End: 2022-10-20

## 2022-10-20 RX ORDER — ACETAMINOPHEN 325 MG/1
650 TABLET ORAL EVERY 4 HOURS PRN
Status: DISCONTINUED | OUTPATIENT
Start: 2022-10-20 | End: 2022-10-21 | Stop reason: HOSPADM

## 2022-10-20 RX ADMIN — LIDOCAINE HYDROCHLORIDE 5 ML: 10 INJECTION, SOLUTION INFILTRATION; PERINEURAL at 07:03

## 2022-10-20 RX ADMIN — HYDROCODONE BITARTRATE AND ACETAMINOPHEN 1 TABLET: 5; 325 TABLET ORAL at 08:04

## 2022-10-20 RX ADMIN — IOPAMIDOL 15 ML: 408 INJECTION, SOLUTION INTRATHECAL at 07:05

## 2022-10-20 NOTE — NURSING NOTE
Pt arrived in Radiology Triage For Myelogram.    Protective goggles and mask in place with all patient interactions today     Acute rehab

## 2022-10-20 NOTE — NURSING NOTE
Pt taken by wheelchair to patient discharge to meet his ride. Wife at his side.    Protective goggles and mask in place with all patient interactions today

## 2022-10-20 NOTE — DISCHARGE INSTRUCTIONS
EDUCATION /DISCHARGE INSTRUCTIONS:    A myelogram is a special radiology procedure of the spinal cord, spinal nerves and other related structures.  You will be awake during the examination.  An area of your lower back will be cleansed with an antiseptic solution.  The physician will inject a numbing medication in your lower back.  While your back is numb, a needle will be placed in the lower back area.  A small amount of spinal fluid may be withdrawn and sent to the lab if ordered by your physician. While the needle is in the back, an injection of a contrast material (xray dye) will be given through the needle.  The contrast material will allow the physician to see the spinal cord and spinal nerves.  Once injected, the needle will be removed and a band aid will be placed over the injection site.  The table will be tilted during the process to allow the contrast material to flow to particular areas in the spine.  Following the injection and xrays, you will be taken to the CT scanner where more pictures will be taken. After the procedure is finished, the contrast material will be absorbed by your body and eliminated through your kidneys.  The radiologist will study and interpret your myelogram and send the results to your physician.  Procedure risks of a myelogram include, but are not limited to:  *  Bleeding   *  Seizure  *  Infection   *  Headache, possibly severe requiring a blood patch  *  Contrast reaction  *  Nerve or cord injury  *  Paralysis and death  Benefits of the procedure:  *  Best examination for delineating pathology related to spinal cord compression from a disc and/or nerve root compression  Alternatives to the procedure:  MRI - a non invasive procedure requiring intravenous contrast injection.  Cannot be done on patients with certain pacemakers or metal in the body.  MRI risks include possible reaction to the contrast material, movement of metal located in the body.Benefit to MRI:  Non-invasive and  usually painless procedure.  THIS EDUCATION INFORMATION WAS REVIEWED PRIOR TO PROCEDURE AND CONSENT.   Patient initials____LR_____Time_____6:27am________  24 hour rest period ends _____10am Friday 10-21-22________.  Important information following your myelogram:  * ACTIVITY:   *  You may sit up in the car to go home.  *  When you get home, remain on bed rest (flat on your back or on your side) for 24 hours. You may place a rolled up towel under your neck for support  * You may get up to the bathroom and sit up to eat and drink then lie back down  * Drink additional fluids for 24 hours after the myelogram.   * Continue to drink additional fluids for the next 2-3 days. Water and caffeinated beverages are encouraged.  * Remain less active for the next two to three days.  * Do not drive for 24 hours following a myelogram.  * You may remove the bandage and shower in the morning.    *Resume taking blood thinner______PLAVIX___________ or aspirin today  CALL YOUR PHYSICIAN FOR THE FOLLOWING:  * Pain at the injection site  * Redness, swelling, bruising or drainage at the injection site.  * A fever by mouth of 101.0 or any new symptoms  Headaches are a common side effect after a myelogram.  If you get a headache, you should stay flat in bed and drink plenty of fluids. If the headache persists and does not go away with rest/medication,   CALL Dr. Alex at [phone]

## 2022-10-21 ENCOUNTER — TELEPHONE (OUTPATIENT)
Dept: INTERVENTIONAL RADIOLOGY/VASCULAR | Facility: HOSPITAL | Age: 82
End: 2022-10-21

## 2022-10-26 NOTE — PROGRESS NOTES
"Subjective   Patient ID: Boni Hernandez is a 82 y.o. male is here today for follow-up with a new Lumbar Myelogram done on 10/20/2022 for low back and leg pain.    Today patient's symptoms are worsening. Patient states that he has severe low back pain.     Patient, Provider, and MA are all wearing a mask in our office today.     History of Present Illness    This patient returns today.  He continues with pain in his back with radiation down his right leg.  Mostly stops in his calf.    The following portions of the patient's history were reviewed and updated as appropriate: allergies, current medications, past family history, past medical history, past social history, past surgical history and problem list.    Review of Systems   Constitutional: Negative for chills and fever.   HENT: Negative for congestion.    Genitourinary: Negative for difficulty urinating, dysuria and enuresis.   Musculoskeletal: Positive for back pain, gait problem and myalgias.   Neurological: Positive for weakness and numbness.       I have reviewed the review of systems as documented by my MA.      Objective     Vitals:    10/27/22 1145   BP: 135/84   Cuff Size: Adult   Pulse: 88   Temp: 98 °F (36.7 °C)   SpO2: 96%   Weight: 70.3 kg (155 lb)   Height: 170.2 cm (67\")     Body mass index is 24.28 kg/m².    Tobacco Use: Medium Risk   • Smoking Tobacco Use: Former   • Smokeless Tobacco Use: Never   • Passive Exposure: Not on file          Physical Exam  Neurological:      Mental Status: He is alert and oriented to person, place, and time.       Neurologic Exam     Mental Status   Oriented to person, place, and time.           Assessment & Plan   Independent Review of Radiographic Studies:      I personally reviewed the images from the following studies.    I reviewed his plain films, myelogram, and CT scan myself.  The plain films show previous surgery at L4-5 but no evidence of abnormal movement.  There does appear to be a solid fusion at that " level with good bone formation posterolaterally.  On the myelogram itself there is pretty severe narrowing at the L3-4 level just above the fusion.  The other levels mostly look okay including the operated level.  On the post myelographic CT scan the lower thoracic spine for the most part looks okay.  L1-2 looks okay.  L2-3 also looks okay.  L3-4 does show pretty severe lateral recess and central narrowing.  L4-5 is the operated level and looks okay.  L5-S1 does show some mild foraminal stenosis but not severe.    Medical Decision Making:      I told the patient and his family about the imaging.  I told him that from my point of view the only other option at this point is to proceed with surgery at L3-4.` I told the patient about the risks, complications and expected outcome of the lumbar surgery.  I explained that there was an 80% chance of getting rid of the pain in the leg.  I explained that there would still be back pain after the surgery.  Initially this will be quite severe but will improve over time.  There is a 2 or 3% chance of infection, bleeding, CSF leak, damage to the nerve as a result of surgery, paralysis, as well as anesthetic risk.  There is a 10% chance of recurrent problems.  There is a 10% chance of nonunion or failure of the instrumentation.  We discussed the postoperative hospital and home course.  The patient does ask to proceed.    He will need to be scheduled for a: Lumbar 3 to lumbar 4 laminectomy with fusion and instrumentation and possible removal of previous instrumentation at lumbar 4 to lumbar 5    Diagnoses and all orders for this visit:    1. Spinal stenosis of lumbar region with neurogenic claudication (Primary)      Return for 2-3 week post op.

## 2022-10-27 ENCOUNTER — PREP FOR SURGERY (OUTPATIENT)
Dept: OTHER | Facility: HOSPITAL | Age: 82
End: 2022-10-27

## 2022-10-27 ENCOUNTER — OFFICE VISIT (OUTPATIENT)
Dept: NEUROSURGERY | Facility: CLINIC | Age: 82
End: 2022-10-27

## 2022-10-27 VITALS
HEART RATE: 88 BPM | BODY MASS INDEX: 24.33 KG/M2 | TEMPERATURE: 98 F | HEIGHT: 67 IN | OXYGEN SATURATION: 96 % | SYSTOLIC BLOOD PRESSURE: 135 MMHG | WEIGHT: 155 LBS | DIASTOLIC BLOOD PRESSURE: 84 MMHG

## 2022-10-27 DIAGNOSIS — M48.062 SPINAL STENOSIS OF LUMBAR REGION WITH NEUROGENIC CLAUDICATION: Primary | ICD-10-CM

## 2022-10-27 PROCEDURE — 99214 OFFICE O/P EST MOD 30 MIN: CPT | Performed by: NEUROLOGICAL SURGERY

## 2022-10-27 RX ORDER — CEFAZOLIN SODIUM 2 G/100ML
2 INJECTION, SOLUTION INTRAVENOUS ONCE
Status: CANCELLED | OUTPATIENT
Start: 2022-11-23 | End: 2022-10-27

## 2022-11-10 ENCOUNTER — PRE-ADMISSION TESTING (OUTPATIENT)
Dept: PREADMISSION TESTING | Facility: HOSPITAL | Age: 82
End: 2022-11-10

## 2022-11-10 VITALS
WEIGHT: 152 LBS | BODY MASS INDEX: 24.43 KG/M2 | HEART RATE: 70 BPM | RESPIRATION RATE: 16 BRPM | HEIGHT: 66 IN | SYSTOLIC BLOOD PRESSURE: 137 MMHG | TEMPERATURE: 98.2 F | OXYGEN SATURATION: 99 % | DIASTOLIC BLOOD PRESSURE: 59 MMHG

## 2022-11-10 LAB
ANION GAP SERPL CALCULATED.3IONS-SCNC: 7.1 MMOL/L (ref 5–15)
BUN SERPL-MCNC: 13 MG/DL (ref 8–23)
BUN/CREAT SERPL: 11.3 (ref 7–25)
CALCIUM SPEC-SCNC: 9 MG/DL (ref 8.6–10.5)
CHLORIDE SERPL-SCNC: 96 MMOL/L (ref 98–107)
CO2 SERPL-SCNC: 25.9 MMOL/L (ref 22–29)
CREAT SERPL-MCNC: 1.15 MG/DL (ref 0.76–1.27)
DEPRECATED RDW RBC AUTO: 41.5 FL (ref 37–54)
EGFRCR SERPLBLD CKD-EPI 2021: 63.5 ML/MIN/1.73
ERYTHROCYTE [DISTWIDTH] IN BLOOD BY AUTOMATED COUNT: 13 % (ref 12.3–15.4)
GLUCOSE SERPL-MCNC: 110 MG/DL (ref 65–99)
HCT VFR BLD AUTO: 34.5 % (ref 37.5–51)
HGB BLD-MCNC: 11.4 G/DL (ref 13–17.7)
MCH RBC QN AUTO: 28.7 PG (ref 26.6–33)
MCHC RBC AUTO-ENTMCNC: 33 G/DL (ref 31.5–35.7)
MCV RBC AUTO: 86.9 FL (ref 79–97)
PLATELET # BLD AUTO: 246 10*3/MM3 (ref 140–450)
PMV BLD AUTO: 9.5 FL (ref 6–12)
POTASSIUM SERPL-SCNC: 4.6 MMOL/L (ref 3.5–5.2)
QT INTERVAL: 393 MS
RBC # BLD AUTO: 3.97 10*6/MM3 (ref 4.14–5.8)
SODIUM SERPL-SCNC: 129 MMOL/L (ref 136–145)
WBC NRBC COR # BLD: 5.12 10*3/MM3 (ref 3.4–10.8)

## 2022-11-10 PROCEDURE — 85027 COMPLETE CBC AUTOMATED: CPT

## 2022-11-10 PROCEDURE — 36415 COLL VENOUS BLD VENIPUNCTURE: CPT

## 2022-11-10 PROCEDURE — 80048 BASIC METABOLIC PNL TOTAL CA: CPT

## 2022-11-10 PROCEDURE — 93010 ELECTROCARDIOGRAM REPORT: CPT | Performed by: INTERNAL MEDICINE

## 2022-11-10 PROCEDURE — 93005 ELECTROCARDIOGRAM TRACING: CPT

## 2022-11-10 NOTE — DISCHARGE INSTRUCTIONS
Take the following medications the morning of surgery:      TAKE ISORSORBIDE,  PANTOPRAZOLE AND CARVEDILOL AM OF SURGEERY    TIME OF ARRIVAL 5:30    If you are on prescription narcotic pain medication to control your pain you may also take that medication the morning of surgery.    General Instructions:  Do not eat solid food after midnight the night before surgery.  You may drink clear liquids day of surgery but must stop at least one hour before your hospital arrival time.  It is beneficial for you to have a clear drink that contains carbohydrates the day of surgery.  We suggest a 12 to 20 ounce bottle of Gatorade or Powerade for non-diabetic patients or a 12 to 20 ounce bottle of G2 or Powerade Zero for diabetic patients. (Pediatric patients, are not advised to drink a 12 to 20 ounce carbohydrate drink)    Clear liquids are liquids you can see through.  Nothing red in color.     Plain water                               Sports drinks  Sodas                                   Gelatin (Jell-O)  Fruit juices without pulp such as white grape juice and apple juice  Popsicles that contain no fruit or yogurt  Tea or coffee (no cream or milk added)  Gatorade / Powerade  G2 / Powerade Zero       Patients who avoid smoking, chewing tobacco and alcohol for 4 weeks prior to surgery have a reduced risk of post-operative complications.  Quit smoking as many days before surgery as you can.  Do not smoke, use chewing tobacco or drink alcohol the day of surgery.   If applicable bring your C-PAP/ BI-PAP machine.  Bring any papers given to you in the doctor’s office.  Wear clean comfortable clothes.  Do not wear contact lenses, false eyelashes or make-up.  Bring a case for your glasses.   Bring crutches or walker if applicable.  Remove all piercings.  Leave jewelry and any other valuables at home.  Hair extensions with metal clips must be removed prior to surgery.  The Pre-Admission Testing nurse will instruct you to bring  medications if unable to obtain an accurate list in Pre-Admission Testing.            Preventing a Surgical Site Infection:  For 2 to 3 days before surgery, avoid shaving with a razor because the razor can irritate skin and make it easier to develop an infection.    Any areas of open skin can increase the risk of a post-operative wound infection by allowing bacteria to enter and travel throughout the body.  Notify your surgeon if you have any skin wounds / rashes even if it is not near the expected surgical site.  The area will need assessed to determine if surgery should be delayed until it is healed.  The night prior to surgery shower using a fresh bar of anti-bacterial soap (such as Dial) and clean washcloth.  Sleep in a clean bed with clean clothing.  Do not allow pets to sleep with you.  Shower on the morning of surgery using a fresh bar of anti-bacterial soap (such as Dial) and clean washcloth.  Dry with a clean towel and dress in clean clothing.  Ask your surgeon if you will be receiving antibiotics prior to surgery.  Make sure you, your family, and all healthcare providers clean their hands with soap and water or an alcohol based hand  before caring for you or your wound.    Day of surgery:  Your arrival time is approximately two hours before your scheduled surgery time.  Upon arrival, a Pre-op nurse and Anesthesiologist will review your health history, obtain vital signs, and answer questions you may have.  The only belongings needed at this time will be a list of your home medications and if applicable your C-PAP/BI-PAP machine.  A Pre-op nurse will start an IV and you may receive medication in preparation for surgery, including something to help you relax.     Please be aware that surgery does come with discomfort.  We want to make every effort to control your discomfort so please discuss any uncontrolled symptoms with your nurse.   Your doctor will most likely have prescribed pain medications.       If you are going home after surgery you will receive individualized written care instructions before being discharged.  A responsible adult must drive you to and from the hospital on the day of your surgery and stay with you for 24 hours.  Discharge prescriptions can be filled by the hospital pharmacy during regular pharmacy hours.  If you are having surgery late in the day/evening your prescription may be e-prescribed to your pharmacy.  Please verify your pharmacy hours or chose a 24 hour pharmacy to avoid not having access to your prescription because your pharmacy has closed for the day.    If you are staying overnight following surgery, you will be transported to your hospital room following the recovery period.  [facility] has all private rooms.    If you have any questions please call Pre-Admission Testing at [phone].  Deductibles and co-payments are collected on the day of service. Please be prepared to pay the required co-pay, deductible or deposit on the day of service as defined by your plan.    Call your surgeon immediately if you experience any of the following symptoms:  Sore Throat  Shortness of Breath or difficulty breathing  Cough  Chills  Body soreness or muscle pain  Headache  Fever  New loss of taste or smell  Do not arrive for your surgery ill.  Your procedure will need to be rescheduled to another time.  You will need to call your physician before the day of surgery to avoid any unnecessary exposure to hospital staff as well as other patients.       CHLORHEXIDINE CLOTH INSTRUCTIONS  The morning of surgery follow these instructions using the Chlorhexidine cloths you've been given.  These steps reduce bacteria on the body.  Do not use the cloths near your eyes, ears mouth, genitalia or on open wounds.  Throw the cloths away after use but do not try to flush them down a toilet.      Open and remove one cloth at a time from the package.    Leave the cloth unfolded and begin  the bathing.  Massage the skin with the cloths using gentle pressure to remove bacteria.  Do not scrub harshly.   Follow the steps below with one 2% CHG cloth per area (6 total cloths).  One cloth for neck, shoulders and chest.  One cloth for both arms, hands, fingers and underarms (do underarms last).  One cloth for the abdomen followed by groin.  One cloth for right leg and foot including between the toes.  One cloth for left leg and foot including between the toes.  The last cloth is to be used for the back of the neck, back and buttocks.    Allow the CHG to air dry 3 minutes on the skin which will give it time to work and decrease the chance of irritation.  The skin may feel sticky until it is dry.  Do not rinse with water or any other liquid or you will lose the beneficial effects of the CHG.  If mild skin irritation occurs, do rinse the skin to remove the CHG.  Report this to the nurse at time of admission.  Do not apply lotions, creams, ointments, deodorants or perfumes after using the clothes. Dress in clean clothes before coming to the hospital.

## 2022-11-23 ENCOUNTER — ANESTHESIA (OUTPATIENT)
Dept: PERIOP | Facility: HOSPITAL | Age: 82
End: 2022-11-23

## 2022-11-23 ENCOUNTER — HOSPITAL ENCOUNTER (INPATIENT)
Facility: HOSPITAL | Age: 82
LOS: 14 days | Discharge: NURSING, REHAB OR LONG-TERM CARE | End: 2022-12-13
Attending: NEUROLOGICAL SURGERY | Admitting: NEUROLOGICAL SURGERY

## 2022-11-23 ENCOUNTER — ANESTHESIA EVENT (OUTPATIENT)
Dept: PERIOP | Facility: HOSPITAL | Age: 82
End: 2022-11-23

## 2022-11-23 ENCOUNTER — APPOINTMENT (OUTPATIENT)
Dept: GENERAL RADIOLOGY | Facility: HOSPITAL | Age: 82
End: 2022-11-23

## 2022-11-23 DIAGNOSIS — M48.062 SPINAL STENOSIS OF LUMBAR REGION WITH NEUROGENIC CLAUDICATION: ICD-10-CM

## 2022-11-23 PROCEDURE — 72100 X-RAY EXAM L-S SPINE 2/3 VWS: CPT

## 2022-11-23 PROCEDURE — C1713 ANCHOR/SCREW BN/BN,TIS/BN: HCPCS | Performed by: NEUROLOGICAL SURGERY

## 2022-11-23 PROCEDURE — 25010000002 ONDANSETRON PER 1 MG: Performed by: NURSE ANESTHETIST, CERTIFIED REGISTERED

## 2022-11-23 PROCEDURE — 25010000002 EPINEPHRINE PER 0.1 MG: Performed by: NEUROLOGICAL SURGERY

## 2022-11-23 PROCEDURE — 22630 ARTHRD PST TQ 1NTRSPC LUM: CPT | Performed by: NEUROLOGICAL SURGERY

## 2022-11-23 PROCEDURE — 22830 EXPLORATION OF SPINAL FUSION: CPT | Performed by: SPECIALIST/TECHNOLOGIST, OTHER

## 2022-11-23 PROCEDURE — 22630 ARTHRD PST TQ 1NTRSPC LUM: CPT | Performed by: SPECIALIST/TECHNOLOGIST, OTHER

## 2022-11-23 PROCEDURE — 63710000001 CARVEDILOL 6.25 MG TABLET: Performed by: NEUROLOGICAL SURGERY

## 2022-11-23 PROCEDURE — 22853 INSJ BIOMECHANICAL DEVICE: CPT | Performed by: NEUROLOGICAL SURGERY

## 2022-11-23 PROCEDURE — 63710000001 ACETAMINOPHEN 500 MG TABLET: Performed by: ANESTHESIOLOGY

## 2022-11-23 PROCEDURE — 25010000002 HYDROMORPHONE 1 MG/ML SOLUTION: Performed by: NURSE ANESTHETIST, CERTIFIED REGISTERED

## 2022-11-23 PROCEDURE — 63052 LAM FACETC/FRMT ARTHRD LUM 1: CPT | Performed by: SPECIALIST/TECHNOLOGIST, OTHER

## 2022-11-23 PROCEDURE — A9270 NON-COVERED ITEM OR SERVICE: HCPCS | Performed by: NURSE ANESTHETIST, CERTIFIED REGISTERED

## 2022-11-23 PROCEDURE — 25010000002 DEXAMETHASONE SODIUM PHOSPHATE 20 MG/5ML SOLUTION: Performed by: NURSE ANESTHETIST, CERTIFIED REGISTERED

## 2022-11-23 PROCEDURE — 22853 INSJ BIOMECHANICAL DEVICE: CPT | Performed by: SPECIALIST/TECHNOLOGIST, OTHER

## 2022-11-23 PROCEDURE — 61783 SCAN PROC SPINAL: CPT | Performed by: NEUROLOGICAL SURGERY

## 2022-11-23 PROCEDURE — 25010000002 PROPOFOL 10 MG/ML EMULSION: Performed by: NURSE ANESTHETIST, CERTIFIED REGISTERED

## 2022-11-23 PROCEDURE — 25010000002 FENTANYL CITRATE (PF) 50 MCG/ML SOLUTION: Performed by: NURSE ANESTHETIST, CERTIFIED REGISTERED

## 2022-11-23 PROCEDURE — 76000 FLUOROSCOPY <1 HR PHYS/QHP: CPT

## 2022-11-23 PROCEDURE — A9270 NON-COVERED ITEM OR SERVICE: HCPCS | Performed by: NEUROLOGICAL SURGERY

## 2022-11-23 PROCEDURE — 63052 LAM FACETC/FRMT ARTHRD LUM 1: CPT | Performed by: NEUROLOGICAL SURGERY

## 2022-11-23 PROCEDURE — 25010000002 MORPHINE PER 10 MG: Performed by: NEUROLOGICAL SURGERY

## 2022-11-23 PROCEDURE — 63710000001 MULTIVITAMIN WITH MINERALS TABLET: Performed by: NEUROLOGICAL SURGERY

## 2022-11-23 PROCEDURE — 25010000002 SODIUM CHLORIDE 0.9 % WITH KCL 20 MEQ 20-0.9 MEQ/L-% SOLUTION: Performed by: NEUROLOGICAL SURGERY

## 2022-11-23 PROCEDURE — 20939 BONE MARROW ASPIR BONE GRFG: CPT | Performed by: NEUROLOGICAL SURGERY

## 2022-11-23 PROCEDURE — 20939 BONE MARROW ASPIR BONE GRFG: CPT | Performed by: SPECIALIST/TECHNOLOGIST, OTHER

## 2022-11-23 PROCEDURE — 25010000002 MIDAZOLAM PER 1 MG: Performed by: ANESTHESIOLOGY

## 2022-11-23 PROCEDURE — 25010000002 NEOSTIGMINE 5 MG/10ML SOLUTION: Performed by: NURSE ANESTHETIST, CERTIFIED REGISTERED

## 2022-11-23 PROCEDURE — 63710000001 ISOSORBIDE MONONITRATE 60 MG TABLET SUSTAINED-RELEASE 24 HOUR: Performed by: NEUROLOGICAL SURGERY

## 2022-11-23 PROCEDURE — 25010000002 CEFAZOLIN IN DEXTROSE 2-4 GM/100ML-% SOLUTION: Performed by: NEUROLOGICAL SURGERY

## 2022-11-23 PROCEDURE — 63710000001 LISINOPRIL 10 MG TABLET: Performed by: NEUROLOGICAL SURGERY

## 2022-11-23 PROCEDURE — 25010000002 VANCOMYCIN 1 G RECONSTITUTED SOLUTION 1 EACH VIAL: Performed by: NEUROLOGICAL SURGERY

## 2022-11-23 PROCEDURE — 22830 EXPLORATION OF SPINAL FUSION: CPT | Performed by: NEUROLOGICAL SURGERY

## 2022-11-23 PROCEDURE — 63710000001 PANTOPRAZOLE 40 MG TABLET DELAYED-RELEASE: Performed by: NEUROLOGICAL SURGERY

## 2022-11-23 PROCEDURE — 63710000001 ATORVASTATIN 20 MG TABLET: Performed by: NEUROLOGICAL SURGERY

## 2022-11-23 PROCEDURE — 22840 INSERT SPINE FIXATION DEVICE: CPT | Performed by: NEUROLOGICAL SURGERY

## 2022-11-23 PROCEDURE — A9270 NON-COVERED ITEM OR SERVICE: HCPCS | Performed by: ANESTHESIOLOGY

## 2022-11-23 PROCEDURE — 63710000001 HYDROCODONE-ACETAMINOPHEN 7.5-325 MG TABLET: Performed by: NURSE ANESTHETIST, CERTIFIED REGISTERED

## 2022-11-23 PROCEDURE — 22840 INSERT SPINE FIXATION DEVICE: CPT | Performed by: SPECIALIST/TECHNOLOGIST, OTHER

## 2022-11-23 DEVICE — FLOSEAL HEMOSTATIC MATRIX, 5ML
Type: IMPLANTABLE DEVICE | Site: SPINE LUMBAR | Status: FUNCTIONAL
Brand: FLOSEAL HEMOSTATIC MATRIX

## 2022-11-23 DEVICE — ALLOGRFT DBM GRAFTON DS INJ FIBR 6CC: Type: IMPLANTABLE DEVICE | Site: SPINE LUMBAR | Status: FUNCTIONAL

## 2022-11-23 DEVICE — SPACER 6068073 CATALYFT PL SHORT 7MM
Type: IMPLANTABLE DEVICE | Site: SPINE LUMBAR | Status: FUNCTIONAL
Brand: CATALYFT PL EXPANDABLE INTERBODY SYSTEM

## 2022-11-23 DEVICE — SSC BONE WAX
Type: IMPLANTABLE DEVICE | Site: SPINE LUMBAR | Status: FUNCTIONAL
Brand: SSC BONE WAX

## 2022-11-23 RX ORDER — SODIUM CHLORIDE 0.9 % (FLUSH) 0.9 %
10 SYRINGE (ML) INJECTION AS NEEDED
Status: DISCONTINUED | OUTPATIENT
Start: 2022-11-23 | End: 2022-12-13 | Stop reason: HOSPADM

## 2022-11-23 RX ORDER — MIDAZOLAM HYDROCHLORIDE 1 MG/ML
0.5 INJECTION INTRAMUSCULAR; INTRAVENOUS
Status: DISCONTINUED | OUTPATIENT
Start: 2022-11-23 | End: 2022-11-23 | Stop reason: HOSPADM

## 2022-11-23 RX ORDER — ONDANSETRON 2 MG/ML
4 INJECTION INTRAMUSCULAR; INTRAVENOUS ONCE AS NEEDED
Status: DISCONTINUED | OUTPATIENT
Start: 2022-11-23 | End: 2022-11-23 | Stop reason: HOSPADM

## 2022-11-23 RX ORDER — NALOXONE HCL 0.4 MG/ML
0.4 VIAL (ML) INJECTION
Status: DISCONTINUED | OUTPATIENT
Start: 2022-11-23 | End: 2022-11-26

## 2022-11-23 RX ORDER — SODIUM CHLORIDE 0.9 % (FLUSH) 0.9 %
3 SYRINGE (ML) INJECTION EVERY 12 HOURS SCHEDULED
Status: DISCONTINUED | OUTPATIENT
Start: 2022-11-23 | End: 2022-12-13 | Stop reason: HOSPADM

## 2022-11-23 RX ORDER — ONDANSETRON 2 MG/ML
INJECTION INTRAMUSCULAR; INTRAVENOUS AS NEEDED
Status: DISCONTINUED | OUTPATIENT
Start: 2022-11-23 | End: 2022-11-23 | Stop reason: SURG

## 2022-11-23 RX ORDER — NALOXONE HCL 0.4 MG/ML
0.2 VIAL (ML) INJECTION AS NEEDED
Status: DISCONTINUED | OUTPATIENT
Start: 2022-11-23 | End: 2022-11-23 | Stop reason: HOSPADM

## 2022-11-23 RX ORDER — MORPHINE SULFATE 2 MG/ML
2 INJECTION, SOLUTION INTRAMUSCULAR; INTRAVENOUS EVERY 4 HOURS PRN
Status: DISCONTINUED | OUTPATIENT
Start: 2022-11-23 | End: 2022-11-26

## 2022-11-23 RX ORDER — GLYCOPYRROLATE 0.2 MG/ML
INJECTION INTRAMUSCULAR; INTRAVENOUS AS NEEDED
Status: DISCONTINUED | OUTPATIENT
Start: 2022-11-23 | End: 2022-11-23 | Stop reason: SURG

## 2022-11-23 RX ORDER — CARVEDILOL 6.25 MG/1
6.25 TABLET ORAL 2 TIMES DAILY WITH MEALS
Status: DISCONTINUED | OUTPATIENT
Start: 2022-11-23 | End: 2022-11-28

## 2022-11-23 RX ORDER — HYDROCODONE BITARTRATE AND ACETAMINOPHEN 7.5; 325 MG/1; MG/1
1 TABLET ORAL ONCE AS NEEDED
Status: COMPLETED | OUTPATIENT
Start: 2022-11-23 | End: 2022-11-23

## 2022-11-23 RX ORDER — ACETAMINOPHEN 500 MG
1000 TABLET ORAL EVERY 6 HOURS PRN
Status: DISCONTINUED | OUTPATIENT
Start: 2022-11-23 | End: 2022-12-13 | Stop reason: HOSPADM

## 2022-11-23 RX ORDER — DIPHENHYDRAMINE HCL 25 MG
25 CAPSULE ORAL
Status: DISCONTINUED | OUTPATIENT
Start: 2022-11-23 | End: 2022-11-23 | Stop reason: HOSPADM

## 2022-11-23 RX ORDER — ACETAMINOPHEN 500 MG
1000 TABLET ORAL ONCE
Status: COMPLETED | OUTPATIENT
Start: 2022-11-23 | End: 2022-11-23

## 2022-11-23 RX ORDER — HYDRALAZINE HYDROCHLORIDE 20 MG/ML
5 INJECTION INTRAMUSCULAR; INTRAVENOUS
Status: DISCONTINUED | OUTPATIENT
Start: 2022-11-23 | End: 2022-11-23 | Stop reason: HOSPADM

## 2022-11-23 RX ORDER — ISOSORBIDE MONONITRATE 60 MG/1
60 TABLET, EXTENDED RELEASE ORAL DAILY
Status: DISCONTINUED | OUTPATIENT
Start: 2022-11-23 | End: 2022-11-28

## 2022-11-23 RX ORDER — PANTOPRAZOLE SODIUM 40 MG/1
40 TABLET, DELAYED RELEASE ORAL DAILY
Status: DISCONTINUED | OUTPATIENT
Start: 2022-11-23 | End: 2022-11-28

## 2022-11-23 RX ORDER — MULTIPLE VITAMINS W/ MINERALS TAB 9MG-400MCG
1 TAB ORAL DAILY
Status: DISCONTINUED | OUTPATIENT
Start: 2022-11-23 | End: 2022-12-13 | Stop reason: HOSPADM

## 2022-11-23 RX ORDER — SODIUM CHLORIDE 0.9 % (FLUSH) 0.9 %
3-10 SYRINGE (ML) INJECTION AS NEEDED
Status: DISCONTINUED | OUTPATIENT
Start: 2022-11-23 | End: 2022-11-23 | Stop reason: HOSPADM

## 2022-11-23 RX ORDER — SODIUM CHLORIDE 9 MG/ML
INJECTION, SOLUTION INTRAVENOUS AS NEEDED
Status: DISCONTINUED | OUTPATIENT
Start: 2022-11-23 | End: 2022-11-23 | Stop reason: HOSPADM

## 2022-11-23 RX ORDER — FLUMAZENIL 0.1 MG/ML
0.2 INJECTION INTRAVENOUS AS NEEDED
Status: DISCONTINUED | OUTPATIENT
Start: 2022-11-23 | End: 2022-11-23 | Stop reason: HOSPADM

## 2022-11-23 RX ORDER — PROMETHAZINE HYDROCHLORIDE 25 MG/1
25 TABLET ORAL ONCE AS NEEDED
Status: DISCONTINUED | OUTPATIENT
Start: 2022-11-23 | End: 2022-11-23 | Stop reason: HOSPADM

## 2022-11-23 RX ORDER — LABETALOL HYDROCHLORIDE 5 MG/ML
INJECTION, SOLUTION INTRAVENOUS AS NEEDED
Status: DISCONTINUED | OUTPATIENT
Start: 2022-11-23 | End: 2022-11-23 | Stop reason: SURG

## 2022-11-23 RX ORDER — LISINOPRIL 10 MG/1
10 TABLET ORAL NIGHTLY
Status: DISCONTINUED | OUTPATIENT
Start: 2022-11-23 | End: 2022-11-27

## 2022-11-23 RX ORDER — EPHEDRINE SULFATE 50 MG/ML
5 INJECTION, SOLUTION INTRAVENOUS ONCE AS NEEDED
Status: DISCONTINUED | OUTPATIENT
Start: 2022-11-23 | End: 2022-11-23 | Stop reason: HOSPADM

## 2022-11-23 RX ORDER — HYDROMORPHONE HYDROCHLORIDE 1 MG/ML
0.5 INJECTION, SOLUTION INTRAMUSCULAR; INTRAVENOUS; SUBCUTANEOUS
Status: DISCONTINUED | OUTPATIENT
Start: 2022-11-23 | End: 2022-11-23 | Stop reason: HOSPADM

## 2022-11-23 RX ORDER — SODIUM CHLORIDE 9 MG/ML
40 INJECTION, SOLUTION INTRAVENOUS AS NEEDED
Status: DISCONTINUED | OUTPATIENT
Start: 2022-11-23 | End: 2022-12-13 | Stop reason: HOSPADM

## 2022-11-23 RX ORDER — ONDANSETRON 2 MG/ML
4 INJECTION INTRAMUSCULAR; INTRAVENOUS EVERY 6 HOURS PRN
Status: DISCONTINUED | OUTPATIENT
Start: 2022-11-23 | End: 2022-12-13 | Stop reason: HOSPADM

## 2022-11-23 RX ORDER — LIDOCAINE HYDROCHLORIDE 20 MG/ML
INJECTION, SOLUTION EPIDURAL; INFILTRATION; INTRACAUDAL; PERINEURAL AS NEEDED
Status: DISCONTINUED | OUTPATIENT
Start: 2022-11-23 | End: 2022-11-23 | Stop reason: SURG

## 2022-11-23 RX ORDER — NITROGLYCERIN 0.4 MG/1
0.4 TABLET SUBLINGUAL
Status: DISCONTINUED | OUTPATIENT
Start: 2022-11-23 | End: 2022-12-13 | Stop reason: HOSPADM

## 2022-11-23 RX ORDER — DIPHENHYDRAMINE HYDROCHLORIDE 50 MG/ML
12.5 INJECTION INTRAMUSCULAR; INTRAVENOUS
Status: DISCONTINUED | OUTPATIENT
Start: 2022-11-23 | End: 2022-11-23 | Stop reason: HOSPADM

## 2022-11-23 RX ORDER — DEXAMETHASONE SODIUM PHOSPHATE 4 MG/ML
INJECTION, SOLUTION INTRA-ARTICULAR; INTRALESIONAL; INTRAMUSCULAR; INTRAVENOUS; SOFT TISSUE AS NEEDED
Status: DISCONTINUED | OUTPATIENT
Start: 2022-11-23 | End: 2022-11-23 | Stop reason: SURG

## 2022-11-23 RX ORDER — FAMOTIDINE 10 MG/ML
20 INJECTION, SOLUTION INTRAVENOUS ONCE
Status: COMPLETED | OUTPATIENT
Start: 2022-11-23 | End: 2022-11-23

## 2022-11-23 RX ORDER — SODIUM CHLORIDE 0.9 % (FLUSH) 0.9 %
3 SYRINGE (ML) INJECTION EVERY 12 HOURS SCHEDULED
Status: DISCONTINUED | OUTPATIENT
Start: 2022-11-23 | End: 2022-11-23 | Stop reason: HOSPADM

## 2022-11-23 RX ORDER — LABETALOL HYDROCHLORIDE 5 MG/ML
5 INJECTION, SOLUTION INTRAVENOUS
Status: DISCONTINUED | OUTPATIENT
Start: 2022-11-23 | End: 2022-11-23 | Stop reason: HOSPADM

## 2022-11-23 RX ORDER — ONDANSETRON 4 MG/1
4 TABLET, FILM COATED ORAL EVERY 6 HOURS PRN
Status: DISCONTINUED | OUTPATIENT
Start: 2022-11-23 | End: 2022-12-13 | Stop reason: HOSPADM

## 2022-11-23 RX ORDER — SODIUM CHLORIDE, SODIUM LACTATE, POTASSIUM CHLORIDE, CALCIUM CHLORIDE 600; 310; 30; 20 MG/100ML; MG/100ML; MG/100ML; MG/100ML
9 INJECTION, SOLUTION INTRAVENOUS CONTINUOUS
Status: DISCONTINUED | OUTPATIENT
Start: 2022-11-23 | End: 2022-11-25

## 2022-11-23 RX ORDER — PROMETHAZINE HYDROCHLORIDE 25 MG/1
25 SUPPOSITORY RECTAL ONCE AS NEEDED
Status: DISCONTINUED | OUTPATIENT
Start: 2022-11-23 | End: 2022-11-23 | Stop reason: HOSPADM

## 2022-11-23 RX ORDER — FENTANYL CITRATE 50 UG/ML
50 INJECTION, SOLUTION INTRAMUSCULAR; INTRAVENOUS
Status: DISCONTINUED | OUTPATIENT
Start: 2022-11-23 | End: 2022-11-23 | Stop reason: HOSPADM

## 2022-11-23 RX ORDER — CEFAZOLIN SODIUM 2 G/100ML
2 INJECTION, SOLUTION INTRAVENOUS ONCE
Status: COMPLETED | OUTPATIENT
Start: 2022-11-23 | End: 2022-11-23

## 2022-11-23 RX ORDER — ATORVASTATIN CALCIUM 80 MG/1
80 TABLET, FILM COATED ORAL NIGHTLY
Status: DISCONTINUED | OUTPATIENT
Start: 2022-11-23 | End: 2022-11-30

## 2022-11-23 RX ORDER — HYDROCODONE BITARTRATE AND ACETAMINOPHEN 5; 325 MG/1; MG/1
1 TABLET ORAL EVERY 4 HOURS PRN
Status: DISCONTINUED | OUTPATIENT
Start: 2022-11-23 | End: 2022-11-27

## 2022-11-23 RX ORDER — ROCURONIUM BROMIDE 10 MG/ML
INJECTION, SOLUTION INTRAVENOUS AS NEEDED
Status: DISCONTINUED | OUTPATIENT
Start: 2022-11-23 | End: 2022-11-23 | Stop reason: SURG

## 2022-11-23 RX ORDER — NEOSTIGMINE METHYLSULFATE 0.5 MG/ML
INJECTION, SOLUTION INTRAVENOUS AS NEEDED
Status: DISCONTINUED | OUTPATIENT
Start: 2022-11-23 | End: 2022-11-23 | Stop reason: SURG

## 2022-11-23 RX ORDER — OXYCODONE AND ACETAMINOPHEN 7.5; 325 MG/1; MG/1
1 TABLET ORAL EVERY 4 HOURS PRN
Status: DISCONTINUED | OUTPATIENT
Start: 2022-11-23 | End: 2022-11-23 | Stop reason: HOSPADM

## 2022-11-23 RX ORDER — FENTANYL CITRATE 50 UG/ML
INJECTION, SOLUTION INTRAMUSCULAR; INTRAVENOUS AS NEEDED
Status: DISCONTINUED | OUTPATIENT
Start: 2022-11-23 | End: 2022-11-23 | Stop reason: SURG

## 2022-11-23 RX ORDER — PROPOFOL 10 MG/ML
VIAL (ML) INTRAVENOUS AS NEEDED
Status: DISCONTINUED | OUTPATIENT
Start: 2022-11-23 | End: 2022-11-23 | Stop reason: SURG

## 2022-11-23 RX ORDER — SODIUM CHLORIDE AND POTASSIUM CHLORIDE 150; 900 MG/100ML; MG/100ML
75 INJECTION, SOLUTION INTRAVENOUS CONTINUOUS
Status: DISCONTINUED | OUTPATIENT
Start: 2022-11-23 | End: 2022-11-26

## 2022-11-23 RX ADMIN — PROPOFOL 50 MG: 10 INJECTION, EMULSION INTRAVENOUS at 09:54

## 2022-11-23 RX ADMIN — LIDOCAINE HYDROCHLORIDE 60 MG: 20 INJECTION, SOLUTION EPIDURAL; INFILTRATION; INTRACAUDAL; PERINEURAL at 09:30

## 2022-11-23 RX ADMIN — FENTANYL CITRATE 25 MCG: 50 INJECTION, SOLUTION INTRAMUSCULAR; INTRAVENOUS at 09:51

## 2022-11-23 RX ADMIN — GLYCOPYRROLATE 0.1 MG: 1 INJECTION INTRAMUSCULAR; INTRAVENOUS at 09:30

## 2022-11-23 RX ADMIN — PANTOPRAZOLE SODIUM 40 MG: 40 TABLET, DELAYED RELEASE ORAL at 16:13

## 2022-11-23 RX ADMIN — LABETALOL HYDROCHLORIDE 5 MG: 5 INJECTION, SOLUTION INTRAVENOUS at 12:24

## 2022-11-23 RX ADMIN — FENTANYL CITRATE 25 MCG: 50 INJECTION, SOLUTION INTRAMUSCULAR; INTRAVENOUS at 09:30

## 2022-11-23 RX ADMIN — MIDAZOLAM 0.5 MG: 1 INJECTION INTRAMUSCULAR; INTRAVENOUS at 08:36

## 2022-11-23 RX ADMIN — ACETAMINOPHEN 1000 MG: 500 TABLET ORAL at 08:20

## 2022-11-23 RX ADMIN — CEFAZOLIN SODIUM 2 G: 2 INJECTION, SOLUTION INTRAVENOUS at 09:21

## 2022-11-23 RX ADMIN — ROCURONIUM BROMIDE 10 MG: 10 INJECTION, SOLUTION INTRAVENOUS at 10:47

## 2022-11-23 RX ADMIN — MORPHINE SULFATE 2 MG: 2 INJECTION, SOLUTION INTRAMUSCULAR; INTRAVENOUS at 16:09

## 2022-11-23 RX ADMIN — GLYCOPYRROLATE 0.4 MG: 1 INJECTION INTRAMUSCULAR; INTRAVENOUS at 11:53

## 2022-11-23 RX ADMIN — MULTIPLE VITAMINS W/ MINERALS TAB 1 TABLET: TAB at 16:13

## 2022-11-23 RX ADMIN — ROCURONIUM BROMIDE 50 MG: 10 INJECTION, SOLUTION INTRAVENOUS at 09:30

## 2022-11-23 RX ADMIN — CARVEDILOL 6.25 MG: 6.25 TABLET, FILM COATED ORAL at 18:08

## 2022-11-23 RX ADMIN — FENTANYL CITRATE 50 MCG: 50 INJECTION, SOLUTION INTRAMUSCULAR; INTRAVENOUS at 10:57

## 2022-11-23 RX ADMIN — PROPOFOL 150 MG: 10 INJECTION, EMULSION INTRAVENOUS at 09:30

## 2022-11-23 RX ADMIN — NEOSTIGMINE METHYLSULFATE 3 MG: 0.5 INJECTION INTRAVENOUS at 11:53

## 2022-11-23 RX ADMIN — FENTANYL CITRATE 50 MCG: 0.05 INJECTION, SOLUTION INTRAMUSCULAR; INTRAVENOUS at 12:36

## 2022-11-23 RX ADMIN — ATORVASTATIN CALCIUM 80 MG: 20 TABLET, FILM COATED ORAL at 22:44

## 2022-11-23 RX ADMIN — HYDROMORPHONE HYDROCHLORIDE 0.5 MG: 1 INJECTION, SOLUTION INTRAMUSCULAR; INTRAVENOUS; SUBCUTANEOUS at 12:03

## 2022-11-23 RX ADMIN — FAMOTIDINE 20 MG: 10 INJECTION INTRAVENOUS at 08:20

## 2022-11-23 RX ADMIN — HYDROCODONE BITARTRATE AND ACETAMINOPHEN 1 TABLET: 7.5; 325 TABLET ORAL at 13:22

## 2022-11-23 RX ADMIN — ONDANSETRON 4 MG: 2 INJECTION INTRAMUSCULAR; INTRAVENOUS at 11:53

## 2022-11-23 RX ADMIN — Medication 3 ML: at 22:44

## 2022-11-23 RX ADMIN — DEXAMETHASONE SODIUM PHOSPHATE 10 MG: 4 INJECTION, SOLUTION INTRAMUSCULAR; INTRAVENOUS at 09:45

## 2022-11-23 RX ADMIN — LISINOPRIL 10 MG: 10 TABLET ORAL at 22:44

## 2022-11-23 RX ADMIN — SODIUM CHLORIDE, POTASSIUM CHLORIDE, SODIUM LACTATE AND CALCIUM CHLORIDE 9 ML/HR: 600; 310; 30; 20 INJECTION, SOLUTION INTRAVENOUS at 08:20

## 2022-11-23 RX ADMIN — POTASSIUM CHLORIDE AND SODIUM CHLORIDE 100 ML/HR: 900; 150 INJECTION, SOLUTION INTRAVENOUS at 16:08

## 2022-11-23 RX ADMIN — ISOSORBIDE MONONITRATE 60 MG: 60 TABLET ORAL at 16:13

## 2022-11-23 NOTE — ANESTHESIA PREPROCEDURE EVALUATION
Anesthesia Evaluation     no history of anesthetic complications:  NPO Solid Status: > 8 hours  NPO Liquid Status: > 2 hours           Airway   Mallampati: II  Neck ROM: full  no difficulty expected  Dental    (+) upper dentures and lower dentures    Pulmonary - normal exam   (+) a smoker Former,   (-) COPD, asthma, sleep apnea    PE comment: nonlabored  Cardiovascular - normal exam  Exercise tolerance: poor (<4 METS)    ECG reviewed  Rhythm: regular  Rate: normal    (+) hypertension, past MI (10/2019) , CAD, cardiac stents (10/2019) hyperlipidemia,   (-) valvular problems/murmurs, dysrhythmias, angina      Neuro/Psych  (+) weakness (B LEs),    (-) seizures, TIA, CVA  GI/Hepatic/Renal/Endo    (+)  GERD,    (-) liver disease, no renal disease, diabetes, no thyroid disorder    Musculoskeletal     (+) arthralgias, back pain,   Abdominal    Substance History      OB/GYN          Other   arthritis,                      Anesthesia Plan    ASA 3     general     intravenous induction     Anesthetic plan, risks, benefits, and alternatives have been provided, discussed and informed consent has been obtained with: patient.        CODE STATUS:

## 2022-11-23 NOTE — ANESTHESIA POSTPROCEDURE EVALUATION
"Patient: Boni Hernandez    Procedure Summary     Date: 11/23/22 Room / Location: [facility] OR [address] [place] [address] MAIN OR    Anesthesia Start: 0925 Anesthesia Stop: 1229    Procedure: Lumbar 3 to lumbar 4 laminectomy with fusion and instrumentation and removal of previous instrumentation at lumbar 4 to lumbar 5 (Spine Lumbar) Diagnosis:       Spinal stenosis of lumbar region with neurogenic claudication      (Spinal stenosis of lumbar region with neurogenic claudication [M48.062])    Surgeons: Matthew Alex MD Provider: Darrell London MD    Anesthesia Type: general ASA Status: 3          Anesthesia Type: general    Vitals  Vitals Value Taken Time   /69 11/23/22 1311   Temp     Pulse 65 11/23/22 1321   Resp     SpO2 100 % 11/23/22 1321   Vitals shown include unvalidated device data.        Post Anesthesia Care and Evaluation    Patient location during evaluation: PACU  Patient participation: complete - patient participated  Level of consciousness: awake and alert  Pain management: adequate    Airway patency: patent  Anesthetic complications: No anesthetic complications    Cardiovascular status: acceptable  Respiratory status: acceptable  Hydration status: acceptable    Comments: /69 (BP Location: Right arm, Patient Position: Sitting)   Pulse 72   Temp 36.8 °C (98.2 °F) (Oral)   Resp 18   Ht 170.2 cm (67\")   Wt 68 kg (150 lb)   SpO2 97%   BMI 23.49 kg/m²         "

## 2022-11-23 NOTE — ANESTHESIA PROCEDURE NOTES
Airway  Urgency: elective    Date/Time: 11/23/2022 9:32 AM  Airway not difficult    General Information and Staff    Patient location during procedure: OR  Anesthesiologist: Darrell London MD  CRNA/CAA: aVleri Gonzalez CRNA    Indications and Patient Condition  Indications for airway management: airway protection    Preoxygenated: yes  Mask difficulty assessment: 1 - vent by mask    Final Airway Details  Final airway type: endotracheal airway      Successful airway: ETT  Cuffed: yes   Successful intubation technique: direct laryngoscopy  Facilitating devices/methods: intubating stylet  Endotracheal tube insertion site: oral  Blade: Mauro  Blade size: 3  ETT size (mm): 8.0  Cormack-Lehane Classification: grade I - full view of glottis  Placement verified by: chest auscultation and capnometry   Cuff volume (mL): 8  Measured from: lips  ETT/EBT  to lips (cm): 21  Number of attempts at approach: 1  Assessment: lips, teeth, and gum same as pre-op and atraumatic intubation

## 2022-11-24 ENCOUNTER — APPOINTMENT (OUTPATIENT)
Dept: GENERAL RADIOLOGY | Facility: HOSPITAL | Age: 82
End: 2022-11-24

## 2022-11-24 LAB
BASOPHILS # BLD AUTO: 0.01 10*3/MM3 (ref 0–0.2)
BASOPHILS NFR BLD AUTO: 0.1 % (ref 0–1.5)
DEPRECATED RDW RBC AUTO: 43.1 FL (ref 37–54)
EOSINOPHIL # BLD AUTO: 0 10*3/MM3 (ref 0–0.4)
EOSINOPHIL NFR BLD AUTO: 0 % (ref 0.3–6.2)
ERYTHROCYTE [DISTWIDTH] IN BLOOD BY AUTOMATED COUNT: 13.3 % (ref 12.3–15.4)
HCT VFR BLD AUTO: 31.5 % (ref 37.5–51)
HGB BLD-MCNC: 10.3 G/DL (ref 13–17.7)
IMM GRANULOCYTES # BLD AUTO: 0.06 10*3/MM3 (ref 0–0.05)
IMM GRANULOCYTES NFR BLD AUTO: 0.4 % (ref 0–0.5)
LYMPHOCYTES # BLD AUTO: 1.18 10*3/MM3 (ref 0.7–3.1)
LYMPHOCYTES NFR BLD AUTO: 8.7 % (ref 19.6–45.3)
MCH RBC QN AUTO: 29.3 PG (ref 26.6–33)
MCHC RBC AUTO-ENTMCNC: 32.7 G/DL (ref 31.5–35.7)
MCV RBC AUTO: 89.5 FL (ref 79–97)
MONOCYTES # BLD AUTO: 1.09 10*3/MM3 (ref 0.1–0.9)
MONOCYTES NFR BLD AUTO: 8 % (ref 5–12)
NEUTROPHILS NFR BLD AUTO: 11.26 10*3/MM3 (ref 1.7–7)
NEUTROPHILS NFR BLD AUTO: 82.8 % (ref 42.7–76)
NRBC BLD AUTO-RTO: 0 /100 WBC (ref 0–0.2)
PLATELET # BLD AUTO: 207 10*3/MM3 (ref 140–450)
PMV BLD AUTO: 9.5 FL (ref 6–12)
RBC # BLD AUTO: 3.52 10*6/MM3 (ref 4.14–5.8)
WBC NRBC COR # BLD: 13.6 10*3/MM3 (ref 3.4–10.8)

## 2022-11-24 PROCEDURE — 99024 POSTOP FOLLOW-UP VISIT: CPT | Performed by: NEUROLOGICAL SURGERY

## 2022-11-24 PROCEDURE — A9270 NON-COVERED ITEM OR SERVICE: HCPCS | Performed by: NEUROLOGICAL SURGERY

## 2022-11-24 PROCEDURE — 97530 THERAPEUTIC ACTIVITIES: CPT

## 2022-11-24 PROCEDURE — 63710000001 ATORVASTATIN 20 MG TABLET: Performed by: NEUROLOGICAL SURGERY

## 2022-11-24 PROCEDURE — 97162 PT EVAL MOD COMPLEX 30 MIN: CPT

## 2022-11-24 PROCEDURE — 63710000001 ISOSORBIDE MONONITRATE 60 MG TABLET SUSTAINED-RELEASE 24 HOUR: Performed by: NEUROLOGICAL SURGERY

## 2022-11-24 PROCEDURE — 63710000001 LISINOPRIL 10 MG TABLET: Performed by: NEUROLOGICAL SURGERY

## 2022-11-24 PROCEDURE — 63710000001 MULTIVITAMIN WITH MINERALS TABLET: Performed by: NEUROLOGICAL SURGERY

## 2022-11-24 PROCEDURE — 72100 X-RAY EXAM L-S SPINE 2/3 VWS: CPT

## 2022-11-24 PROCEDURE — 25010000002 SODIUM CHLORIDE 0.9 % WITH KCL 20 MEQ 20-0.9 MEQ/L-% SOLUTION: Performed by: NEUROLOGICAL SURGERY

## 2022-11-24 PROCEDURE — 97110 THERAPEUTIC EXERCISES: CPT

## 2022-11-24 PROCEDURE — 85025 COMPLETE CBC W/AUTO DIFF WBC: CPT | Performed by: NEUROLOGICAL SURGERY

## 2022-11-24 PROCEDURE — 63710000001 HYDROCODONE-ACETAMINOPHEN 5-325 MG TABLET: Performed by: NEUROLOGICAL SURGERY

## 2022-11-24 PROCEDURE — 63710000001 PANTOPRAZOLE 40 MG TABLET DELAYED-RELEASE: Performed by: NEUROLOGICAL SURGERY

## 2022-11-24 PROCEDURE — 63710000001 CYCLOBENZAPRINE 10 MG TABLET: Performed by: NEUROLOGICAL SURGERY

## 2022-11-24 PROCEDURE — 63710000001 CARVEDILOL 6.25 MG TABLET: Performed by: NEUROLOGICAL SURGERY

## 2022-11-24 RX ORDER — CYCLOBENZAPRINE HCL 10 MG
10 TABLET ORAL 3 TIMES DAILY PRN
Status: DISCONTINUED | OUTPATIENT
Start: 2022-11-24 | End: 2022-11-27

## 2022-11-24 RX ADMIN — ISOSORBIDE MONONITRATE 60 MG: 60 TABLET ORAL at 08:41

## 2022-11-24 RX ADMIN — POTASSIUM CHLORIDE AND SODIUM CHLORIDE 100 ML/HR: 900; 150 INJECTION, SOLUTION INTRAVENOUS at 05:37

## 2022-11-24 RX ADMIN — CARVEDILOL 6.25 MG: 6.25 TABLET, FILM COATED ORAL at 08:40

## 2022-11-24 RX ADMIN — HYDROCODONE BITARTRATE AND ACETAMINOPHEN 1 TABLET: 5; 325 TABLET ORAL at 16:22

## 2022-11-24 RX ADMIN — HYDROCODONE BITARTRATE AND ACETAMINOPHEN 1 TABLET: 5; 325 TABLET ORAL at 05:48

## 2022-11-24 RX ADMIN — CARVEDILOL 6.25 MG: 6.25 TABLET, FILM COATED ORAL at 18:10

## 2022-11-24 RX ADMIN — LISINOPRIL 10 MG: 10 TABLET ORAL at 21:37

## 2022-11-24 RX ADMIN — Medication 3 ML: at 20:28

## 2022-11-24 RX ADMIN — ATORVASTATIN CALCIUM 80 MG: 20 TABLET, FILM COATED ORAL at 20:28

## 2022-11-24 RX ADMIN — CYCLOBENZAPRINE 10 MG: 10 TABLET, FILM COATED ORAL at 12:12

## 2022-11-24 RX ADMIN — HYDROCODONE BITARTRATE AND ACETAMINOPHEN 1 TABLET: 5; 325 TABLET ORAL at 20:28

## 2022-11-24 RX ADMIN — HYDROCODONE BITARTRATE AND ACETAMINOPHEN 1 TABLET: 5; 325 TABLET ORAL at 12:12

## 2022-11-24 RX ADMIN — MULTIPLE VITAMINS W/ MINERALS TAB 1 TABLET: TAB at 08:41

## 2022-11-24 RX ADMIN — PANTOPRAZOLE SODIUM 40 MG: 40 TABLET, DELAYED RELEASE ORAL at 08:41

## 2022-11-24 RX ADMIN — CYCLOBENZAPRINE 10 MG: 10 TABLET, FILM COATED ORAL at 20:28

## 2022-11-25 PROCEDURE — A9270 NON-COVERED ITEM OR SERVICE: HCPCS | Performed by: NEUROLOGICAL SURGERY

## 2022-11-25 PROCEDURE — 97530 THERAPEUTIC ACTIVITIES: CPT

## 2022-11-25 PROCEDURE — 63710000001 MULTIVITAMIN WITH MINERALS TABLET: Performed by: NEUROLOGICAL SURGERY

## 2022-11-25 PROCEDURE — 63710000001 ATORVASTATIN 20 MG TABLET: Performed by: NEUROLOGICAL SURGERY

## 2022-11-25 PROCEDURE — 63710000001 CYCLOBENZAPRINE 10 MG TABLET: Performed by: NEUROLOGICAL SURGERY

## 2022-11-25 PROCEDURE — 63710000001 LISINOPRIL 10 MG TABLET: Performed by: NEUROLOGICAL SURGERY

## 2022-11-25 PROCEDURE — 63710000001 PANTOPRAZOLE 40 MG TABLET DELAYED-RELEASE: Performed by: NEUROLOGICAL SURGERY

## 2022-11-25 PROCEDURE — 99024 POSTOP FOLLOW-UP VISIT: CPT | Performed by: NURSE PRACTITIONER

## 2022-11-25 PROCEDURE — 63710000001 ISOSORBIDE MONONITRATE 60 MG TABLET SUSTAINED-RELEASE 24 HOUR: Performed by: NEUROLOGICAL SURGERY

## 2022-11-25 PROCEDURE — 63710000001 HYDROCODONE-ACETAMINOPHEN 5-325 MG TABLET: Performed by: NEUROLOGICAL SURGERY

## 2022-11-25 PROCEDURE — 63710000001 CARVEDILOL 6.25 MG TABLET: Performed by: NEUROLOGICAL SURGERY

## 2022-11-25 RX ADMIN — CYCLOBENZAPRINE 10 MG: 10 TABLET, FILM COATED ORAL at 04:04

## 2022-11-25 RX ADMIN — ATORVASTATIN CALCIUM 80 MG: 20 TABLET, FILM COATED ORAL at 21:07

## 2022-11-25 RX ADMIN — ISOSORBIDE MONONITRATE 60 MG: 60 TABLET ORAL at 08:40

## 2022-11-25 RX ADMIN — HYDROCODONE BITARTRATE AND ACETAMINOPHEN 1 TABLET: 5; 325 TABLET ORAL at 21:15

## 2022-11-25 RX ADMIN — MULTIPLE VITAMINS W/ MINERALS TAB 1 TABLET: TAB at 08:41

## 2022-11-25 RX ADMIN — LISINOPRIL 10 MG: 10 TABLET ORAL at 21:07

## 2022-11-25 RX ADMIN — CARVEDILOL 6.25 MG: 6.25 TABLET, FILM COATED ORAL at 08:41

## 2022-11-25 RX ADMIN — HYDROCODONE BITARTRATE AND ACETAMINOPHEN 1 TABLET: 5; 325 TABLET ORAL at 04:04

## 2022-11-25 RX ADMIN — CARVEDILOL 6.25 MG: 6.25 TABLET, FILM COATED ORAL at 21:07

## 2022-11-25 RX ADMIN — PANTOPRAZOLE SODIUM 40 MG: 40 TABLET, DELAYED RELEASE ORAL at 08:41

## 2022-11-25 RX ADMIN — HYDROCODONE BITARTRATE AND ACETAMINOPHEN 1 TABLET: 5; 325 TABLET ORAL at 15:38

## 2022-11-25 RX ADMIN — Medication 3 ML: at 08:41

## 2022-11-25 RX ADMIN — HYDROCODONE BITARTRATE AND ACETAMINOPHEN 1 TABLET: 5; 325 TABLET ORAL at 08:43

## 2022-11-26 ENCOUNTER — APPOINTMENT (OUTPATIENT)
Dept: GENERAL RADIOLOGY | Facility: HOSPITAL | Age: 82
End: 2022-11-26

## 2022-11-26 LAB
BASOPHILS # BLD AUTO: 0.01 10*3/MM3 (ref 0–0.2)
BASOPHILS NFR BLD AUTO: 0.1 % (ref 0–1.5)
DEPRECATED RDW RBC AUTO: 43.2 FL (ref 37–54)
EOSINOPHIL # BLD AUTO: 0.01 10*3/MM3 (ref 0–0.4)
EOSINOPHIL NFR BLD AUTO: 0.1 % (ref 0.3–6.2)
ERYTHROCYTE [DISTWIDTH] IN BLOOD BY AUTOMATED COUNT: 13.3 % (ref 12.3–15.4)
HCT VFR BLD AUTO: 30.4 % (ref 37.5–51)
HGB BLD-MCNC: 10 G/DL (ref 13–17.7)
IMM GRANULOCYTES # BLD AUTO: 0.04 10*3/MM3 (ref 0–0.05)
IMM GRANULOCYTES NFR BLD AUTO: 0.4 % (ref 0–0.5)
LYMPHOCYTES # BLD AUTO: 1.22 10*3/MM3 (ref 0.7–3.1)
LYMPHOCYTES NFR BLD AUTO: 12.4 % (ref 19.6–45.3)
MCH RBC QN AUTO: 29.1 PG (ref 26.6–33)
MCHC RBC AUTO-ENTMCNC: 32.9 G/DL (ref 31.5–35.7)
MCV RBC AUTO: 88.4 FL (ref 79–97)
MONOCYTES # BLD AUTO: 0.88 10*3/MM3 (ref 0.1–0.9)
MONOCYTES NFR BLD AUTO: 9 % (ref 5–12)
NEUTROPHILS NFR BLD AUTO: 7.66 10*3/MM3 (ref 1.7–7)
NEUTROPHILS NFR BLD AUTO: 78 % (ref 42.7–76)
NRBC BLD AUTO-RTO: 0 /100 WBC (ref 0–0.2)
PLATELET # BLD AUTO: 172 10*3/MM3 (ref 140–450)
PMV BLD AUTO: 9.6 FL (ref 6–12)
RBC # BLD AUTO: 3.44 10*6/MM3 (ref 4.14–5.8)
WBC NRBC COR # BLD: 9.82 10*3/MM3 (ref 3.4–10.8)

## 2022-11-26 PROCEDURE — 63710000001 BISACODYL 10 MG SUPPOSITORY: Performed by: NURSE PRACTITIONER

## 2022-11-26 PROCEDURE — A9270 NON-COVERED ITEM OR SERVICE: HCPCS | Performed by: NEUROLOGICAL SURGERY

## 2022-11-26 PROCEDURE — 63710000001 ISOSORBIDE MONONITRATE 60 MG TABLET SUSTAINED-RELEASE 24 HOUR: Performed by: NEUROLOGICAL SURGERY

## 2022-11-26 PROCEDURE — 63710000001 ATORVASTATIN 20 MG TABLET: Performed by: NEUROLOGICAL SURGERY

## 2022-11-26 PROCEDURE — 63710000001 HYDROCODONE-ACETAMINOPHEN 5-325 MG TABLET: Performed by: NEUROLOGICAL SURGERY

## 2022-11-26 PROCEDURE — A9270 NON-COVERED ITEM OR SERVICE: HCPCS | Performed by: NURSE PRACTITIONER

## 2022-11-26 PROCEDURE — 97535 SELF CARE MNGMENT TRAINING: CPT | Performed by: OCCUPATIONAL THERAPIST

## 2022-11-26 PROCEDURE — 63710000001 PANTOPRAZOLE 40 MG TABLET DELAYED-RELEASE: Performed by: NEUROLOGICAL SURGERY

## 2022-11-26 PROCEDURE — 63710000001 MULTIVITAMIN WITH MINERALS TABLET: Performed by: NEUROLOGICAL SURGERY

## 2022-11-26 PROCEDURE — 63710000001 DOCUSATE SODIUM 100 MG CAPSULE: Performed by: NURSE PRACTITIONER

## 2022-11-26 PROCEDURE — 63710000001 CARVEDILOL 6.25 MG TABLET: Performed by: NEUROLOGICAL SURGERY

## 2022-11-26 PROCEDURE — 85025 COMPLETE CBC W/AUTO DIFF WBC: CPT | Performed by: NURSE PRACTITIONER

## 2022-11-26 PROCEDURE — 97166 OT EVAL MOD COMPLEX 45 MIN: CPT | Performed by: OCCUPATIONAL THERAPIST

## 2022-11-26 PROCEDURE — 63710000001 POLYETHYLENE GLYCOL 17 G PACK: Performed by: NURSE PRACTITIONER

## 2022-11-26 PROCEDURE — 74018 RADEX ABDOMEN 1 VIEW: CPT

## 2022-11-26 PROCEDURE — 99024 POSTOP FOLLOW-UP VISIT: CPT | Performed by: NURSE PRACTITIONER

## 2022-11-26 PROCEDURE — 97110 THERAPEUTIC EXERCISES: CPT

## 2022-11-26 RX ORDER — POLYETHYLENE GLYCOL 3350 17 G/17G
17 POWDER, FOR SOLUTION ORAL DAILY
Status: DISCONTINUED | OUTPATIENT
Start: 2022-11-26 | End: 2022-11-27

## 2022-11-26 RX ORDER — DOCUSATE SODIUM 100 MG/1
200 CAPSULE, LIQUID FILLED ORAL 2 TIMES DAILY
Status: DISCONTINUED | OUTPATIENT
Start: 2022-11-26 | End: 2022-11-27

## 2022-11-26 RX ORDER — BISACODYL 10 MG
10 SUPPOSITORY, RECTAL RECTAL DAILY PRN
Status: DISCONTINUED | OUTPATIENT
Start: 2022-11-26 | End: 2022-11-27

## 2022-11-26 RX ADMIN — CARVEDILOL 6.25 MG: 6.25 TABLET, FILM COATED ORAL at 08:00

## 2022-11-26 RX ADMIN — ISOSORBIDE MONONITRATE 60 MG: 60 TABLET ORAL at 08:00

## 2022-11-26 RX ADMIN — HYDROCODONE BITARTRATE AND ACETAMINOPHEN 1 TABLET: 5; 325 TABLET ORAL at 12:03

## 2022-11-26 RX ADMIN — ATORVASTATIN CALCIUM 80 MG: 20 TABLET, FILM COATED ORAL at 20:33

## 2022-11-26 RX ADMIN — POLYETHYLENE GLYCOL 3350 17 G: 17 POWDER, FOR SOLUTION ORAL at 09:35

## 2022-11-26 RX ADMIN — BISACODYL 10 MG: 10 SUPPOSITORY RECTAL at 18:27

## 2022-11-26 RX ADMIN — Medication 3 ML: at 21:38

## 2022-11-26 RX ADMIN — HYDROCODONE BITARTRATE AND ACETAMINOPHEN 1 TABLET: 5; 325 TABLET ORAL at 15:41

## 2022-11-26 RX ADMIN — HYDROCODONE BITARTRATE AND ACETAMINOPHEN 1 TABLET: 5; 325 TABLET ORAL at 07:59

## 2022-11-26 RX ADMIN — PANTOPRAZOLE SODIUM 40 MG: 40 TABLET, DELAYED RELEASE ORAL at 08:00

## 2022-11-26 RX ADMIN — MULTIPLE VITAMINS W/ MINERALS TAB 1 TABLET: TAB at 08:00

## 2022-11-26 RX ADMIN — DOCUSATE SODIUM 200 MG: 100 CAPSULE, LIQUID FILLED ORAL at 09:35

## 2022-11-26 RX ADMIN — DOCUSATE SODIUM 200 MG: 100 CAPSULE, LIQUID FILLED ORAL at 20:33

## 2022-11-27 ENCOUNTER — APPOINTMENT (OUTPATIENT)
Dept: GENERAL RADIOLOGY | Facility: HOSPITAL | Age: 82
End: 2022-11-27

## 2022-11-27 PROBLEM — N99.89 POSTOPERATIVE URINARY RETENTION: Status: ACTIVE | Noted: 2022-11-27

## 2022-11-27 PROBLEM — E87.1 HYPONATREMIA: Status: ACTIVE | Noted: 2022-11-27

## 2022-11-27 PROBLEM — D72.829 LEUKOCYTOSIS (LEUCOCYTOSIS): Status: ACTIVE | Noted: 2022-11-27

## 2022-11-27 PROBLEM — R33.8 POSTOPERATIVE URINARY RETENTION: Status: ACTIVE | Noted: 2022-11-27

## 2022-11-27 PROBLEM — K91.89 POSTOPERATIVE ILEUS: Status: ACTIVE | Noted: 2022-11-27

## 2022-11-27 PROBLEM — K56.7 POSTOPERATIVE ILEUS (HCC): Status: ACTIVE | Noted: 2022-11-27

## 2022-11-27 LAB
ALBUMIN SERPL-MCNC: 3 G/DL (ref 3.5–5.2)
ALBUMIN SERPL-MCNC: 3.2 G/DL (ref 3.5–5.2)
ANION GAP SERPL CALCULATED.3IONS-SCNC: 11.7 MMOL/L (ref 5–15)
ANION GAP SERPL CALCULATED.3IONS-SCNC: 9 MMOL/L (ref 5–15)
ARTERIAL PATENCY WRIST A: POSITIVE
ATMOSPHERIC PRESS: 742 MMHG
BASE EXCESS BLDA CALC-SCNC: -0.6 MMOL/L (ref 0–2)
BASOPHILS # BLD AUTO: 0.02 10*3/MM3 (ref 0–0.2)
BASOPHILS NFR BLD AUTO: 0.1 % (ref 0–1.5)
BDY SITE: ABNORMAL
BUN SERPL-MCNC: 20 MG/DL (ref 8–23)
BUN SERPL-MCNC: 36 MG/DL (ref 8–23)
BUN/CREAT SERPL: 26 (ref 7–25)
BUN/CREAT SERPL: 35.3 (ref 7–25)
CALCIUM SPEC-SCNC: 8.5 MG/DL (ref 8.6–10.5)
CALCIUM SPEC-SCNC: 8.8 MG/DL (ref 8.6–10.5)
CHLORIDE SERPL-SCNC: 94 MMOL/L (ref 98–107)
CHLORIDE SERPL-SCNC: 95 MMOL/L (ref 98–107)
CO2 SERPL-SCNC: 24.3 MMOL/L (ref 22–29)
CO2 SERPL-SCNC: 25 MMOL/L (ref 22–29)
CREAT SERPL-MCNC: 0.77 MG/DL (ref 0.76–1.27)
CREAT SERPL-MCNC: 1.02 MG/DL (ref 0.76–1.27)
D-LACTATE SERPL-SCNC: 1.9 MMOL/L (ref 0.5–2)
DEPRECATED RDW RBC AUTO: 42.7 FL (ref 37–54)
EGFRCR SERPLBLD CKD-EPI 2021: 73.4 ML/MIN/1.73
EGFRCR SERPLBLD CKD-EPI 2021: 89.4 ML/MIN/1.73
EOSINOPHIL # BLD AUTO: 0 10*3/MM3 (ref 0–0.4)
EOSINOPHIL NFR BLD AUTO: 0 % (ref 0.3–6.2)
ERYTHROCYTE [DISTWIDTH] IN BLOOD BY AUTOMATED COUNT: 13.3 % (ref 12.3–15.4)
GAS FLOW AIRWAY: 1 LPM
GLUCOSE SERPL-MCNC: 173 MG/DL (ref 65–99)
GLUCOSE SERPL-MCNC: 180 MG/DL (ref 65–99)
HCO3 BLDA-SCNC: 22.6 MMOL/L (ref 22–28)
HCT VFR BLD AUTO: 33.8 % (ref 37.5–51)
HGB BLD-MCNC: 11.3 G/DL (ref 13–17.7)
IMM GRANULOCYTES # BLD AUTO: 0.07 10*3/MM3 (ref 0–0.05)
IMM GRANULOCYTES NFR BLD AUTO: 0.5 % (ref 0–0.5)
LYMPHOCYTES # BLD AUTO: 0.97 10*3/MM3 (ref 0.7–3.1)
LYMPHOCYTES NFR BLD AUTO: 6.4 % (ref 19.6–45.3)
MCH RBC QN AUTO: 29.3 PG (ref 26.6–33)
MCHC RBC AUTO-ENTMCNC: 33.4 G/DL (ref 31.5–35.7)
MCV RBC AUTO: 87.6 FL (ref 79–97)
MODALITY: ABNORMAL
MONOCYTES # BLD AUTO: 0.89 10*3/MM3 (ref 0.1–0.9)
MONOCYTES NFR BLD AUTO: 5.9 % (ref 5–12)
NEUTROPHILS NFR BLD AUTO: 13.16 10*3/MM3 (ref 1.7–7)
NEUTROPHILS NFR BLD AUTO: 87.1 % (ref 42.7–76)
NRBC BLD AUTO-RTO: 0 /100 WBC (ref 0–0.2)
OSMOLALITY SERPL: 288 MOSM/KG (ref 280–301)
OSMOLALITY UR: 825 MOSM/KG (ref 300–800)
PCO2 BLDA: 31.6 MM HG (ref 35–45)
PH BLDA: 7.46 PH UNITS (ref 7.35–7.45)
PHOSPHATE SERPL-MCNC: 3.1 MG/DL (ref 2.5–4.5)
PHOSPHATE SERPL-MCNC: 3.1 MG/DL (ref 2.5–4.5)
PLATELET # BLD AUTO: 237 10*3/MM3 (ref 140–450)
PMV BLD AUTO: 9.4 FL (ref 6–12)
PO2 BLDA: 61.2 MM HG (ref 80–100)
POTASSIUM SERPL-SCNC: 4 MMOL/L (ref 3.5–5.2)
POTASSIUM SERPL-SCNC: 4.6 MMOL/L (ref 3.5–5.2)
QT INTERVAL: 297 MS
RBC # BLD AUTO: 3.86 10*6/MM3 (ref 4.14–5.8)
SAO2 % BLDCOA: 92.7 % (ref 92–99)
SODIUM SERPL-SCNC: 128 MMOL/L (ref 136–145)
SODIUM SERPL-SCNC: 131 MMOL/L (ref 136–145)
SODIUM UR-SCNC: <20 MMOL/L
TOTAL RATE: 20 BREATHS/MINUTE
URATE SERPL-MCNC: 4.4 MG/DL (ref 3.4–7)
WBC NRBC COR # BLD: 15.11 10*3/MM3 (ref 3.4–10.8)

## 2022-11-27 PROCEDURE — 36600 WITHDRAWAL OF ARTERIAL BLOOD: CPT

## 2022-11-27 PROCEDURE — 93010 ELECTROCARDIOGRAM REPORT: CPT | Performed by: STUDENT IN AN ORGANIZED HEALTH CARE EDUCATION/TRAINING PROGRAM

## 2022-11-27 PROCEDURE — A9270 NON-COVERED ITEM OR SERVICE: HCPCS | Performed by: INTERNAL MEDICINE

## 2022-11-27 PROCEDURE — 84550 ASSAY OF BLOOD/URIC ACID: CPT | Performed by: HOSPITALIST

## 2022-11-27 PROCEDURE — 85025 COMPLETE CBC W/AUTO DIFF WBC: CPT | Performed by: HOSPITALIST

## 2022-11-27 PROCEDURE — A9270 NON-COVERED ITEM OR SERVICE: HCPCS | Performed by: NURSE PRACTITIONER

## 2022-11-27 PROCEDURE — 63710000001 HYDROCODONE-ACETAMINOPHEN 5-325 MG TABLET: Performed by: NEUROLOGICAL SURGERY

## 2022-11-27 PROCEDURE — 83930 ASSAY OF BLOOD OSMOLALITY: CPT | Performed by: INTERNAL MEDICINE

## 2022-11-27 PROCEDURE — 25010000002 VANCOMYCIN 750 MG RECONSTITUTED SOLUTION: Performed by: NURSE PRACTITIONER

## 2022-11-27 PROCEDURE — 93005 ELECTROCARDIOGRAM TRACING: CPT | Performed by: NURSE PRACTITIONER

## 2022-11-27 PROCEDURE — A9270 NON-COVERED ITEM OR SERVICE: HCPCS | Performed by: NEUROLOGICAL SURGERY

## 2022-11-27 PROCEDURE — 63710000001 ATORVASTATIN 20 MG TABLET: Performed by: NEUROLOGICAL SURGERY

## 2022-11-27 PROCEDURE — 0T9B70Z DRAINAGE OF BLADDER WITH DRAINAGE DEVICE, VIA NATURAL OR ARTIFICIAL OPENING: ICD-10-PCS | Performed by: UROLOGY

## 2022-11-27 PROCEDURE — 25010000002 SODIUM CHLORIDE 0.9 % WITH KCL 20 MEQ 20-0.9 MEQ/L-% SOLUTION: Performed by: HOSPITALIST

## 2022-11-27 PROCEDURE — 80069 RENAL FUNCTION PANEL: CPT | Performed by: HOSPITALIST

## 2022-11-27 PROCEDURE — 71045 X-RAY EXAM CHEST 1 VIEW: CPT

## 2022-11-27 PROCEDURE — 63710000001 TAMSULOSIN 0.4 MG CAPSULE: Performed by: UROLOGY

## 2022-11-27 PROCEDURE — 63710000001 METHOCARBAMOL 500 MG TABLET: Performed by: NURSE PRACTITIONER

## 2022-11-27 PROCEDURE — 84300 ASSAY OF URINE SODIUM: CPT | Performed by: HOSPITALIST

## 2022-11-27 PROCEDURE — 83605 ASSAY OF LACTIC ACID: CPT | Performed by: NURSE PRACTITIONER

## 2022-11-27 PROCEDURE — 82803 BLOOD GASES ANY COMBINATION: CPT

## 2022-11-27 PROCEDURE — 63710000001 BISACODYL 10 MG SUPPOSITORY: Performed by: SURGERY

## 2022-11-27 PROCEDURE — 63710000001 LISINOPRIL 10 MG TABLET: Performed by: INTERNAL MEDICINE

## 2022-11-27 PROCEDURE — A9270 NON-COVERED ITEM OR SERVICE: HCPCS | Performed by: SURGERY

## 2022-11-27 PROCEDURE — 63710000001 TRAMADOL 50 MG TABLET: Performed by: NURSE PRACTITIONER

## 2022-11-27 PROCEDURE — 80069 RENAL FUNCTION PANEL: CPT | Performed by: INTERNAL MEDICINE

## 2022-11-27 PROCEDURE — 99024 POSTOP FOLLOW-UP VISIT: CPT | Performed by: NURSE PRACTITIONER

## 2022-11-27 PROCEDURE — 25010000002 METHYLNALTREXONE 12 MG/0.6ML SOLUTION: Performed by: SURGERY

## 2022-11-27 PROCEDURE — 63710000001 CARVEDILOL 6.25 MG TABLET: Performed by: NEUROLOGICAL SURGERY

## 2022-11-27 PROCEDURE — 99221 1ST HOSP IP/OBS SF/LOW 40: CPT | Performed by: SURGERY

## 2022-11-27 PROCEDURE — 97110 THERAPEUTIC EXERCISES: CPT

## 2022-11-27 PROCEDURE — 25010000002 ONDANSETRON PER 1 MG: Performed by: NEUROLOGICAL SURGERY

## 2022-11-27 PROCEDURE — 83935 ASSAY OF URINE OSMOLALITY: CPT | Performed by: INTERNAL MEDICINE

## 2022-11-27 PROCEDURE — A9270 NON-COVERED ITEM OR SERVICE: HCPCS | Performed by: UROLOGY

## 2022-11-27 RX ORDER — TAMSULOSIN HYDROCHLORIDE 0.4 MG/1
0.4 CAPSULE ORAL DAILY
Status: DISCONTINUED | OUTPATIENT
Start: 2022-11-27 | End: 2022-12-13 | Stop reason: HOSPADM

## 2022-11-27 RX ORDER — SODIUM CHLORIDE AND POTASSIUM CHLORIDE 150; 900 MG/100ML; MG/100ML
100 INJECTION, SOLUTION INTRAVENOUS CONTINUOUS
Status: DISCONTINUED | OUTPATIENT
Start: 2022-11-27 | End: 2022-11-28

## 2022-11-27 RX ORDER — LISINOPRIL 5 MG/1
5 TABLET ORAL NIGHTLY
Status: DISCONTINUED | OUTPATIENT
Start: 2022-11-27 | End: 2022-11-28

## 2022-11-27 RX ORDER — TRAMADOL HYDROCHLORIDE 50 MG/1
50 TABLET ORAL EVERY 8 HOURS PRN
Status: DISPENSED | OUTPATIENT
Start: 2022-11-27 | End: 2022-12-04

## 2022-11-27 RX ORDER — METHOCARBAMOL 500 MG/1
500 TABLET, FILM COATED ORAL EVERY 8 HOURS SCHEDULED
Status: DISPENSED | OUTPATIENT
Start: 2022-11-27 | End: 2022-11-28

## 2022-11-27 RX ORDER — BISACODYL 10 MG
10 SUPPOSITORY, RECTAL RECTAL DAILY
Status: DISCONTINUED | OUTPATIENT
Start: 2022-11-27 | End: 2022-12-13 | Stop reason: HOSPADM

## 2022-11-27 RX ADMIN — HYDROCODONE BITARTRATE AND ACETAMINOPHEN 1 TABLET: 5; 325 TABLET ORAL at 10:56

## 2022-11-27 RX ADMIN — TRAMADOL HYDROCHLORIDE 50 MG: 50 TABLET, COATED ORAL at 15:22

## 2022-11-27 RX ADMIN — SODIUM CHLORIDE 750 MG: 900 INJECTION, SOLUTION INTRAVENOUS at 16:04

## 2022-11-27 RX ADMIN — TAMSULOSIN HYDROCHLORIDE 0.4 MG: 0.4 CAPSULE ORAL at 10:05

## 2022-11-27 RX ADMIN — LISINOPRIL 5 MG: 10 TABLET ORAL at 20:13

## 2022-11-27 RX ADMIN — SODIUM CHLORIDE 500 ML: 9 INJECTION, SOLUTION INTRAVENOUS at 08:09

## 2022-11-27 RX ADMIN — TRAMADOL HYDROCHLORIDE 50 MG: 50 TABLET, COATED ORAL at 23:10

## 2022-11-27 RX ADMIN — POTASSIUM CHLORIDE AND SODIUM CHLORIDE 100 ML/HR: 900; 150 INJECTION, SOLUTION INTRAVENOUS at 23:10

## 2022-11-27 RX ADMIN — BISACODYL 10 MG: 10 SUPPOSITORY RECTAL at 13:18

## 2022-11-27 RX ADMIN — METHOCARBAMOL TABLETS 500 MG: 500 TABLET, COATED ORAL at 16:04

## 2022-11-27 RX ADMIN — METHOCARBAMOL TABLETS 500 MG: 500 TABLET, COATED ORAL at 22:57

## 2022-11-27 RX ADMIN — METHYLNALTREXONE BROMIDE 10.2 MG: 12 INJECTION, SOLUTION SUBCUTANEOUS at 14:30

## 2022-11-27 RX ADMIN — CARVEDILOL 6.25 MG: 6.25 TABLET, FILM COATED ORAL at 18:39

## 2022-11-27 RX ADMIN — POTASSIUM CHLORIDE AND SODIUM CHLORIDE 100 ML/HR: 900; 150 INJECTION, SOLUTION INTRAVENOUS at 11:04

## 2022-11-27 RX ADMIN — ATORVASTATIN CALCIUM 80 MG: 20 TABLET, FILM COATED ORAL at 20:12

## 2022-11-27 RX ADMIN — Medication 3 ML: at 08:10

## 2022-11-27 RX ADMIN — ONDANSETRON 4 MG: 2 INJECTION INTRAMUSCULAR; INTRAVENOUS at 11:04

## 2022-11-28 ENCOUNTER — APPOINTMENT (OUTPATIENT)
Dept: GENERAL RADIOLOGY | Facility: HOSPITAL | Age: 82
End: 2022-11-28

## 2022-11-28 LAB
ALBUMIN SERPL-MCNC: 2.3 G/DL (ref 3.5–5.2)
ALBUMIN SERPL-MCNC: 2.5 G/DL (ref 3.5–5.2)
ANION GAP SERPL CALCULATED.3IONS-SCNC: 10.9 MMOL/L (ref 5–15)
ANION GAP SERPL CALCULATED.3IONS-SCNC: 12 MMOL/L (ref 5–15)
BACTERIA UR QL AUTO: NORMAL /HPF
BILIRUB UR QL STRIP: NEGATIVE
BUN SERPL-MCNC: 59 MG/DL (ref 8–23)
BUN SERPL-MCNC: 62 MG/DL (ref 8–23)
BUN/CREAT SERPL: 33 (ref 7–25)
BUN/CREAT SERPL: 33.9 (ref 7–25)
CALCIUM SPEC-SCNC: 7.9 MG/DL (ref 8.6–10.5)
CALCIUM SPEC-SCNC: 8 MG/DL (ref 8.6–10.5)
CHLORIDE SERPL-SCNC: 100 MMOL/L (ref 98–107)
CHLORIDE SERPL-SCNC: 106 MMOL/L (ref 98–107)
CLARITY UR: ABNORMAL
CO2 SERPL-SCNC: 21 MMOL/L (ref 22–29)
CO2 SERPL-SCNC: 22.1 MMOL/L (ref 22–29)
COLOR UR: YELLOW
CREAT SERPL-MCNC: 1.79 MG/DL (ref 0.76–1.27)
CREAT SERPL-MCNC: 1.83 MG/DL (ref 0.76–1.27)
DEPRECATED RDW RBC AUTO: 40.5 FL (ref 37–54)
EGFRCR SERPLBLD CKD-EPI 2021: 36.4 ML/MIN/1.73
EGFRCR SERPLBLD CKD-EPI 2021: 37.4 ML/MIN/1.73
ERYTHROCYTE [DISTWIDTH] IN BLOOD BY AUTOMATED COUNT: 12.9 % (ref 12.3–15.4)
GLUCOSE BLDC GLUCOMTR-MCNC: 201 MG/DL (ref 70–130)
GLUCOSE SERPL-MCNC: 136 MG/DL (ref 65–99)
GLUCOSE SERPL-MCNC: 151 MG/DL (ref 65–99)
GLUCOSE UR STRIP-MCNC: NEGATIVE MG/DL
HBA1C MFR BLD: 5.3 % (ref 4.8–5.6)
HCT VFR BLD AUTO: 30 % (ref 37.5–51)
HGB BLD-MCNC: 10.3 G/DL (ref 13–17.7)
HGB UR QL STRIP.AUTO: ABNORMAL
HYALINE CASTS UR QL AUTO: NORMAL /LPF
KETONES UR QL STRIP: NEGATIVE
LEUKOCYTE ESTERASE UR QL STRIP.AUTO: ABNORMAL
LYMPHOCYTES # BLD MANUAL: 0.85 10*3/MM3 (ref 0.7–3.1)
LYMPHOCYTES NFR BLD MANUAL: 7.1 % (ref 5–12)
MAGNESIUM SERPL-MCNC: 2.3 MG/DL (ref 1.6–2.4)
MCH RBC QN AUTO: 29.1 PG (ref 26.6–33)
MCHC RBC AUTO-ENTMCNC: 34.3 G/DL (ref 31.5–35.7)
MCV RBC AUTO: 84.7 FL (ref 79–97)
MONOCYTES # BLD: 1.18 10*3/MM3 (ref 0.1–0.9)
NEUTROPHILS # BLD AUTO: 14.63 10*3/MM3 (ref 1.7–7)
NEUTROPHILS NFR BLD MANUAL: 87.8 % (ref 42.7–76)
NITRITE UR QL STRIP: NEGATIVE
NRBC BLD AUTO-RTO: 0 /100 WBC (ref 0–0.2)
PH UR STRIP.AUTO: <=5 [PH] (ref 5–8)
PHOSPHATE SERPL-MCNC: 2.8 MG/DL (ref 2.5–4.5)
PHOSPHATE SERPL-MCNC: 3.6 MG/DL (ref 2.5–4.5)
PLAT MORPH BLD: NORMAL
PLATELET # BLD AUTO: 254 10*3/MM3 (ref 140–450)
PMV BLD AUTO: 9.4 FL (ref 6–12)
POTASSIUM SERPL-SCNC: 4.8 MMOL/L (ref 3.5–5.2)
POTASSIUM SERPL-SCNC: 4.9 MMOL/L (ref 3.5–5.2)
PROT UR QL STRIP: ABNORMAL
RBC # BLD AUTO: 3.54 10*6/MM3 (ref 4.14–5.8)
RBC # UR STRIP: NORMAL /HPF
RBC MORPH BLD: NORMAL
REF LAB TEST METHOD: NORMAL
SODIUM SERPL-SCNC: 133 MMOL/L (ref 136–145)
SODIUM SERPL-SCNC: 139 MMOL/L (ref 136–145)
SODIUM UR-SCNC: <20 MMOL/L
SP GR UR STRIP: 1.02 (ref 1–1.03)
SQUAMOUS #/AREA URNS HPF: NORMAL /HPF
URATE SERPL-MCNC: 5.5 MG/DL (ref 3.4–7)
UROBILINOGEN UR QL STRIP: ABNORMAL
VANCOMYCIN TROUGH SERPL-MCNC: 10.6 MCG/ML (ref 5–20)
VARIANT LYMPHS NFR BLD MANUAL: 5.1 % (ref 19.6–45.3)
WBC # UR STRIP: NORMAL /HPF
WBC MORPH BLD: NORMAL
WBC NRBC COR # BLD: 16.66 10*3/MM3 (ref 3.4–10.8)

## 2022-11-28 PROCEDURE — 82962 GLUCOSE BLOOD TEST: CPT

## 2022-11-28 PROCEDURE — 87086 URINE CULTURE/COLONY COUNT: CPT | Performed by: UROLOGY

## 2022-11-28 PROCEDURE — 80069 RENAL FUNCTION PANEL: CPT | Performed by: HOSPITALIST

## 2022-11-28 PROCEDURE — 99232 SBSQ HOSP IP/OBS MODERATE 35: CPT | Performed by: SURGERY

## 2022-11-28 PROCEDURE — 3E03329 INTRODUCTION OF OTHER ANTI-INFECTIVE INTO PERIPHERAL VEIN, PERCUTANEOUS APPROACH: ICD-10-PCS | Performed by: HOSPITALIST

## 2022-11-28 PROCEDURE — A9270 NON-COVERED ITEM OR SERVICE: HCPCS | Performed by: SURGERY

## 2022-11-28 PROCEDURE — 83735 ASSAY OF MAGNESIUM: CPT | Performed by: INTERNAL MEDICINE

## 2022-11-28 PROCEDURE — 25010000002 VANCOMYCIN 750 MG RECONSTITUTED SOLUTION: Performed by: NURSE PRACTITIONER

## 2022-11-28 PROCEDURE — 84300 ASSAY OF URINE SODIUM: CPT | Performed by: HOSPITALIST

## 2022-11-28 PROCEDURE — 85025 COMPLETE CBC W/AUTO DIFF WBC: CPT | Performed by: NURSE PRACTITIONER

## 2022-11-28 PROCEDURE — 80069 RENAL FUNCTION PANEL: CPT | Performed by: INTERNAL MEDICINE

## 2022-11-28 PROCEDURE — 74018 RADEX ABDOMEN 1 VIEW: CPT

## 2022-11-28 PROCEDURE — 99024 POSTOP FOLLOW-UP VISIT: CPT

## 2022-11-28 PROCEDURE — 84550 ASSAY OF BLOOD/URIC ACID: CPT | Performed by: HOSPITALIST

## 2022-11-28 PROCEDURE — 25010000002 PIPERACILLIN SOD-TAZOBACTAM PER 1 G: Performed by: HOSPITALIST

## 2022-11-28 PROCEDURE — 85007 BL SMEAR W/DIFF WBC COUNT: CPT | Performed by: NURSE PRACTITIONER

## 2022-11-28 PROCEDURE — 63710000001 BISACODYL 10 MG SUPPOSITORY: Performed by: SURGERY

## 2022-11-28 PROCEDURE — 74019 RADEX ABDOMEN 2 VIEWS: CPT

## 2022-11-28 PROCEDURE — 80202 ASSAY OF VANCOMYCIN: CPT | Performed by: NURSE PRACTITIONER

## 2022-11-28 PROCEDURE — 81001 URINALYSIS AUTO W/SCOPE: CPT | Performed by: UROLOGY

## 2022-11-28 PROCEDURE — 83036 HEMOGLOBIN GLYCOSYLATED A1C: CPT | Performed by: HOSPITALIST

## 2022-11-28 PROCEDURE — 0D9670Z DRAINAGE OF STOMACH WITH DRAINAGE DEVICE, VIA NATURAL OR ARTIFICIAL OPENING: ICD-10-PCS | Performed by: SURGERY

## 2022-11-28 RX ORDER — PANTOPRAZOLE SODIUM 40 MG/10ML
40 INJECTION, POWDER, LYOPHILIZED, FOR SOLUTION INTRAVENOUS
Status: DISCONTINUED | OUTPATIENT
Start: 2022-11-28 | End: 2022-12-13 | Stop reason: HOSPADM

## 2022-11-28 RX ORDER — SODIUM CHLORIDE 9 MG/ML
75 INJECTION, SOLUTION INTRAVENOUS CONTINUOUS
Status: DISCONTINUED | OUTPATIENT
Start: 2022-11-28 | End: 2022-11-30

## 2022-11-28 RX ADMIN — TAZOBACTAM SODIUM AND PIPERACILLIN SODIUM 3.38 G: 375; 3 INJECTION, SOLUTION INTRAVENOUS at 17:18

## 2022-11-28 RX ADMIN — Medication 3 ML: at 20:50

## 2022-11-28 RX ADMIN — SODIUM CHLORIDE 750 MG: 900 INJECTION, SOLUTION INTRAVENOUS at 18:09

## 2022-11-28 RX ADMIN — SODIUM CHLORIDE 750 MG: 900 INJECTION, SOLUTION INTRAVENOUS at 04:06

## 2022-11-28 RX ADMIN — SODIUM CHLORIDE 150 ML/HR: 9 INJECTION, SOLUTION INTRAVENOUS at 17:19

## 2022-11-28 RX ADMIN — SODIUM CHLORIDE 500 ML: 9 INJECTION, SOLUTION INTRAVENOUS at 10:02

## 2022-11-28 RX ADMIN — TAZOBACTAM SODIUM AND PIPERACILLIN SODIUM 3.38 G: 375; 3 INJECTION, SOLUTION INTRAVENOUS at 12:14

## 2022-11-28 RX ADMIN — SODIUM CHLORIDE 1000 ML: 9 INJECTION, SOLUTION INTRAVENOUS at 01:37

## 2022-11-28 RX ADMIN — PANTOPRAZOLE SODIUM 40 MG: 40 INJECTION, POWDER, FOR SOLUTION INTRAVENOUS at 10:31

## 2022-11-28 RX ADMIN — BISACODYL 10 MG: 10 SUPPOSITORY RECTAL at 10:31

## 2022-11-28 RX ADMIN — SODIUM CHLORIDE 150 ML/HR: 9 INJECTION, SOLUTION INTRAVENOUS at 10:32

## 2022-11-28 RX ADMIN — Medication 3 ML: at 10:30

## 2022-11-29 ENCOUNTER — APPOINTMENT (OUTPATIENT)
Dept: GENERAL RADIOLOGY | Facility: HOSPITAL | Age: 82
End: 2022-11-29

## 2022-11-29 PROBLEM — M48.062 SPINAL STENOSIS OF LUMBAR REGION WITH NEUROGENIC CLAUDICATION: Status: ACTIVE | Noted: 2022-11-29

## 2022-11-29 LAB
ALBUMIN SERPL-MCNC: 2.1 G/DL (ref 3.5–5.2)
ALBUMIN/GLOB SERPL: 0.7 G/DL
ALP SERPL-CCNC: 88 U/L (ref 39–117)
ALT SERPL W P-5'-P-CCNC: 77 U/L (ref 1–41)
ANION GAP SERPL CALCULATED.3IONS-SCNC: 11 MMOL/L (ref 5–15)
AST SERPL-CCNC: 102 U/L (ref 1–40)
BACTERIA SPEC AEROBE CULT: NO GROWTH
BASOPHILS # BLD AUTO: 0.04 10*3/MM3 (ref 0–0.2)
BASOPHILS NFR BLD AUTO: 0.3 % (ref 0–1.5)
BILIRUB SERPL-MCNC: 0.5 MG/DL (ref 0–1.2)
BUN SERPL-MCNC: 58 MG/DL (ref 8–23)
BUN/CREAT SERPL: 41.7 (ref 7–25)
CALCIUM SPEC-SCNC: 8.1 MG/DL (ref 8.6–10.5)
CHLORIDE SERPL-SCNC: 109 MMOL/L (ref 98–107)
CO2 SERPL-SCNC: 19 MMOL/L (ref 22–29)
CREAT SERPL-MCNC: 1.39 MG/DL (ref 0.76–1.27)
DEPRECATED RDW RBC AUTO: 41.8 FL (ref 37–54)
EGFRCR SERPLBLD CKD-EPI 2021: 50.6 ML/MIN/1.73
EOSINOPHIL # BLD AUTO: 0 10*3/MM3 (ref 0–0.4)
EOSINOPHIL NFR BLD AUTO: 0 % (ref 0.3–6.2)
ERYTHROCYTE [DISTWIDTH] IN BLOOD BY AUTOMATED COUNT: 12.9 % (ref 12.3–15.4)
GLOBULIN UR ELPH-MCNC: 3.2 GM/DL
GLUCOSE SERPL-MCNC: 109 MG/DL (ref 65–99)
HCT VFR BLD AUTO: 29.9 % (ref 37.5–51)
HGB BLD-MCNC: 9.5 G/DL (ref 13–17.7)
LYMPHOCYTES # BLD AUTO: 0.5 10*3/MM3 (ref 0.7–3.1)
LYMPHOCYTES NFR BLD AUTO: 3.6 % (ref 19.6–45.3)
MAGNESIUM SERPL-MCNC: 2.8 MG/DL (ref 1.6–2.4)
MCH RBC QN AUTO: 28.4 PG (ref 26.6–33)
MCHC RBC AUTO-ENTMCNC: 31.8 G/DL (ref 31.5–35.7)
MCV RBC AUTO: 89.3 FL (ref 79–97)
MONOCYTES # BLD AUTO: 1.13 10*3/MM3 (ref 0.1–0.9)
MONOCYTES NFR BLD AUTO: 8.2 % (ref 5–12)
MRSA DNA SPEC QL NAA+PROBE: NORMAL
NEUTROPHILS NFR BLD AUTO: 11.99 10*3/MM3 (ref 1.7–7)
NEUTROPHILS NFR BLD AUTO: 86.5 % (ref 42.7–76)
PLATELET # BLD AUTO: 246 10*3/MM3 (ref 140–450)
PMV BLD AUTO: 9.5 FL (ref 6–12)
POTASSIUM SERPL-SCNC: 4.5 MMOL/L (ref 3.5–5.2)
PROT SERPL-MCNC: 5.3 G/DL (ref 6–8.5)
RBC # BLD AUTO: 3.35 10*6/MM3 (ref 4.14–5.8)
SODIUM SERPL-SCNC: 139 MMOL/L (ref 136–145)
VANCOMYCIN SERPL-MCNC: 12 MCG/ML (ref 5–40)
WBC NRBC COR # BLD: 13.85 10*3/MM3 (ref 3.4–10.8)

## 2022-11-29 PROCEDURE — 74018 RADEX ABDOMEN 1 VIEW: CPT

## 2022-11-29 PROCEDURE — 80053 COMPREHEN METABOLIC PANEL: CPT | Performed by: HOSPITALIST

## 2022-11-29 PROCEDURE — 83735 ASSAY OF MAGNESIUM: CPT | Performed by: INTERNAL MEDICINE

## 2022-11-29 PROCEDURE — 25010000002 VANCOMYCIN PER 500 MG: Performed by: NURSE PRACTITIONER

## 2022-11-29 PROCEDURE — 85025 COMPLETE CBC W/AUTO DIFF WBC: CPT | Performed by: HOSPITALIST

## 2022-11-29 PROCEDURE — 99232 SBSQ HOSP IP/OBS MODERATE 35: CPT | Performed by: SURGERY

## 2022-11-29 PROCEDURE — A9270 NON-COVERED ITEM OR SERVICE: HCPCS | Performed by: SURGERY

## 2022-11-29 PROCEDURE — 97530 THERAPEUTIC ACTIVITIES: CPT

## 2022-11-29 PROCEDURE — 99024 POSTOP FOLLOW-UP VISIT: CPT

## 2022-11-29 PROCEDURE — 87641 MR-STAPH DNA AMP PROBE: CPT | Performed by: HOSPITALIST

## 2022-11-29 PROCEDURE — 63710000001 BISACODYL 10 MG SUPPOSITORY: Performed by: SURGERY

## 2022-11-29 PROCEDURE — 80202 ASSAY OF VANCOMYCIN: CPT | Performed by: NURSE PRACTITIONER

## 2022-11-29 PROCEDURE — 25010000002 PIPERACILLIN SOD-TAZOBACTAM PER 1 G: Performed by: HOSPITALIST

## 2022-11-29 RX ADMIN — MULTIPLE VITAMINS W/ MINERALS TAB 1 TABLET: TAB at 09:44

## 2022-11-29 RX ADMIN — VANCOMYCIN HYDROCHLORIDE 500 MG: 500 INJECTION, POWDER, LYOPHILIZED, FOR SOLUTION INTRAVENOUS at 09:55

## 2022-11-29 RX ADMIN — TAZOBACTAM SODIUM AND PIPERACILLIN SODIUM 3.38 G: 375; 3 INJECTION, SOLUTION INTRAVENOUS at 00:51

## 2022-11-29 RX ADMIN — BISACODYL 10 MG: 10 SUPPOSITORY RECTAL at 09:35

## 2022-11-29 RX ADMIN — TAZOBACTAM SODIUM AND PIPERACILLIN SODIUM 3.38 G: 375; 3 INJECTION, SOLUTION INTRAVENOUS at 17:09

## 2022-11-29 RX ADMIN — Medication 3 ML: at 20:22

## 2022-11-29 RX ADMIN — VANCOMYCIN HYDROCHLORIDE 500 MG: 500 INJECTION, POWDER, LYOPHILIZED, FOR SOLUTION INTRAVENOUS at 20:22

## 2022-11-29 RX ADMIN — SODIUM CHLORIDE 75 ML/HR: 9 INJECTION, SOLUTION INTRAVENOUS at 21:21

## 2022-11-29 RX ADMIN — PANTOPRAZOLE SODIUM 40 MG: 40 INJECTION, POWDER, FOR SOLUTION INTRAVENOUS at 05:16

## 2022-11-29 RX ADMIN — TAZOBACTAM SODIUM AND PIPERACILLIN SODIUM 3.38 G: 375; 3 INJECTION, SOLUTION INTRAVENOUS at 23:45

## 2022-11-29 RX ADMIN — Medication 3 ML: at 09:45

## 2022-11-29 RX ADMIN — TAZOBACTAM SODIUM AND PIPERACILLIN SODIUM 3.38 G: 375; 3 INJECTION, SOLUTION INTRAVENOUS at 11:17

## 2022-11-30 ENCOUNTER — APPOINTMENT (OUTPATIENT)
Dept: CT IMAGING | Facility: HOSPITAL | Age: 82
End: 2022-11-30

## 2022-11-30 PROBLEM — E87.1 HYPONATREMIA: Status: RESOLVED | Noted: 2022-11-27 | Resolved: 2022-11-30

## 2022-11-30 LAB
ALBUMIN SERPL-MCNC: 2.4 G/DL (ref 3.5–5.2)
ALBUMIN SERPL-MCNC: 2.6 G/DL (ref 3.5–5.2)
ANION GAP SERPL CALCULATED.3IONS-SCNC: 11 MMOL/L (ref 5–15)
ANION GAP SERPL CALCULATED.3IONS-SCNC: 11.6 MMOL/L (ref 5–15)
BUN SERPL-MCNC: 52 MG/DL (ref 8–23)
BUN SERPL-MCNC: 54 MG/DL (ref 8–23)
BUN/CREAT SERPL: 39.1 (ref 7–25)
BUN/CREAT SERPL: 48.6 (ref 7–25)
BURR CELLS BLD QL SMEAR: ABNORMAL
CALCIUM SPEC-SCNC: 8.3 MG/DL (ref 8.6–10.5)
CALCIUM SPEC-SCNC: 8.9 MG/DL (ref 8.6–10.5)
CHLORIDE SERPL-SCNC: 114 MMOL/L (ref 98–107)
CHLORIDE SERPL-SCNC: 118 MMOL/L (ref 98–107)
CO2 SERPL-SCNC: 22.4 MMOL/L (ref 22–29)
CO2 SERPL-SCNC: 23 MMOL/L (ref 22–29)
CREAT SERPL-MCNC: 1.07 MG/DL (ref 0.76–1.27)
CREAT SERPL-MCNC: 1.38 MG/DL (ref 0.76–1.27)
D-LACTATE SERPL-SCNC: 1.2 MMOL/L (ref 0.5–2)
DEPRECATED RDW RBC AUTO: 44.3 FL (ref 37–54)
EGFRCR SERPLBLD CKD-EPI 2021: 51.1 ML/MIN/1.73
EGFRCR SERPLBLD CKD-EPI 2021: 69.3 ML/MIN/1.73
ERYTHROCYTE [DISTWIDTH] IN BLOOD BY AUTOMATED COUNT: 13.5 % (ref 12.3–15.4)
GLUCOSE SERPL-MCNC: 102 MG/DL (ref 65–99)
GLUCOSE SERPL-MCNC: 107 MG/DL (ref 65–99)
HCT VFR BLD AUTO: 27.8 % (ref 37.5–51)
HGB BLD-MCNC: 8.9 G/DL (ref 13–17.7)
LYMPHOCYTES # BLD MANUAL: 0.43 10*3/MM3 (ref 0.7–3.1)
LYMPHOCYTES NFR BLD MANUAL: 8.1 % (ref 5–12)
MCH RBC QN AUTO: 28.7 PG (ref 26.6–33)
MCHC RBC AUTO-ENTMCNC: 32 G/DL (ref 31.5–35.7)
MCV RBC AUTO: 89.7 FL (ref 79–97)
MONOCYTES # BLD: 1.15 10*3/MM3 (ref 0.1–0.9)
NEUTROPHILS # BLD AUTO: 12.61 10*3/MM3 (ref 1.7–7)
NEUTROPHILS NFR BLD MANUAL: 88.9 % (ref 42.7–76)
PHOSPHATE SERPL-MCNC: 3.3 MG/DL (ref 2.5–4.5)
PHOSPHATE SERPL-MCNC: 3.9 MG/DL (ref 2.5–4.5)
PLAT MORPH BLD: NORMAL
PLATELET # BLD AUTO: 271 10*3/MM3 (ref 140–450)
PMV BLD AUTO: 9.5 FL (ref 6–12)
POIKILOCYTOSIS BLD QL SMEAR: ABNORMAL
POTASSIUM SERPL-SCNC: 3.8 MMOL/L (ref 3.5–5.2)
POTASSIUM SERPL-SCNC: 3.9 MMOL/L (ref 3.5–5.2)
RBC # BLD AUTO: 3.1 10*6/MM3 (ref 4.14–5.8)
SODIUM SERPL-SCNC: 148 MMOL/L (ref 136–145)
SODIUM SERPL-SCNC: 148 MMOL/L (ref 136–145)
SODIUM SERPL-SCNC: 152 MMOL/L (ref 136–145)
URATE SERPL-MCNC: 5.9 MG/DL (ref 3.4–7)
VARIANT LYMPHS NFR BLD MANUAL: 3 % (ref 19.6–45.3)
WBC MORPH BLD: NORMAL
WBC NRBC COR # BLD: 14.19 10*3/MM3 (ref 3.4–10.8)

## 2022-11-30 PROCEDURE — 84550 ASSAY OF BLOOD/URIC ACID: CPT | Performed by: HOSPITALIST

## 2022-11-30 PROCEDURE — 84295 ASSAY OF SERUM SODIUM: CPT | Performed by: HOSPITALIST

## 2022-11-30 PROCEDURE — 83605 ASSAY OF LACTIC ACID: CPT | Performed by: HOSPITALIST

## 2022-11-30 PROCEDURE — 85007 BL SMEAR W/DIFF WBC COUNT: CPT | Performed by: HOSPITALIST

## 2022-11-30 PROCEDURE — 99024 POSTOP FOLLOW-UP VISIT: CPT | Performed by: NURSE PRACTITIONER

## 2022-11-30 PROCEDURE — 25010000002 PIPERACILLIN SOD-TAZOBACTAM PER 1 G: Performed by: HOSPITALIST

## 2022-11-30 PROCEDURE — 85025 COMPLETE CBC W/AUTO DIFF WBC: CPT | Performed by: HOSPITALIST

## 2022-11-30 PROCEDURE — 74176 CT ABD & PELVIS W/O CONTRAST: CPT

## 2022-11-30 PROCEDURE — 80069 RENAL FUNCTION PANEL: CPT | Performed by: INTERNAL MEDICINE

## 2022-11-30 PROCEDURE — 99232 SBSQ HOSP IP/OBS MODERATE 35: CPT | Performed by: SURGERY

## 2022-11-30 PROCEDURE — 97530 THERAPEUTIC ACTIVITIES: CPT

## 2022-11-30 PROCEDURE — 80069 RENAL FUNCTION PANEL: CPT | Performed by: HOSPITALIST

## 2022-11-30 RX ORDER — DEXTROSE MONOHYDRATE 50 MG/ML
150 INJECTION, SOLUTION INTRAVENOUS CONTINUOUS
Status: ACTIVE | OUTPATIENT
Start: 2022-11-30 | End: 2022-12-02

## 2022-11-30 RX ADMIN — PANTOPRAZOLE SODIUM 40 MG: 40 INJECTION, POWDER, FOR SOLUTION INTRAVENOUS at 06:12

## 2022-11-30 RX ADMIN — BISACODYL 10 MG: 10 SUPPOSITORY RECTAL at 12:32

## 2022-11-30 RX ADMIN — DEXTROSE MONOHYDRATE 75 ML/HR: 50 INJECTION, SOLUTION INTRAVENOUS at 13:19

## 2022-11-30 RX ADMIN — TAZOBACTAM SODIUM AND PIPERACILLIN SODIUM 3.38 G: 375; 3 INJECTION, SOLUTION INTRAVENOUS at 16:16

## 2022-11-30 RX ADMIN — TAZOBACTAM SODIUM AND PIPERACILLIN SODIUM 3.38 G: 375; 3 INJECTION, SOLUTION INTRAVENOUS at 11:00

## 2022-12-01 LAB
ALBUMIN SERPL-MCNC: 2.8 G/DL (ref 3.5–5.2)
ALBUMIN/GLOB SERPL: 1.1 G/DL
ALP SERPL-CCNC: 145 U/L (ref 39–117)
ALT SERPL W P-5'-P-CCNC: 201 U/L (ref 1–41)
ANION GAP SERPL CALCULATED.3IONS-SCNC: 10 MMOL/L (ref 5–15)
ANION GAP SERPL CALCULATED.3IONS-SCNC: 9.1 MMOL/L (ref 5–15)
AST SERPL-CCNC: 233 U/L (ref 1–40)
BASOPHILS # BLD AUTO: 0.02 10*3/MM3 (ref 0–0.2)
BASOPHILS NFR BLD AUTO: 0.1 % (ref 0–1.5)
BILIRUB SERPL-MCNC: 0.5 MG/DL (ref 0–1.2)
BUN SERPL-MCNC: 32 MG/DL (ref 8–23)
BUN SERPL-MCNC: 37 MG/DL (ref 8–23)
BUN/CREAT SERPL: 30.8 (ref 7–25)
BUN/CREAT SERPL: 39.4 (ref 7–25)
CALCIUM SPEC-SCNC: 8.4 MG/DL (ref 8.6–10.5)
CALCIUM SPEC-SCNC: 8.6 MG/DL (ref 8.6–10.5)
CHLORIDE SERPL-SCNC: 117 MMOL/L (ref 98–107)
CHLORIDE SERPL-SCNC: 120 MMOL/L (ref 98–107)
CO2 SERPL-SCNC: 24 MMOL/L (ref 22–29)
CO2 SERPL-SCNC: 26.9 MMOL/L (ref 22–29)
CREAT SERPL-MCNC: 0.94 MG/DL (ref 0.76–1.27)
CREAT SERPL-MCNC: 1.04 MG/DL (ref 0.76–1.27)
DEPRECATED RDW RBC AUTO: 41.7 FL (ref 37–54)
EGFRCR SERPLBLD CKD-EPI 2021: 71.7 ML/MIN/1.73
EGFRCR SERPLBLD CKD-EPI 2021: 80.9 ML/MIN/1.73
EOSINOPHIL # BLD AUTO: 0.06 10*3/MM3 (ref 0–0.4)
EOSINOPHIL NFR BLD AUTO: 0.4 % (ref 0.3–6.2)
ERYTHROCYTE [DISTWIDTH] IN BLOOD BY AUTOMATED COUNT: 13.3 % (ref 12.3–15.4)
GLOBULIN UR ELPH-MCNC: 2.5 GM/DL
GLUCOSE SERPL-MCNC: 129 MG/DL (ref 65–99)
GLUCOSE SERPL-MCNC: 152 MG/DL (ref 65–99)
HCT VFR BLD AUTO: 27.6 % (ref 37.5–51)
HGB BLD-MCNC: 9.1 G/DL (ref 13–17.7)
IMM GRANULOCYTES # BLD AUTO: 0.09 10*3/MM3 (ref 0–0.05)
IMM GRANULOCYTES NFR BLD AUTO: 0.7 % (ref 0–0.5)
LYMPHOCYTES # BLD AUTO: 0.67 10*3/MM3 (ref 0.7–3.1)
LYMPHOCYTES NFR BLD AUTO: 4.9 % (ref 19.6–45.3)
MAGNESIUM SERPL-MCNC: 3 MG/DL (ref 1.6–2.4)
MCH RBC QN AUTO: 28.3 PG (ref 26.6–33)
MCHC RBC AUTO-ENTMCNC: 33 G/DL (ref 31.5–35.7)
MCV RBC AUTO: 85.7 FL (ref 79–97)
MONOCYTES # BLD AUTO: 0.76 10*3/MM3 (ref 0.1–0.9)
MONOCYTES NFR BLD AUTO: 5.6 % (ref 5–12)
NEUTROPHILS NFR BLD AUTO: 12.09 10*3/MM3 (ref 1.7–7)
NEUTROPHILS NFR BLD AUTO: 88.3 % (ref 42.7–76)
NRBC BLD AUTO-RTO: 0 /100 WBC (ref 0–0.2)
PHOSPHATE SERPL-MCNC: 2.7 MG/DL (ref 2.5–4.5)
PLATELET # BLD AUTO: 297 10*3/MM3 (ref 140–450)
PMV BLD AUTO: 9.3 FL (ref 6–12)
POTASSIUM SERPL-SCNC: 3.8 MMOL/L (ref 3.5–5.2)
POTASSIUM SERPL-SCNC: 3.8 MMOL/L (ref 3.5–5.2)
PROT SERPL-MCNC: 5.3 G/DL (ref 6–8.5)
RBC # BLD AUTO: 3.22 10*6/MM3 (ref 4.14–5.8)
SODIUM SERPL-SCNC: 153 MMOL/L (ref 136–145)
SODIUM SERPL-SCNC: 154 MMOL/L (ref 136–145)
WBC NRBC COR # BLD: 13.69 10*3/MM3 (ref 3.4–10.8)

## 2022-12-01 PROCEDURE — 84100 ASSAY OF PHOSPHORUS: CPT | Performed by: INTERNAL MEDICINE

## 2022-12-01 PROCEDURE — 25010000002 PIPERACILLIN SOD-TAZOBACTAM PER 1 G: Performed by: HOSPITALIST

## 2022-12-01 PROCEDURE — 99232 SBSQ HOSP IP/OBS MODERATE 35: CPT | Performed by: SURGERY

## 2022-12-01 PROCEDURE — 85025 COMPLETE CBC W/AUTO DIFF WBC: CPT | Performed by: HOSPITALIST

## 2022-12-01 PROCEDURE — 83735 ASSAY OF MAGNESIUM: CPT | Performed by: INTERNAL MEDICINE

## 2022-12-01 PROCEDURE — 80053 COMPREHEN METABOLIC PANEL: CPT | Performed by: INTERNAL MEDICINE

## 2022-12-01 PROCEDURE — 25010000002 METHYLNALTREXONE 12 MG/0.6ML SOLUTION: Performed by: SURGERY

## 2022-12-01 PROCEDURE — 99024 POSTOP FOLLOW-UP VISIT: CPT | Performed by: NURSE PRACTITIONER

## 2022-12-01 RX ADMIN — BISACODYL 10 MG: 10 SUPPOSITORY RECTAL at 11:07

## 2022-12-01 RX ADMIN — METHYLNALTREXONE BROMIDE 6 MG: 12 INJECTION, SOLUTION SUBCUTANEOUS at 11:35

## 2022-12-01 RX ADMIN — PANTOPRAZOLE SODIUM 40 MG: 40 INJECTION, POWDER, FOR SOLUTION INTRAVENOUS at 05:14

## 2022-12-01 RX ADMIN — Medication 3 ML: at 21:00

## 2022-12-01 RX ADMIN — TAZOBACTAM SODIUM AND PIPERACILLIN SODIUM 3.38 G: 375; 3 INJECTION, SOLUTION INTRAVENOUS at 17:31

## 2022-12-01 RX ADMIN — TAZOBACTAM SODIUM AND PIPERACILLIN SODIUM 3.38 G: 375; 3 INJECTION, SOLUTION INTRAVENOUS at 11:35

## 2022-12-01 RX ADMIN — Medication 3 ML: at 10:10

## 2022-12-01 RX ADMIN — DEXTROSE MONOHYDRATE 75 ML/HR: 50 INJECTION, SOLUTION INTRAVENOUS at 05:09

## 2022-12-01 NOTE — PROGRESS NOTES
Boni Hernandez   82 y.o.  male    LOS: 2 days   Patient Care Team:  Kiki Weiss APRN as PCP - General (Nurse Practitioner)  Mark Abraham MD as Consulting Physician (Cardiology)      Subjective   Patient is confused  Interval History:     Patient Complaints:     Review of Systems:       Medication Review:   Current Facility-Administered Medications:   •  acetaminophen (TYLENOL) tablet 1,000 mg, 1,000 mg, Oral, Q6H PRN, Matthew Alex MD  •  bisacodyl (DULCOLAX) suppository 10 mg, 10 mg, Rectal, Daily, Corbin Mc MD, 10 mg at 11/30/22 1232  •  dextrose (D5W) 5 % infusion, 125 mL/hr, Intravenous, Continuous, Mor Yost MD, Last Rate: 125 mL/hr at 12/01/22 1010, 125 mL/hr at 12/01/22 1010  •  methylnaltrexone (RELISTOR) injection 6 mg, 6 mg, Subcutaneous, Every Other Day, Corbin Mc MD  •  multivitamin with minerals 1 tablet, 1 tablet, Oral, Daily, Matthew Alex MD, 1 tablet at 11/29/22 0944  •  nitroglycerin (NITROSTAT) SL tablet 0.4 mg, 0.4 mg, Sublingual, Q5 Min PRN, Matthew Alex MD  •  ondansetron (ZOFRAN) tablet 4 mg, 4 mg, Oral, Q6H PRN **OR** ondansetron (ZOFRAN) injection 4 mg, 4 mg, Intravenous, Q6H PRN, Matthew Alex MD, 4 mg at 11/27/22 1104  •  pantoprazole (PROTONIX) injection 40 mg, 40 mg, Intravenous, Q AM, Mor Yost MD, 40 mg at 12/01/22 0514  •  piperacillin-tazobactam (ZOSYN) 3.375 g in iso-osmotic dextrose 50 ml (premix), 3.375 g, Intravenous, Q8H, Mor Yost MD, 3.375 g at 11/30/22 1616  •  sodium chloride 0.9 % flush 10 mL, 10 mL, Intravenous, PRN, Matthew Alex MD  •  sodium chloride 0.9 % flush 3 mL, 3 mL, Intravenous, Q12H, Matthew Alex MD, 3 mL at 12/01/22 1010  •  sodium chloride 0.9 % infusion 40 mL, 40 mL, Intravenous, PRN, Matthew Alex MD  •  tamsulosin (FLOMAX) 24 hr capsule 0.4 mg, 0.4 mg, Oral, Daily, Patrick Henry Jr., MD, 0.4 mg at 11/27/22 1005  •  traMADol (ULTRAM) tablet 50 mg, 50 mg, Oral, Q8H PRN,  Sakina Goldstein APRN, 50 mg at 11/27/22 2310      Objective   Vital Sign Min/Max for last 24 hours  Temp  Min: 97.2 °F (36.2 °C)  Max: 98.9 °F (37.2 °C)   BP  Min: 130/57  Max: 143/51    Pulse  Min: 93  Max: 99     Wt Readings from Last 3 Encounters:   11/23/22 68 kg (150 lb)   11/10/22 68.9 kg (152 lb)   10/27/22 70.3 kg (155 lb)        Intake/Output Summary (Last 24 hours) at 12/1/2022 1019  Last data filed at 12/1/2022 0900  Gross per 24 hour   Intake --   Output 2750 ml   Net -2750 ml     Physical Exam:      General Appearance:    Well developed and well nourished in no acute distress   Head:    Normocephalic, atraumatic   Eyes:            Conjunctivae normal, anicteric, no xanthelasma   Neck:   supple, trachea midline, no thyromegaly, no carotid bruit, no JVD, no elevated CVP   Lungs:     Clear to auscultation,respirations regular, even and                  unlabored    Heart:    Regular rhythm and normal rate, normal S1 and S2,            No murmur, no gallop, no rub, no click   Chest Wall:    No abnormalities observed   Abdomen:     Normal bowel sounds, no masses, no organomegaly, soft        nontender, nondistended, no guarding, no rebound                tenderness   Rectal:     Deferred   Extremities:   No edema. Moves all extremities well, no cyanosis, no erythema   Pulses:   Pulses palpable and equal bilaterally   Skin:   No bleeding, bruising or rash   Neurologic:   awake alert and oriented x3, speech clear and approp, no facial drooping     :    Monitor:      Results Review:         Sodium Sodium   Date Value Ref Range Status   11/30/2022 152 (H) 136 - 145 mmol/L Final   11/30/2022 148 (H) 136 - 145 mmol/L Final   11/30/2022 148 (H) 136 - 145 mmol/L Final   11/29/2022 139 136 - 145 mmol/L Final   11/28/2022 139 136 - 145 mmol/L Final      Potassium Potassium   Date Value Ref Range Status   11/30/2022 3.9 3.5 - 5.2 mmol/L Final   11/30/2022 3.8 3.5 - 5.2 mmol/L Final   11/29/2022 4.5 3.5 - 5.2 mmol/L  Final     Comment:     Slight hemolysis detected by analyzer. Results may be affected.   11/28/2022 4.9 3.5 - 5.2 mmol/L Final     Comment:     Slight hemolysis detected by analyzer. Results may be affected.      Chloride Chloride   Date Value Ref Range Status   11/30/2022 118 (H) 98 - 107 mmol/L Final   11/30/2022 114 (H) 98 - 107 mmol/L Final   11/29/2022 109 (H) 98 - 107 mmol/L Final   11/28/2022 106 98 - 107 mmol/L Final      Bicarbonate No results found for: PLASMABICARB   BUN BUN   Date Value Ref Range Status   11/30/2022 52 (H) 8 - 23 mg/dL Final   11/30/2022 54 (H) 8 - 23 mg/dL Final   11/29/2022 58 (H) 8 - 23 mg/dL Final   11/28/2022 62 (H) 8 - 23 mg/dL Final      Creatinine Creatinine   Date Value Ref Range Status   11/30/2022 1.07 0.76 - 1.27 mg/dL Final   11/30/2022 1.38 (H) 0.76 - 1.27 mg/dL Final   11/29/2022 1.39 (H) 0.76 - 1.27 mg/dL Final   11/28/2022 1.83 (H) 0.76 - 1.27 mg/dL Final      Calcium Calcium   Date Value Ref Range Status   11/30/2022 8.9 8.6 - 10.5 mg/dL Final   11/30/2022 8.3 (L) 8.6 - 10.5 mg/dL Final   11/29/2022 8.1 (L) 8.6 - 10.5 mg/dL Final   11/28/2022 8.0 (L) 8.6 - 10.5 mg/dL Final      Magnesium Magnesium   Date Value Ref Range Status   11/29/2022 2.8 (H) 1.6 - 2.4 mg/dL Final        Results from last 7 days   Lab Units 11/30/22  0717   WBC 10*3/mm3 14.19*   HEMOGLOBIN g/dL 8.9*   HEMATOCRIT % 27.8*   PLATELETS 10*3/mm3 271     No results found for: TROPONINT         Echo EF Estimated  No results found for: ECHOEFEST      Assessment/ Plan  Assessment & Plan   Active Hospital Problems    Diagnosis  POA   • **Lumbar spinal stenosis [M48.061]  Yes   • Spinal stenosis of lumbar region with neurogenic claudication [M48.062]  Yes   • Postoperative urinary retention [N99.89, R33.8]  No   • Postoperative ileus (HCC) [K91.89, K56.7]  No   • Leukocytosis (leucocytosis) [D72.829]  No              1. Urinary retention post-op back surgery 11/23 for spinal stenosis  Urology foll  -f/c  -h/o  bph     2.  postoperative ileus  -abdominal distention and nausea  -NGT  gen surgery foll     3. Hypertension with hypotension  - holding Coreg, Imdur, lisinopril     4. Hyponatremia/MESFIN  Renal foll- improving with ivf's     5.  Metabolic encephalopathy     6. Possible aspiration pneumonia vs pneumonitis  -XR showed LLL consolidation  -On  zosyn primary foll  -MRSA screen pending     7. hyperlipidemia     8. CAD    A.  stenting of the circumflex in 2016     B. Stenting of the LAD in March 2021     9. appropriate Sinus tachycardia     Plan  He is off DAPT however his stent was more than a year ago.  bp meds on hold ngt lws, bp ok for now  CT scan of the abdomen shows ileus and diverticulosis  To continue NG suction        Andrade Guerrero MD  12/01/22  10:19 EST

## 2022-12-01 NOTE — PROGRESS NOTES
[facility] Progress Note      Patient Name: Boni eHrnandez  : 1940  MRN: [id number]  Primary Care Physician:  Kiki Weiss APRN  Date of admission: 2022    Subjective     Interval History:   Follow up hypovolemic hyponatremia. Confused. Less abdominal pain. NG 1150 out.  Intake not recorded.      Review of Systems:   As noted above    Objective     Vitals:   Temp:  [97.1 °F (36.2 °C)-98.9 °F (37.2 °C)] 98.4 °F (36.9 °C)  Heart Rate:  [] 100  Resp:  [18] 18  BP: (130-145)/(51-65) 145/65  Flow (L/min):  [1-2] 1    Intake/Output Summary (Last 24 hours) at 2022 1539  Last data filed at 2022 0900  Gross per 24 hour   Intake --   Output 2750 ml   Net -2750 ml       Physical Exam:    Constitutional: more alert, confused. Oriented to self, not place or situation. [place].  chronically ill. In restraints.  HEENT: Sclera anicteric.   oral mucosa dry, edentulous. +NGT   Neck:  no JVD  Heart : RRR, no s3  Lungs coarse bs , few rales RUL anteriorly  Abd: BS absent, soft, +tender. No guarding.  ++ distended  : Dale catheter   Ext: No edema.  Neurologic: moving all extremities.  Speech clearer  Skin: Warm and dry      Scheduled Meds:     bisacodyl, 10 mg, Rectal, Daily  methylnaltrexone, 6 mg, Subcutaneous, Every Other Day  multivitamin with minerals, 1 tablet, Oral, Daily  pantoprazole, 40 mg, Intravenous, Q AM  piperacillin-tazobactam, 3.375 g, Intravenous, Q8H  sodium chloride, 3 mL, Intravenous, Q12H  tamsulosin, 0.4 mg, Oral, Daily      IV Meds:   dextrose, 150 mL/hr, Last Rate: 150 mL/hr (22 1340)        Results Reviewed:   I have personally reviewed the results from the time of this admission to 2022 15:39 EST     Results from last 7 days   Lab Units 22  1111 22  1745 22  0717 22  0644   SODIUM mmol/L 154* 152* 148*  148* 139   POTASSIUM mmol/L 3.8 3.9 3.8 4.5   CHLORIDE mmol/L 120* 118* 114* 109*   CO2 mmol/L 24.0 22.4 23.0 19.0*    BUN mg/dL 37* 52* 54* 58*   CREATININE mg/dL 0.94 1.07 1.38* 1.39*   CALCIUM mg/dL 8.6 8.9 8.3* 8.1*   BILIRUBIN mg/dL 0.5  --   --  0.5   ALK PHOS U/L 145*  --   --  88   ALT (SGPT) U/L 201*  --   --  77*   AST (SGOT) U/L 233*  --   --  102*   GLUCOSE mg/dL 152* 107* 102* 109*       Estimated Creatinine Clearance: 58.3 mL/min (by C-G formula based on SCr of 0.94 mg/dL).    Results from last 7 days   Lab Units 12/01/22  1111 11/30/22  1745 11/30/22  0717 11/29/22  0644 11/28/22  1624 11/28/22 0644   MAGNESIUM mg/dL 3.0*  --   --  2.8*  --  2.3   PHOSPHORUS mg/dL 2.7 3.3 3.9  --    < > 2.8    < > = values in this interval not displayed.       Results from last 7 days   Lab Units 11/30/22  0717 11/28/22  0644 11/27/22  0431   URIC ACID mg/dL 5.9 5.5 4.4       Results from last 7 days   Lab Units 12/01/22  1111 11/30/22  0717 11/29/22  0644 11/28/22  0644 11/27/22  0431   WBC 10*3/mm3 13.69* 14.19* 13.85* 16.66* 15.11*   HEMOGLOBIN g/dL 9.1* 8.9* 9.5* 10.3* 11.3*   PLATELETS 10*3/mm3 297 271 246 254 237             Assessment / Plan     ASSESSMENT:  1.  Hypovolemic hyponatremia, acute on chronic.  More hypernatremic by labs this am.  Recheck now.  IVF D5W now going at 150 cc per hour.  Recheck lab STAT .  Asked RN to call lab .   2.  MESFIN, creatinine stable. : likely prerenal from volume contraction, hypotension, and abdominal process. NAGMA improved.   3.  Lumbar spinal stenosis s/p laminectomy and facetectomy on 11/23  4.  Postoperative ileus: NG tube in place  5.  Hypertension, controlled.    6.  Urinary retention, s/p Dale catheter  7.  Anemia hg stable today with hydration.   8. Elevated transaminases.   PLAN:  1. STAT bmp.    2. RN to call with results.   Neida Webb MD  12/01/22  15:39 EST    [facility]  [phone]

## 2022-12-01 NOTE — PROGRESS NOTES
[facility] PROGRESS NOTE      CC: POD 8 L3/4 laminectomy and neurolysis L4 and fusion with connection to previous instrumentation L4-5      Subjective     Interval History: had CT A/P yesterday-Dr. Mc reviewed per his note 11/30. Small BM yesterday per nurse. No vomiting. NGT still in place. Denies back or leg pain. Some confusion    ROS:  Constitutional: No fever, chills  GI: No nausea, vomiting; abdominal pain.  Constipation  MS: No back pain  Neuro: No numbness, tingling, or weakness  : Has Dale    Objective     Vital signs in last 24 hours:  Temp:  [97.1 °F (36.2 °C)-98.6 °F (37 °C)] 98.4 °F (36.9 °C)  Heart Rate:  [] 100  Resp:  [18] 18  BP: (130-145)/(51-65) 145/65    Intake/Output this shift:  I/O this shift:  In: -   Out: 600 [Urine:600]    LABS:  Results from last 7 days   Lab Units 12/01/22  1111 11/30/22  0717 11/29/22  0644   WBC 10*3/mm3 13.69* 14.19* 13.85*   HEMOGLOBIN g/dL 9.1* 8.9* 9.5*   HEMATOCRIT % 27.6* 27.8* 29.9*   PLATELETS 10*3/mm3 297 271 246     Results from last 7 days   Lab Units 12/01/22  1111 11/30/22  1745 11/30/22  0717 11/29/22  0644   SODIUM mmol/L 154* 152* 148*  148* 139   POTASSIUM mmol/L 3.8 3.9 3.8 4.5   CHLORIDE mmol/L 120* 118* 114* 109*   CO2 mmol/L 24.0 22.4 23.0 19.0*   BUN mg/dL 37* 52* 54* 58*   CREATININE mg/dL 0.94 1.07 1.38* 1.39*   CALCIUM mg/dL 8.6 8.9 8.3* 8.1*   BILIRUBIN mg/dL 0.5  --   --  0.5   ALK PHOS U/L 145*  --   --  88   ALT (SGPT) U/L 201*  --   --  77*   AST (SGOT) U/L 233*  --   --  102*   GLUCOSE mg/dL 152* 107* 102* 109*     Urine cx 11/28- NG    Lactate 11/30-1.2    IMAGING STUDIES:  CT Abdomen Pelvis Without Contrast    Result Date: 11/30/2022  Diffuse dilatation of the small bowel with mild circumferential wall thickening seen within the last 5 to 10 cm of the terminal ileum. Right colon is significantly distended up to 9.6 cm in diameter. This gradually transitions to normal caliber distally most suggestive of an ileus.   The wall thickening is demonstrated within the most distal terminal ileum could be inflammatory infectious or ischemic in etiology. Correlation with serum lactate is recommended. Small ascites is present.  Radiation dose reduction techniques were utilized, including automated exposure control and exposure modulation based on body size.         I personally viewed and interpreted the patient's chart.    Meds reviewed/changed: Yes    Current Facility-Administered Medications:   •  acetaminophen (TYLENOL) tablet 1,000 mg, 1,000 mg, Oral, Q6H PRN, Matthew Alex MD  •  bisacodyl (DULCOLAX) suppository 10 mg, 10 mg, Rectal, Daily, Corbin Mc MD, 10 mg at 12/01/22 1107  •  dextrose (D5W) 5 % infusion, 150 mL/hr, Intravenous, Continuous, Neida Webb MD, Last Rate: 150 mL/hr at 12/01/22 1340, 150 mL/hr at 12/01/22 1340  •  methylnaltrexone (RELISTOR) injection 6 mg, 6 mg, Subcutaneous, Every Other Day, Corbin Mc MD, 6 mg at 12/01/22 1135  •  multivitamin with minerals 1 tablet, 1 tablet, Oral, Daily, Matthew Alex MD, 1 tablet at 11/29/22 0944  •  nitroglycerin (NITROSTAT) SL tablet 0.4 mg, 0.4 mg, Sublingual, Q5 Min PRN, Matthew Alex MD  •  ondansetron (ZOFRAN) tablet 4 mg, 4 mg, Oral, Q6H PRN **OR** ondansetron (ZOFRAN) injection 4 mg, 4 mg, Intravenous, Q6H PRN, Matthew Alex MD, 4 mg at 11/27/22 1104  •  pantoprazole (PROTONIX) injection 40 mg, 40 mg, Intravenous, Q AM, Mor Yost MD, 40 mg at 12/01/22 0514  •  piperacillin-tazobactam (ZOSYN) 3.375 g in iso-osmotic dextrose 50 ml (premix), 3.375 g, Intravenous, Q8H, Mor Yost MD, 3.375 g at 12/01/22 1135  •  sodium chloride 0.9 % flush 10 mL, 10 mL, Intravenous, PRN, Matthew Alex MD  •  sodium chloride 0.9 % flush 3 mL, 3 mL, Intravenous, Q12H, Matthew Alex MD, 3 mL at 12/01/22 1010  •  sodium chloride 0.9 % infusion 40 mL, 40 mL, Intravenous, PRN, Matthew Alex MD  •  tamsulosin  (FLOMAX) 24 hr capsule 0.4 mg, 0.4 mg, Oral, Daily, Patrick Henry Jr., MD, 0.4 mg at 11/27/22 1005  •  traMADol (ULTRAM) tablet 50 mg, 50 mg, Oral, Q8H PRN, Sakina Goldstein, APRN, 50 mg at 11/27/22 2310      Physical Exam:    General:   Awake, alert, oriented x2.  Mildly confused but pleasant and cooperative.  Back:    no brace. Incision midline lumbar with CDI dressing  Abdomen:   Bowel sounds x4 normal, abdomen soft, flat, but moderately tender  Motor: Normal muscle strength, bulk and tone in lower extremities.   Sensation: Normal to light touch  Station and Gait:             Per PT note 11/30-agreeable to try to sit up on EOB with assistance but very resistive to log rolling, required modAx2 to get to EOB. Patient able to stand x 2 trials at the bedside with modAx2, stands for brief period and then abruptly returns to sitting on EOB.   Extremities:   Wearing SCD      Assessment & Plan     ASSESSMENT:      Lumbar spinal stenosis    Postoperative urinary retention    Postoperative ileus (HCC)    Leukocytosis (leucocytosis)    Spinal stenosis of lumbar region with neurogenic claudication    Patient now postop day 8 from 1 level lumbar decompression fusion.  He has had multiple medical issues following surgery including hyponatremia, acute kidney injury, confusion/encephalopathy, ileus, urinary retention, pneumonia versus pneumonitis.  Currently on Zosyn.  Dale catheter in place as well as NG tube.  Hospitalist service, nephrology, neurosurgery, and cardiology following.  Greatly appreciate their assistance with care of patient.  He seems somewhat better today.  Awake and alert although still slightly confused.  He has good strength in his legs.  His abdomen is soft with good bowel sounds but does still have some tenderness.  Lactate normal and white blood cell trending down.  He is afebrile.  Incision site looks okay.  We will follow the lead of the consultants with regard to his medical issues.  Hopefully can  "begin more activity as tolerated and begin moving towards discharge to rehab.  Continue to follow daily CBCs.  We will plan for voiding trial closer to discharge.    PLAN:   Cont OOB activites/therapy as tolerated- no brace needed  Will need rehab at DC- CCP following  Appreciate LHA/cards/gen surgery/nephrology assistance with patient  CBC daily  Cont banda until more alert-VT early in day prior to DC per urology recs    I discussed the patient's findings and my recommendations with patient, nursing staff and Dr. Alex       LOS: 2 days       Sheila Tovar, APRN  12/1/2022  16:56 EST    \"Dictated utilizing Dragon dictation\".      "

## 2022-12-01 NOTE — PROGRESS NOTES
CC: Abdominal pain, ileus    S: Patient will be more awake.  Not having any bowel function.  Less abdominal pain    O:   Vitals:    12/01/22 0400 12/01/22 0700 12/01/22 1100 12/01/22 1443   BP: 143/51 130/57 130/57 145/65   BP Location: Right arm Right arm Right arm Right arm   Patient Position: Lying Lying Lying Lying   Pulse: 99 94 102 100   Resp: 18      Temp: 97.6 °F (36.4 °C) 98.1 °F (36.7 °C) 97.1 °F (36.2 °C) 98.4 °F (36.9 °C)   TempSrc: Oral Oral Oral Oral   SpO2: 93% 93% 96% 100%   Weight:       Height:         NG 1.1 L  Alert, no acute distress  NG tube in place with bilious fluid  Abdomen soft, moderately tender mostly in the lower abdomen.  No rebound or guarding no peritoneal sign    Blood cell count 13 from 14, hemoglobin 9.1, platelets stable, elevation of the LFTs in the 200s, alk phos 145, sodium 154, chloride 120    Assessment and plan    82-year-old male s/p spine surgery developed a postoperative ileus.  Patient with abdominal pain and findings on CT scan concerning for bowel ischemia.  Clinically he is getting better.  He is not acidotic.  He has less pain.  We will continue conservative management with nasogastric tube decompression and hydration.    Will continue to follow

## 2022-12-01 NOTE — PLAN OF CARE
Goal Outcome Evaluation:  Plan of Care Reviewed With: patient           Outcome Evaluation: Pt confused, VSS, 2 LNC, patient had to be repositioned several times due to hanging legs out of bed. Patient does not complain of pain. Patient still in restraints. No questions or concerns. Will continue to monitor.

## 2022-12-01 NOTE — PROGRESS NOTES
Ded[facility] to Hospital Care    [phone]   LOS: 2 days     Name: Boni Hernandez  Age/Sex: 82 y.o. male  :  1940        PCP: Kiki Weiss APRN  No chief complaint on file.     Subjective   Remains confused but more awake and less lethargic each day.  Denies any pain currently but on exam still pretty tender.    bisacodyl, 10 mg, Rectal, Daily  methylnaltrexone, 6 mg, Subcutaneous, Every Other Day  multivitamin with minerals, 1 tablet, Oral, Daily  pantoprazole, 40 mg, Intravenous, Q AM  piperacillin-tazobactam, 3.375 g, Intravenous, Q8H  sodium chloride, 3 mL, Intravenous, Q12H  tamsulosin, 0.4 mg, Oral, Daily      dextrose, 150 mL/hr, Last Rate: 150 mL/hr (22 1340)        Objective   Vital Signs  Temp:  [97.1 °F (36.2 °C)-98.9 °F (37.2 °C)] 97.1 °F (36.2 °C)  Heart Rate:  [] 102  Resp:  [18] 18  BP: (130-143)/(51-61) 130/57  Body mass index is 23.49 kg/m².    Intake/Output Summary (Last 24 hours) at 2022 1431  Last data filed at 2022 0900  Gross per 24 hour   Intake --   Output 2750 ml   Net -2750 ml       Physical Exam  Constitutional:       General: He is not in acute distress.     Appearance: He is ill-appearing.   Cardiovascular:      Rate and Rhythm: Regular rhythm. Tachycardia present.   Pulmonary:      Effort: Pulmonary effort is normal. No respiratory distress.   Abdominal:      General: There is distension.      Palpations: Abdomen is soft.      Tenderness: There is abdominal tenderness.      Comments: Belly is soft remains tender throughout buy more tender on the right side this AM   Neurological:      General: No focal deficit present.      Comments: Lethargic but improved compared to yesterday           Results Review:       I reviewed the patient's new clinical results.  Results from last 7 days   Lab Units 22  1111 22  0717 22  0644 22  0644 22  0431 22  0411   WBC 10*3/mm3 13.69* 14.19* 13.85* 16.66* 15.11* 9.82   HEMOGLOBIN g/dL 9.1*  8.9* 9.5* 10.3* 11.3* 10.0*   PLATELETS 10*3/mm3 297 271 246 254 237 172     Results from last 7 days   Lab Units 12/01/22  1111 11/30/22  1745 11/30/22  0717 11/29/22  0644 11/28/22  1624 11/28/22  0644 11/27/22  1540 11/27/22  0431   SODIUM mmol/L 154* 152* 148*  148* 139 139 133* 131* 128*   POTASSIUM mmol/L 3.8 3.9 3.8 4.5 4.9 4.8 4.6 4.0   CHLORIDE mmol/L 120* 118* 114* 109* 106 100 95* 94*   CO2 mmol/L 24.0 22.4 23.0 19.0* 21.0* 22.1 24.3 25.0   BUN mg/dL 37* 52* 54* 58* 62* 59* 36* 20   CREATININE mg/dL 0.94 1.07 1.38* 1.39* 1.83* 1.79* 1.02 0.77   CALCIUM mg/dL 8.6 8.9 8.3* 8.1* 8.0* 7.9* 8.5* 8.8   MAGNESIUM mg/dL 3.0*  --   --  2.8*  --  2.3  --   --    PHOSPHORUS mg/dL 2.7 3.3 3.9  --  3.6 2.8 3.1 3.1   Estimated Creatinine Clearance: 58.3 mL/min (by C-G formula based on SCr of 0.94 mg/dL).      Assessment & Plan   Active Hospital Problems    Diagnosis  POA   • **Lumbar spinal stenosis [M48.061]  Yes   • Spinal stenosis of lumbar region with neurogenic claudication [M48.062]  Yes   • Postoperative urinary retention [N99.89, R33.8]  No   • Postoperative ileus (HCC) [K91.89, K56.7]  No   • Leukocytosis (leucocytosis) [D72.829]  No      Resolved Hospital Problems    Diagnosis Date Resolved POA   • Hyponatremia [E87.1] 11/30/2022 Yes       PLAN  This is an 82-year-old gentleman who presented to the hospital with a history of lumbar spine stenosis and medical comorbidities including BPH, hypertension and coronary artery disease he underwent surgical correction and is postoperative day 3.  [facility] consulted for hypertension management.    1.  Hypertension  -Blood pressure has stabilized and improved remains off medications  -Continue to hold Coreg, Imdur, lisinopril (DC'd for now can add back 1 at a time), NPO with no oral meds at this time  -HR improving today  -restart BB as tolerates PO and BP allows, Cardiology now following  2.  Hyponatremia  -nephrology consulted by primary and they are now  following  -Sodium had improved with fluids but became hypernatremic yesterday.  Started on D5.  Hypernatremia worse this morning and I increased the rate on the D5.  Nephrology is following, I did touch base with Dr. Webb and I am checking a BMP now.  3.  Ileus  -General surgery is following  -Await return of bowel function  -CT reviewed   4.  Urinary retention  -Tamsulosin initiated (on hold while NPO)  -Urology folowing, FC placed  -UC negative  5.  MESFIN  -Likely prerenal given copious output from the NG tube  -Creatinine approaching baseline  -recheck labs in AM  6.  Possible aspiration pneumonia vs pneumonitis  -XR showed LLL consolidation  -Continue zosyn given increasing WBC and tachycardia although these are somewhat non specific. D 4/5  -MRSA screen negative can stop vancomycin form my perspective  7.  Lumbar spine stenosis  -Defer postoperative care to primary team  8.  Metabolic encephalopathy  -Remains in restraints    -Mechanical DVT prophylaxis defer need for pharmacologic prophylaxis to the primary team  -Full code      Disposition  Per primary      Mor Yost MD  [facility]  12/01/22  14:31 EST

## 2022-12-02 ENCOUNTER — APPOINTMENT (OUTPATIENT)
Dept: GENERAL RADIOLOGY | Facility: HOSPITAL | Age: 82
End: 2022-12-02

## 2022-12-02 PROBLEM — E43 SEVERE MALNUTRITION (HCC): Status: ACTIVE | Noted: 2022-12-02

## 2022-12-02 LAB
ALBUMIN SERPL-MCNC: 2.6 G/DL (ref 3.5–5.2)
ALBUMIN/GLOB SERPL: 0.9 G/DL
ALP SERPL-CCNC: 140 U/L (ref 39–117)
ALT SERPL W P-5'-P-CCNC: 152 U/L (ref 1–41)
ANION GAP SERPL CALCULATED.3IONS-SCNC: 10.5 MMOL/L (ref 5–15)
AST SERPL-CCNC: 134 U/L (ref 1–40)
BASOPHILS # BLD AUTO: 0.02 10*3/MM3 (ref 0–0.2)
BASOPHILS NFR BLD AUTO: 0.2 % (ref 0–1.5)
BILIRUB SERPL-MCNC: 0.6 MG/DL (ref 0–1.2)
BUN SERPL-MCNC: 27 MG/DL (ref 8–23)
BUN/CREAT SERPL: 30.7 (ref 7–25)
CALCIUM SPEC-SCNC: 8.3 MG/DL (ref 8.6–10.5)
CHLORIDE SERPL-SCNC: 116 MMOL/L (ref 98–107)
CO2 SERPL-SCNC: 26.5 MMOL/L (ref 22–29)
CREAT SERPL-MCNC: 0.88 MG/DL (ref 0.76–1.27)
DEPRECATED RDW RBC AUTO: 45.1 FL (ref 37–54)
EGFRCR SERPLBLD CKD-EPI 2021: 85.9 ML/MIN/1.73
EOSINOPHIL # BLD AUTO: 0.15 10*3/MM3 (ref 0–0.4)
EOSINOPHIL NFR BLD AUTO: 1.6 % (ref 0.3–6.2)
ERYTHROCYTE [DISTWIDTH] IN BLOOD BY AUTOMATED COUNT: 13.4 % (ref 12.3–15.4)
GLOBULIN UR ELPH-MCNC: 2.9 GM/DL
GLUCOSE SERPL-MCNC: 177 MG/DL (ref 65–99)
HCT VFR BLD AUTO: 28.8 % (ref 37.5–51)
HGB BLD-MCNC: 9 G/DL (ref 13–17.7)
IMM GRANULOCYTES # BLD AUTO: 0.07 10*3/MM3 (ref 0–0.05)
IMM GRANULOCYTES NFR BLD AUTO: 0.8 % (ref 0–0.5)
LYMPHOCYTES # BLD AUTO: 0.86 10*3/MM3 (ref 0.7–3.1)
LYMPHOCYTES NFR BLD AUTO: 9.3 % (ref 19.6–45.3)
MAGNESIUM SERPL-MCNC: 2.7 MG/DL (ref 1.6–2.4)
MCH RBC QN AUTO: 28.4 PG (ref 26.6–33)
MCHC RBC AUTO-ENTMCNC: 31.3 G/DL (ref 31.5–35.7)
MCV RBC AUTO: 90.9 FL (ref 79–97)
MONOCYTES # BLD AUTO: 0.69 10*3/MM3 (ref 0.1–0.9)
MONOCYTES NFR BLD AUTO: 7.5 % (ref 5–12)
NEUTROPHILS NFR BLD AUTO: 7.47 10*3/MM3 (ref 1.7–7)
NEUTROPHILS NFR BLD AUTO: 80.6 % (ref 42.7–76)
NRBC BLD AUTO-RTO: 0.1 /100 WBC (ref 0–0.2)
PHOSPHATE SERPL-MCNC: 2.4 MG/DL (ref 2.5–4.5)
PLATELET # BLD AUTO: 285 10*3/MM3 (ref 140–450)
PMV BLD AUTO: 9.6 FL (ref 6–12)
POTASSIUM SERPL-SCNC: 3.5 MMOL/L (ref 3.5–5.2)
PROT SERPL-MCNC: 5.5 G/DL (ref 6–8.5)
RBC # BLD AUTO: 3.17 10*6/MM3 (ref 4.14–5.8)
SODIUM SERPL-SCNC: 153 MMOL/L (ref 136–145)
WBC NRBC COR # BLD: 9.26 10*3/MM3 (ref 3.4–10.8)

## 2022-12-02 PROCEDURE — 99024 POSTOP FOLLOW-UP VISIT: CPT | Performed by: NURSE PRACTITIONER

## 2022-12-02 PROCEDURE — 97530 THERAPEUTIC ACTIVITIES: CPT

## 2022-12-02 PROCEDURE — 80053 COMPREHEN METABOLIC PANEL: CPT | Performed by: INTERNAL MEDICINE

## 2022-12-02 PROCEDURE — 85025 COMPLETE CBC W/AUTO DIFF WBC: CPT | Performed by: HOSPITALIST

## 2022-12-02 PROCEDURE — C1751 CATH, INF, PER/CENT/MIDLINE: HCPCS

## 2022-12-02 PROCEDURE — 25010000002 POTASSIUM CHLORIDE PER 2 MEQ OF POTASSIUM: Performed by: HOSPITALIST

## 2022-12-02 PROCEDURE — 25010000002 ENOXAPARIN PER 10 MG: Performed by: NURSE PRACTITIONER

## 2022-12-02 PROCEDURE — 74018 RADEX ABDOMEN 1 VIEW: CPT

## 2022-12-02 PROCEDURE — 25010000002 CALCIUM GLUCONATE PER 10 ML: Performed by: HOSPITALIST

## 2022-12-02 PROCEDURE — 99232 SBSQ HOSP IP/OBS MODERATE 35: CPT | Performed by: SURGERY

## 2022-12-02 PROCEDURE — 84100 ASSAY OF PHOSPHORUS: CPT | Performed by: INTERNAL MEDICINE

## 2022-12-02 PROCEDURE — 3E0336Z INTRODUCTION OF NUTRITIONAL SUBSTANCE INTO PERIPHERAL VEIN, PERCUTANEOUS APPROACH: ICD-10-PCS | Performed by: HOSPITALIST

## 2022-12-02 PROCEDURE — 25010000002 PIPERACILLIN SOD-TAZOBACTAM PER 1 G: Performed by: HOSPITALIST

## 2022-12-02 PROCEDURE — 83735 ASSAY OF MAGNESIUM: CPT | Performed by: INTERNAL MEDICINE

## 2022-12-02 RX ORDER — SODIUM CHLORIDE 0.9 % (FLUSH) 0.9 %
10 SYRINGE (ML) INJECTION AS NEEDED
Status: DISCONTINUED | OUTPATIENT
Start: 2022-12-02 | End: 2022-12-13 | Stop reason: HOSPADM

## 2022-12-02 RX ORDER — SODIUM CHLORIDE 0.9 % (FLUSH) 0.9 %
10 SYRINGE (ML) INJECTION EVERY 12 HOURS SCHEDULED
Status: DISCONTINUED | OUTPATIENT
Start: 2022-12-02 | End: 2022-12-13 | Stop reason: HOSPADM

## 2022-12-02 RX ORDER — SODIUM CHLORIDE 0.9 % (FLUSH) 0.9 %
20 SYRINGE (ML) INJECTION AS NEEDED
Status: DISCONTINUED | OUTPATIENT
Start: 2022-12-02 | End: 2022-12-13 | Stop reason: HOSPADM

## 2022-12-02 RX ORDER — ENOXAPARIN SODIUM 100 MG/ML
40 INJECTION SUBCUTANEOUS EVERY 24 HOURS
Status: DISCONTINUED | OUTPATIENT
Start: 2022-12-02 | End: 2022-12-13 | Stop reason: HOSPADM

## 2022-12-02 RX ADMIN — Medication 3 ML: at 09:35

## 2022-12-02 RX ADMIN — Medication 10 ML: at 22:39

## 2022-12-02 RX ADMIN — TAZOBACTAM SODIUM AND PIPERACILLIN SODIUM 3.38 G: 375; 3 INJECTION, SOLUTION INTRAVENOUS at 16:09

## 2022-12-02 RX ADMIN — TAZOBACTAM SODIUM AND PIPERACILLIN SODIUM 3.38 G: 375; 3 INJECTION, SOLUTION INTRAVENOUS at 09:35

## 2022-12-02 RX ADMIN — PANTOPRAZOLE SODIUM 40 MG: 40 INJECTION, POWDER, FOR SOLUTION INTRAVENOUS at 06:37

## 2022-12-02 RX ADMIN — CALCIUM GLUCONATE: 98 INJECTION, SOLUTION INTRAVENOUS at 18:21

## 2022-12-02 RX ADMIN — ENOXAPARIN SODIUM 40 MG: 100 INJECTION SUBCUTANEOUS at 19:40

## 2022-12-02 RX ADMIN — BISACODYL 10 MG: 10 SUPPOSITORY RECTAL at 09:35

## 2022-12-02 RX ADMIN — I.V. FAT EMULSION 20 G: 20 EMULSION INTRAVENOUS at 18:20

## 2022-12-02 RX ADMIN — Medication 3 ML: at 22:40

## 2022-12-02 RX ADMIN — DEXTROSE MONOHYDRATE 150 ML/HR: 50 INJECTION, SOLUTION INTRAVENOUS at 09:34

## 2022-12-02 RX ADMIN — TAZOBACTAM SODIUM AND PIPERACILLIN SODIUM 3.38 G: 375; 3 INJECTION, SOLUTION INTRAVENOUS at 01:00

## 2022-12-02 NOTE — PLAN OF CARE
Goal Outcome Evaluation:  Plan of Care Reviewed With: patient           Outcome Evaluation: Patient awake and alert, more oriented today, confused but able to be redirected, still in restraints. Patient performed bed mobility with Aaron and verbal cues, performed sit to stand tsf with modAx2, able to achieve full standing but unable to weight shift to take any steps. Patient with NG tube to suction, vocal quality changed once sitting on EOB, nsg notified and came to room to assess patient. Dale cath also leaking and nsg aware. Patient returned to supine, anticipate need for SNU at d/c.

## 2022-12-02 NOTE — THERAPY TREATMENT NOTE
Patient Name: Boni Hernandez  : 1940    MRN: [id number]                              Today's Date: 2022       Admit Date: 2022    Visit Dx:     ICD-10-CM ICD-9-CM   1. Spinal stenosis of lumbar region with neurogenic claudication  M48.062 724.03     Patient Active Problem List   Diagnosis   • Lumbar spinal stenosis   • DDD (degenerative disc disease), lumbar   • Spondylolisthesis of lumbar region   • Hyperlipidemia   • HTN (hypertension)   • Coronary artery disease   • BPH (benign prostatic hyperplasia)   • GERD (gastroesophageal reflux disease)   • Constipation   • Postoperative urinary retention   • Postoperative ileus (HCC)   • Leukocytosis (leucocytosis)   • Spinal stenosis of lumbar region with neurogenic claudication     Past Medical History:   Diagnosis Date   • BPH (benign prostatic hyperplasia)    • Cataract    • Coronary artery disease    • DDD (degenerative disc disease), lumbar    • GERD (gastroesophageal reflux disease)    • History of MI (myocardial infarction)     APPROX. 2 YEARS AGO, HAS STENT, FOLLOWED BY DR LOREDO, 10/21/19 STRESS TEST AT [facility]   • [place] (hard of hearing)    • HTN (hypertension)    • Hyperlipidemia    • Injury of back    • Low back pain     RADIATES DOWN BOTH LEGS   • Spinal stenosis      Past Surgical History:   Procedure Laterality Date   • CAROTID ENDARTERECTOMY Left 2017   • CATARACT EXTRACTION WITH INTRAOCULAR LENS IMPLANT Bilateral    • COLONOSCOPY     • CORONARY ANGIOPLASTY WITH STENT PLACEMENT  2016   • DENTAL PROCEDURE      DENTAL IMPLANTS   • EPIDURAL BLOCK     • LUMBAR DISCECTOMY FUSION INSTRUMENTATION N/A 2019    Procedure: Lumbar 4 to lumbar 5 laminectomy with a posterior lateral fusion and instrumentation;  Surgeon: Matthew Alex MD;  Location: [facility];  Service: Neurosurgery   • LUMBAR FUSION N/A 2022    Procedure: Lumbar 3 to lumbar 4 laminectomy with fusion and instrumentation and removal of previous instrumentation at  lumbar 4 to lumbar 5;  Surgeon: Matthew Alex MD;  Location: [facility];  Service: [employer] - Neuro;  Laterality: N/A;   • OTHER SURGICAL HISTORY  2015    PT STATES HE HAS HAD STENTS PUT IN BOTH LEGS.    • TONSILLECTOMY        General Information     Row Name 12/02/22 1325          OT Time and Intention    Document Type therapy note (daily note)  -     Mode of Treatment occupational therapy;physical therapy;co-treatment  2/2 poor cognitive status and activity tolerance limiting.  -     Row Name 12/02/22 1325          General Information    Patient Profile Reviewed yes  -     Existing Precautions/Restrictions fall;spinal;NPO  NG tube to suction  -     Row Name 12/02/22 1325          Cognition    Orientation Status (Cognition) oriented to;person;place  -     Row Name 12/02/22 1325          Safety Issues, Functional Mobility    Safety Issues Affecting Function (Mobility) insight into deficits/self-awareness;ability to follow commands;awareness of need for assistance;impulsivity;safety precaution awareness;judgment  -     Impairments Affecting Function (Mobility) balance;cognition;strength;pain;postural/trunk control;endurance/activity tolerance  -           User Key  (r) = Recorded By, (t) = Taken By, (c) = Cosigned By    Initials Name Provider Type     Mar Saba OT Occupational Therapist                 Mobility/ADL's     Row Name 12/02/22 1326          Bed Mobility    Supine-Sit Silva (Bed Mobility) moderate assist (50% patient effort);2 person assist;verbal cues  -     Sit-Supine Silva (Bed Mobility) moderate assist (50% patient effort);2 person assist;verbal cues  -     Assistive Device (Bed Mobility) head of bed elevated  -     Row Name 12/02/22 1326          Sit-Stand Transfer    Sit-Stand Silva (Transfers) moderate assist (50% patient effort);minimum assist (75% patient effort);2 person assist;verbal cues  -     Assistive Device (Sit-Stand Transfers)  "--  HHAX2  -     Row Name 12/02/22 1326          Grooming Assessment/Training    [place] Level (Grooming) grooming skills;oral care regimen;standby assist  with swab  -     Position (Grooming) sitting up in bed  -     Row Name 12/02/22 1326          Upper Body Dressing Assessment/Training    [place] Level (Upper Body Dressing) upper body dressing skills;don  min A  -SM     Position (Upper Body Dressing) edge of bed sitting  -     Row Name 12/02/22 1326          Lower Body Dressing Assessment/Training    [place] Level (Lower Body Dressing) lower body dressing skills;don;socks;dependent (less than 25% patient effort)  -     Position (Lower Body Dressing) edge of bed sitting  -           User Key  (r) = Recorded By, (t) = Taken By, (c) = Cosigned By    Initials Name Provider Type    Mar Henriquez OT Occupational Therapist               Obj/Interventions     Row Name 12/02/22 1327          Balance    Static Sitting Balance contact guard  -     Dynamic Sitting Balance minimal assist  -     Position, Sitting Balance sitting edge of bed  -     Static Standing Balance minimal assist;2-person assist  -     Position/Device Used, Standing Balance supported;walker, rolling  -           User Key  (r) = Recorded By, (t) = Taken By, (c) = Cosigned By    Initials Name Provider Type    Mar Henriquez OT Occupational Therapist               Goals/Plan    No documentation.                Clinical Impression     Row Name 12/02/22 1327          Pain Assessment    Pre/Posttreatment Pain Comment pt reports \"hurts all over\"  -     Pain Intervention(s) Repositioned;Ambulation/increased activity  -     Row Name 12/02/22 1327          Plan of Care Review    Plan of Care Reviewed With patient  -     Outcome Evaluation Pt able to participate in OT today, he remains confused but does state he knows he is in the hospital today. At times less impulsive/more calm but still needs constant " supervision/hands on assistance for pt pulling at lines during therapy. He is able to sit EOB today and 1 sit to stand before fatiqueing. He is able to participate in a few bADLs but mostly dependent for all LB ADLs/toileting. Also RN notifed and present during encounter as pt has very slurred and nasally speech while sitting EOB than has been worse than when pt was talking to therapists while in bed as well as banda catheter leaking. Pt will continue to benefit from OT as he presents below baseline with all ADLs and mobility. Anticipate SNF at [place].  -     Row Name 12/02/22 1327          Therapy Plan Review/Discharge Plan (OT)    Anticipated Discharge Disposition (OT) skilled nursing facility;sub acute care setting  -     Row Name 12/02/22 1327          Positioning and Restraints    Pre-Treatment Position in bed  -SM     Post Treatment Position bed  -SM     In Bed fowlers;call light within reach;encouraged to call for assist;exit alarm on;notified nsg  -     Restraints reapplied:;soft limb  -           User Key  (r) = Recorded By, (t) = Taken By, (c) = Cosigned By    Initials Name Provider Type    Mar Henriquez OT Occupational Therapist               Outcome Measures     Row Name 12/02/22 1331          How much help from another is currently needed...    Putting on and taking off regular lower body clothing? 1  -SM     Bathing (including washing, rinsing, and drying) 2  -SM     Toileting (which includes using toilet bed pan or urinal) 1  -SM     Putting on and taking off regular upper body clothing 3  -SM     Taking care of personal grooming (such as brushing teeth) 3  -SM     Eating meals 1  -SM     AM-PAC 6 Clicks Score (OT) 11  -     Row Name 12/02/22 1331          Functional Assessment    Outcome Measure Options AM-PAC 6 Clicks Daily Activity (OT)  -SM           User Key  (r) = Recorded By, (t) = Taken By, (c) = Cosigned By    Initials Name Provider Type    Mar Henriquez OT Occupational  Therapist                Occupational Therapy Education     Title: PT OT SLP Therapies (In Progress)     Topic: Occupational Therapy (Done)     Point: ADL training (Done)     Description:   Instruct learner(s) on proper safety adaptation and remediation techniques during self care or transfers.   Instruct in proper use of assistive devices.              Learning Progress Summary           Patient Acceptance, E,TB,D, VU,DU by  at 11/26/2022 1404    Comment: ed pt on role of OT. benefit of therapy, POC w. OT. ed on safety w ADL and tsf. ed on log roll and back precautions. pt demo wmin A bed mobility.   Family Acceptance, E,TB,D, VU,DU by  at 11/26/2022 1404    Comment: ed pt on role of OT. benefit of therapy, POC w. OT. ed on safety w ADL and tsf. ed on log roll and back precautions. pt demo wmin A bed mobility.                   Point: Home exercise program (Done)     Description:   Instruct learner(s) on appropriate technique for monitoring, assisting and/or progressing therapeutic exercises/activities.              Learning Progress Summary           Patient Acceptance, E,TB,D, VU,DU by  at 11/26/2022 1404    Comment: ed pt on role of OT. benefit of therapy, POC w. OT. ed on safety w ADL and tsf. ed on log roll and back precautions. pt demo wmin A bed mobility.   Family Acceptance, E,TB,D, VU,DU by  at 11/26/2022 1404    Comment: ed pt on role of OT. benefit of therapy, POC w. OT. ed on safety w ADL and tsf. ed on log roll and back precautions. pt demo wmin A bed mobility.                   Point: Precautions (Done)     Description:   Instruct learner(s) on prescribed precautions during self-care and functional transfers.              Learning Progress Summary           Patient Acceptance, E,TB,D, VU,DU by  at 11/26/2022 1404    Comment: ed pt on role of OT. benefit of therapy, POC w. OT. ed on safety w ADL and tsf. ed on log roll and back precautions. pt demo wmin A bed mobility.   Family Acceptance,  E,TB,D, VU,DU by  at 11/26/2022 1404    Comment: ed pt on role of OT. benefit of therapy, POC w. OT. ed on safety w ADL and tsf. ed on log roll and back precautions. pt demo wmin A bed mobility.                   Point: Body mechanics (Done)     Description:   Instruct learner(s) on proper positioning and spine alignment during self-care, functional mobility activities and/or exercises.              Learning Progress Summary           Patient Acceptance, E,TB,D, VU,DU by  at 11/26/2022 1404    Comment: ed pt on role of OT. benefit of therapy, POC w. OT. ed on safety w ADL and tsf. ed on log roll and back precautions. pt demo wmin A bed mobility.   Family Acceptance, E,TB,D, VU,DU by  at 11/26/2022 1404    Comment: ed pt on role of OT. benefit of therapy, POC w. OT. ed on safety w ADL and tsf. ed on log roll and back precautions. pt demo wmin A bed mobility.                               User Key     Initials Effective Dates Name Provider Type Discipline     06/16/21 -  Lina Berry, OTR Occupational Therapist OT              OT Recommendation and Plan     Plan of Care Review  Plan of Care Reviewed With: patient  Progress: declining  Outcome Evaluation: Pt able to participate in OT today, he remains confused but does state he knows he is in the hospital today. At times less impulsive/more calm but still needs constant supervision/hands on assistance for pt pulling at lines during therapy. He is able to sit EOB today and 1 sit to stand before fatiqueing. He is able to participate in a few bADLs but mostly dependent for all LB ADLs/toileting. Also RN notifed and present during encounter as pt has very slurred and nasally speech while sitting EOB than has been worse than when pt was talking to therapists while in bed as well as banda catheter leaking. Pt will continue to benefit from OT as he presents below baseline with all ADLs and mobility. Anticipate SNF at [place].     Time Calculation:    Time  Calculation- OT     Row Name 12/02/22 1332             Time Calculation- OT    OT Start Time 1001  -SM      OT Stop Time 1021  -      OT Time Calculation (min) 20 min  -SM      Total Timed Code Minutes- OT 20 minute(s)  -SM      OT Received On 12/02/22  -      OT - Next Appointment 12/05/22  -         Timed Charges    79968 - OT Therapeutic Activity Minutes 20  -SM         Total Minutes    Timed Charges Total Minutes 20  -SM       Total Minutes 20  -SM            User Key  (r) = Recorded By, (t) = Taken By, (c) = Cosigned By    Initials Name Provider Type     Mar Saba OT Occupational Therapist              Therapy Charges for Today     Code Description Service Date Service Provider Modifiers Qty    [id number] HC OT THERAPEUTIC ACT EA 15 MIN 12/2/2022 Mar Saba OT GO 1               Mar Saba OT  12/2/2022

## 2022-12-02 NOTE — PLAN OF CARE
Goal Outcome Evaluation:         Patient is AOX2-3. Currently patient is pleasant. Needs redirecting. Still having NG output. Does not complain of pain.

## 2022-12-02 NOTE — PLAN OF CARE
Goal Outcome Evaluation:  Plan of Care Reviewed With: patient           Outcome Evaluation: Pt Confused, A&O x1, VSS, RA, Patient still in restraints, still restless throught the night. Will continue to monitor.

## 2022-12-02 NOTE — PROGRESS NOTES
"[facility] Clinical Pharmacy Services: Enoxaparin Consult    Boni Hernandez has a pharmacy consult to dose prophylactic enoxaparin per Sakina EMERY's request.     Indication: VTE Prophylaxis  Home Anticoagulation: none     Relevant clinical data and objective history reviewed:  82 y.o. male 170.2 cm (67\") 54.5 kg (120 lb 2.4 oz)   Body mass index is 18.82 kg/m².   Results from last 7 days   Lab Units 12/02/22  0744   PLATELETS 10*3/mm3 285     Estimated Creatinine Clearance: 49.9 mL/min (by C-G formula based on SCr of 0.88 mg/dL).    Assessment/Plan    Will start patient on 40mg subcutaneous every 24 hours, adjusted for renal function. Consult order will be discontinued but pharmacy will continue to follow.     Shraddha Valerio, [facility]  Clinical Pharmacist    "

## 2022-12-02 NOTE — PROGRESS NOTES
"[facility] Clinical Pharmacy Services: Total Parental Nutrition Initial Consult    Indication: Bowel Obstruction  Route: central  Type: standard    Relevant clinical data and objective history reviewed:  82 y.o. male 170.2 cm (67\") 54.5 kg (120 lb 2.4 oz)    Results from last 7 days   Lab Units 12/02/22  0744   SODIUM mmol/L 153*   POTASSIUM mmol/L 3.5   CHLORIDE mmol/L 116*   CO2 mmol/L 26.5   BUN mg/dL 27*   CREATININE mg/dL 0.88   CALCIUM mg/dL 8.3*   ALBUMIN g/dL 2.60*   BILIRUBIN mg/dL 0.6   ALK PHOS U/L 140*   ALT (SGPT) U/L 152*   AST (SGOT) U/L 134*   GLUCOSE mg/dL 177*   MAGNESIUM mg/dL 2.7*   PHOSPHORUS mg/dL 2.4*        Estimated Creatinine Clearance: 49.9 mL/min (by C-G formula based on SCr of 0.88 mg/dL).    Active fluid orders: D5W - order modified to stop before TPN is started    Dietary Orders (From admission, onward)       Start     Ordered    11/27/22 1031  NPO Diet NPO Type: Sips with Meds  Diet Effective Midnight        Question:  NPO Type  Answer:  Sips with Meds    11/27/22 1030                  Assessment  Ideal Body Weight: 66.1 kg  Body Weight Used for Calculations: 54.5 kg  Daily Est. Luca: 2425-4183 kCal/Day  Estimated Fluid Requirements: 1650 mL/day    Goal   Protein: 1 grams/kg/day (55 grams/day)   Dextrose: 900 Kcal/day   Lipids: 125 ml of 20% lipid infusion 7 days/week    Plan  Will start TPN at half goal and will taper up as appropriate. Will initiate TPN with the following macros:   Protein/Dextrose/Lipids: 55g/900Kcal/25g    Volume: 1680 ml (70 mL/hr over 24 hrs daily)    Based on the above labs, will add the following electrolytes/additives to the TPN.    Sodium Chloride: 60 mEq   Sodium Acetate: 0 mEq   Sodium Phosphate: 0 mEq   Potassium Chloride: 50 mEq   Potassium Acetate: 0 mEq   Potassium Phosphate: 22 mEq   Calcium Gluconate: 9 mEq   Magnesium Sulfate: 0 mEq   MVI added MWF   Trace Elements              Other Additives:     Labs to be ordered: MARIA M, " Triglycerides    Pharmacy will continue to follow.     Marty Delaney, [facility]  Clinical Pharmacist

## 2022-12-02 NOTE — SIGNIFICANT NOTE
12/02/22 1541   PICC Single Lumen 12/02/22 Left Basilic   Placement Date/Time: 12/02/22 1538   Hand Hygiene Completed: Yes  Size (Fr): 4  Description (optional): power picc, exp. date 2023-08-31, lot no. [id number]  Length (cm): 40 cm  Orientation: Left  Location: Basilic  Site Prep: Chlorhexidine isopropyl al...   Site Assessment Clean;Dry;Intact   #1 Lumen Status Blood return noted;Capped;Flushed   Dressing Type Border Dressing;Transparent;Securing device;Antimicrobial dressing/disc   Dressing Change Due 12/09/22   Indication/Daily Review of Necessity intravenous medication therapy   3 needles, 2 guidewires and 1 scalpel counted post procedure.

## 2022-12-02 NOTE — PROGRESS NOTES
"Nutrition Services    Patient Name:  Boni Hernandez  YOB: 1940  MRN: [id number]  Admit Date:  11/23/2022      CLINICAL NUTRITION - PROGRESS NOTE    Nutrition follow up:  MSA/TPN recommendations:    NPO x 5 days.   Ileus, NGT to LWS continues, (12/1) 700 ml, (11/30) 1150 ml, (11/29)1200 ml output    2 BM's 12/1  Starting TPN today.    Severe Malnutrition (Based on ASPEN/AND criteria, pt meets nurition diagnosis of Severe Malnutrition of Acute Disease due to inadequate po intake, 30 lb (20%) wt loss x 2-3 weeks and severe fat/muscle wasting noted on NFPE.)    Recommend:  1. Starting TPN: 1635 kcals total    80 g Protein (320 kcals)    200 ml 20% Lipids (400 kcals)    915 kcals Dextrose  Start TPN at 25% goal and taper up daily to goal rate due to pt at risk for re-feeding syndrome prolonged NPO status. Check E-lytes daily and replace as needed (K, Mg, Phos). Check TG weekly.     RD to continue to follow closely.    Encounter Information         Reason for Encounter Follow up/ MSA/ TPN recs    Current Issues NPO x 5 days.   Ileus, NGT to LWS continues, (12/1) 700 ml, (11/30) 1150 ml, (11/29)1200 ml output    2 BM's 12/1  Starting TPN today.     Current Nutrition Orders & Evaluation of Intake       Oral Nutrition     Current PO Diet NPO Diet NPO Type: Sips with Meds  Adult Central 2-in-1 TPN   Supplement    PO Evaluation n/a    % PO Intake/# days n/a   --  Anthropometrics          Height    Weight  Height: 170.2 cm (67\")  Weight: 54.5 kg (120 lb 2.4 oz) (12/02/22 0600)    BMI kg/m2 Body mass index is 18.82 kg/m².    Weight Trend/Change Loss, Amount/Timeframe: 30 lb (20%) wt loss x 2-3 weeks     Estimated/Assessed Needs       Energy Requirements    Height for Calculation  Height: 170.2 cm (67\")   Weight for Calculation Weight: 54.5 kg (120 lb 2.4 oz)   Method for Estimation  30 kcal/kg   EST Needs (kcal/day) 1635 kcals       Protein Requirements    Weight for Calculation Weight: 54.5 kg (120 lb 2.4 oz)   EST " Protein Needs (g/kg) 1.2 - 1.5 gm/kg   EST Daily Needs (g/day) 66-82       Fluid Requirements     Method for Estimation 30 mL/kg    Estimated Needs (mL/day) 1635 ml     Physical Findings          Skin, GI, Oral, LDA B=14, lumbar spin inc, L arm skin tear, bruised; hypoactive bowel sounds, last BMI 12/2; soft restraints; NGT to LWS   NFPE See Malnutrition Severity Assessment, Date Completed: 12/2   --  Malnutrition Severity Assessment      Patient meets criteria for : Severe Malnutrition (Based on ASPEN/AND criteria, pt meets nurition diagnosis of Severe Malnutrition of Acute Disease due to inadequate po intake, 30 lb (20%) wt loss x 2-3 weeks and severe fat/muscle wasting noted on NFPE.)  Malnutrition Type (last 8 hours)     Malnutrition Severity Assessment     Row Name 12/02/22 1341       Malnutrition Severity Assessment    Malnutrition Type Acute Disease or Injury - Related Malnutrition    Row Name 12/02/22 1341       Insufficient Energy Intake     Insufficient Energy Intake Findings Severe    Insufficient Energy Intake  <50% of est. energy requirement for >or equal to 1 month    Row Name 12/02/22 1341       Unintentional Weight Loss     Unintentional Weight Loss Findings Severe    Unintentional Weight Loss  Weight loss greater than 5% in one month  30 lb (20%) wt loss x 2-3 weeks    Row Name 12/02/22 1341       Muscle Loss    Loss of Muscle Mass Findings Severe    [place] Severe - deep hollowing/scooping, lack of muscle to touch, facial bones well defined    Clavicle Bone Region Severe - protruding prominent bone    Acromion Bone Region Severe - squared shoulders, bones, and acromion process protrusion prominent    Scapular Bone Region Severe - prominent bones, depressions easily visible between ribs, scapula, spine, shoulders    Dorsal Hand Region Severe - prominent depression    Patellar Region Severe - prominent bone, square looking, very little muscle definition    Anterior Thigh Region Severe -  line/depression along thigh, obviously thin    Posterior Calf Region Severe - thin with very little definition/firmness    Row Name 12/02/22 1341       Fat Loss    Subcutaneous Fat Loss Findings Severe    Orbital Region  Severe - pronounced hollowness/depression, dark circles, loose saggy skin    Upper Arm Region Severe - mostly skin, very little space between folds, fingers touch    Row Name 12/02/22 1341       Criteria Met (Must meet criteria for severity in at least 2 of these categories: M Wasting, Fat Loss, Fluid, Secondary Signs, Wt. Status, Intake)    Patient meets criteria for  Severe Malnutrition  Based on ASPEN/AND criteria, pt meets nurition diagnosis of Severe Malnutrition of Acute Disease due to inadequate po intake, 30 lb (20%) wt loss x 2-3 weeks and severe fat/muscle wasting noted on NFPE.                   Labs        Pertinent Labs Reviewed, listed below     Results from last 7 days   Lab Units 12/02/22  0744 12/01/22  1602 12/01/22  1111 11/30/22  0717 11/29/22  0644   SODIUM mmol/L 153* 153* 154*   < > 139   POTASSIUM mmol/L 3.5 3.8 3.8   < > 4.5   CHLORIDE mmol/L 116* 117* 120*   < > 109*   CO2 mmol/L 26.5 26.9 24.0   < > 19.0*   BUN mg/dL 27* 32* 37*   < > 58*   CREATININE mg/dL 0.88 1.04 0.94   < > 1.39*   CALCIUM mg/dL 8.3* 8.4* 8.6   < > 8.1*   BILIRUBIN mg/dL 0.6  --  0.5  --  0.5   ALK PHOS U/L 140*  --  145*  --  88   ALT (SGPT) U/L 152*  --  201*  --  77*   AST (SGOT) U/L 134*  --  233*  --  102*   GLUCOSE mg/dL 177* 129* 152*   < > 109*    < > = values in this interval not displayed.     Results from last 7 days   Lab Units 12/02/22  0744 12/01/22  1111 11/30/22  0717 11/29/22  0644   MAGNESIUM mg/dL 2.7* 3.0*  --  2.8*   PHOSPHORUS mg/dL 2.4* 2.7   < >  --    HEMOGLOBIN g/dL 9.0* 9.1*   < > 9.5*   HEMATOCRIT % 28.8* 27.6*   < > 29.9*   WBC 10*3/mm3 9.26 13.69*   < > 13.85*   ALBUMIN g/dL 2.60* 2.80*   < > 2.10*    < > = values in this interval not displayed.     Results from last 7  days   Lab Units 12/02/22  0744 12/01/22  1111 11/30/22  0717 11/29/22  0644 11/28/22  0644   PLATELETS 10*3/mm3 285 297 271 246 254     No results found for: COVID19  Lab Results   Component Value Date    HGBA1C 5.30 11/28/2022          Medications            Scheduled Medications bisacodyl, 10 mg, Rectal, Daily  enoxaparin, 40 mg, Subcutaneous, Q24H  Fat Emulsion Plant Based, 100 mL, Intravenous, Q24H (TPN)  methylnaltrexone, 6 mg, Subcutaneous, Every Other Day  multivitamin with minerals, 1 tablet, Oral, Daily  pantoprazole, 40 mg, Intravenous, Q AM  piperacillin-tazobactam, 3.375 g, Intravenous, Q8H  sodium chloride, 10 mL, Intravenous, Q12H  sodium chloride, 3 mL, Intravenous, Q12H  tamsulosin, 0.4 mg, Oral, Daily        Infusions Adult Central 2-in-1 TPN,   Pharmacy to Dose TPN,         PRN Medications •  acetaminophen  •  nitroglycerin  •  ondansetron **OR** ondansetron  •  Pharmacy to Dose TPN  •  sodium chloride  •  sodium chloride  •  sodium chloride  •  sodium chloride  •  traMADol       NUTRITION INTERVENTION / PLAN OF CARE  Intervention Goal        Intervention Goal(s) Maintain nutrition status, Reduce/improve symptoms, Meet estimated needs, Disease management/therapy, Initiate feeding/diet and Maintain weight     Nutrition Intervention        RD Action Await begin PO diet, Await initiation of EN/PN, Follow Tx Progress and Care plan reviewed     Nutrition Prescription         PO Diet      Supplement/Snack    EN/PN  Start TPN, see below    Prescription Ordered No changes at this time      Parenteral Prescription:     TPN Route Central   TPN Rate (mL/hr) 70 lm/hr   TPN Recommendation:        Dextrose (kcal) 915 kcals       Amino Acid (gm) 80 g (320 kcals)       Lipid Concentration 20%       Lipid Volume/Frequency  200 mL   Propofol Rate/Kcal    TPN Provision:  1635 kcal, 80 gm protein        Calories 100 % needs met        Protein  100 % needs met        Fluid 1680 mL total    Prescription Ordered No,  recommended     Monitor/Evaluation        Monitor Per protocol   Discharge Needs Pending clinical course   Education Will instruct as appropriate     RD to follow up per protocol.    Electronically signed by:  Radha Euceda RD  12/02/22 18:04 EST

## 2022-12-02 NOTE — PROGRESS NOTES
"NEUROSURGERY POST OP NOTE     LOS: 3 days   Patient Care Team:  Kiki Weiss APRN as PCP - General (Nurse Practitioner)  Mark Abraham MD as Consulting Physician (Cardiology)    Subjective     Interval History: Appears more comfortable. More alert. NG tube remains to LWS with IV hydration per general surgery. Denies nausea. Urinary leakage noted in bed but has banda cath in place.     History taken from: patient chart RN     Objective        Vital Signs  Temp:  [97.1 °F (36.2 °C)-98.7 °F (37.1 °C)] 97.4 °F (36.3 °C)  Heart Rate:  [] 99  Resp:  [18] 18  BP: (127-145)/(57-69) 127/69     Physical Exam:     AA&O to person and place. Year \"2002.\"   Follows commands.   Abdomen soft, non-distended, less tender to palpation.   Lumbar dressing dry and intact. Incision well approximated without redness, swelling or drainage.   No calf swelling or tenderness to bilateral palpation.        Results Review:     I reviewed the patient's new clinical results.     .  Results from last 7 days   Lab Units 12/02/22  0744 12/01/22  1111 11/30/22  0717   WBC 10*3/mm3 9.26 13.69* 14.19*   HEMOGLOBIN g/dL 9.0* 9.1* 8.9*   HEMATOCRIT % 28.8* 27.6* 27.8*   PLATELETS 10*3/mm3 285 297 271     .  Results from last 7 days   Lab Units 12/02/22  0744 12/01/22  1602 12/01/22  1111   SODIUM mmol/L 153* 153* 154*   POTASSIUM mmol/L 3.5 3.8 3.8   CHLORIDE mmol/L 116* 117* 120*   CO2 mmol/L 26.5 26.9 24.0   BUN mg/dL 27* 32* 37*   CREATININE mg/dL 0.88 1.04 0.94   GLUCOSE mg/dL 177* 129* 152*   CALCIUM mg/dL 8.3* 8.4* 8.6       Assessment & Plan       Lumbar spinal stenosis    Postoperative urinary retention    Postoperative ileus (HCC)    Leukocytosis (leucocytosis)    Spinal stenosis of lumbar region with neurogenic claudication    POD 9 bilateral L3-4 laminectomy with neurolysis L4 nerve root and fusion for lumbar stenosis neurogenic claudication    Postoperative urinary retention  Postoperative ileus  Banda had to be reinserted for retention; " evidence of urine leakage. Nurse asked to trouble shoot banda catheter to determine cause of urine leakage and re-consult urology if needed.   Start lovenox for DVT prophylaxis   CBC a.m.  Mobilize      Sakina Goldstein, APRN  12/02/22  10:19 EST

## 2022-12-02 NOTE — THERAPY TREATMENT NOTE
Patient Name: Boni Hernandez  : 1940    MRN: [id number]                              Today's Date: 2022       Admit Date: 2022    Visit Dx:     ICD-10-CM ICD-9-CM   1. Spinal stenosis of lumbar region with neurogenic claudication  M48.062 724.03     Patient Active Problem List   Diagnosis   • Lumbar spinal stenosis   • DDD (degenerative disc disease), lumbar   • Spondylolisthesis of lumbar region   • Hyperlipidemia   • HTN (hypertension)   • Coronary artery disease   • BPH (benign prostatic hyperplasia)   • GERD (gastroesophageal reflux disease)   • Constipation   • Postoperative urinary retention   • Postoperative ileus (HCC)   • Leukocytosis (leucocytosis)   • Spinal stenosis of lumbar region with neurogenic claudication     Past Medical History:   Diagnosis Date   • BPH (benign prostatic hyperplasia)    • Cataract    • Coronary artery disease    • DDD (degenerative disc disease), lumbar    • GERD (gastroesophageal reflux disease)    • History of MI (myocardial infarction)     APPROX. 2 YEARS AGO, HAS STENT, FOLLOWED BY DR LOREDO, 10/21/19 STRESS TEST AT [facility]   • [place] (hard of hearing)    • HTN (hypertension)    • Hyperlipidemia    • Injury of back    • Low back pain     RADIATES DOWN BOTH LEGS   • Spinal stenosis      Past Surgical History:   Procedure Laterality Date   • CAROTID ENDARTERECTOMY Left 2017   • CATARACT EXTRACTION WITH INTRAOCULAR LENS IMPLANT Bilateral    • COLONOSCOPY     • CORONARY ANGIOPLASTY WITH STENT PLACEMENT  2016   • DENTAL PROCEDURE      DENTAL IMPLANTS   • EPIDURAL BLOCK     • LUMBAR DISCECTOMY FUSION INSTRUMENTATION N/A 2019    Procedure: Lumbar 4 to lumbar 5 laminectomy with a posterior lateral fusion and instrumentation;  Surgeon: Matthew Alex MD;  Location: [facility];  Service: Neurosurgery   • LUMBAR FUSION N/A 2022    Procedure: Lumbar 3 to lumbar 4 laminectomy with fusion and instrumentation and removal of previous instrumentation at  lumbar 4 to lumbar 5;  Surgeon: Matthew Alex MD;  Location: [facility] OR;  Service: Robotics - Neuro;  Laterality: N/A;   • OTHER SURGICAL HISTORY  2015    PT STATES HE HAS HAD STENTS PUT IN BOTH LEGS.    • TONSILLECTOMY        General Information     Row Name 12/02/22 1335          Physical Therapy Time and Intention    Document Type therapy note (daily note)  -EM     Mode of Treatment co-treatment;physical therapy;occupational therapy  -EM     Row Name 12/02/22 1335          General Information    Patient Profile Reviewed yes  -EM     Existing Precautions/Restrictions fall;spinal;NPO  NG tube to suction  -EM           User Key  (r) = Recorded By, (t) = Taken By, (c) = Cosigned By    Initials Name Provider Type    EM Virgie Lynch PT Physical Therapist               Mobility     Row Name 12/02/22 1338          Bed Mobility    Rolling Right [place] (Bed Mobility) minimum assist (75% patient effort);verbal cues  -EM     Supine-Sit [place] (Bed Mobility) moderate assist (50% patient effort);verbal cues  -EM     Sit-Supine [place] (Bed Mobility) moderate assist (50% patient effort);2 person assist  -EM     Assistive Device (Bed Mobility) head of bed elevated  -EM     Row Name 12/02/22 133          Sit-Stand Transfer    Sit-Stand [place] (Transfers) moderate assist (50% patient effort);2 person assist;verbal cues  -EM     Comment, (Sit-Stand Transfer) sit to stand x 2 trials at the bedside, able to achieve full upright standing but fatigues quickly, unable to weight shift to take a step  -EM           User Key  (r) = Recorded By, (t) = Taken By, (c) = Cosigned By    Initials Name Provider Type    Virgie Gotti PT Physical Therapist               Obj/Interventions    No documentation.                Goals/Plan    No documentation.                Clinical Impression     Row Name 12/02/22 1339          Pain    Pain Location - back  -EM     Pre/Posttreatment Pain Comment pt c/o  "back pain, also states he \"hurts all over\" but does not rate on numeric scale  -EM     Pain Intervention(s) Medication (See MAR);Repositioned  -EM     Row Name 12/02/22 2309          Plan of Care Review    Plan of Care Reviewed With patient  -EM     Outcome Evaluation Patient awake and alert, more oriented today, confused but able to be redirected, still in restraints. Patient performed bed mobility with Aaron and verbal cues, performed sit to stand tsf with modAx2, able to achieve full standing but unable to weight shift to take any steps. Patient with NG tube to suction, vocal quality changed once sitting on EOB, nsg notified and came to room to assess patient. Dale cath also leaking and nsg aware. Patient returned to supine, anticipate need for SNU at d/c.  -EM     Row Name 12/02/22 1334          Positioning and Restraints    Pre-Treatment Position in bed  -EM     Post Treatment Position bed  -EM     In Bed supine;call light within reach;exit alarm on;notified nsg  -EM           User Key  (r) = Recorded By, (t) = Taken By, (c) = Cosigned By    Initials Name Provider Type    EM Virgie Lynch, PT Physical Therapist               Outcome Measures     Row Name 12/02/22 1346          How much help from another person do you currently need...    Turning from your back to your side while in flat bed without using bedrails? 2  -EM     Moving from lying on back to sitting on the side of a flat bed without bedrails? 2  -EM     Moving to and from a bed to a chair (including a wheelchair)? 1  -EM     Standing up from a chair using your arms (e.g., wheelchair, bedside chair)? 2  -EM     Climbing 3-5 steps with a railing? 1  -EM     To walk in hospital room? 1  -EM     AM-PAC 6 Clicks Score (PT) 9  -EM     Highest level of mobility 3 --> Sat at edge of bed  -EM     Row Name 12/02/22 4251          Functional Assessment    Outcome Measure Options AM-PAC 6 Clicks Daily Activity (OT)  -SM           User Key  (r) = Recorded " By, (t) = Taken By, (c) = Cosigned By    Initials Name Provider Type    EM Virgie Lynch, PT Physical Therapist    Mar Henriquez, OT Occupational Therapist                             Physical Therapy Education     Title: PT OT SLP Therapies (In Progress)     Topic: Physical Therapy (In Progress)     Point: Mobility training (In Progress)     Learning Progress Summary           Patient Acceptance, E, NR by EM at 12/2/2022 1346    Acceptance, E, NR by EM at 11/30/2022 1629    Acceptance, E,D, NR,NL by PH at 11/29/2022 1548    Acceptance, E,TB, VU,DU by LB at 11/27/2022 1400    Acceptance, E,TB,D, VU,DU by LB at 11/26/2022 1245    Acceptance, E,D, VU,NR by MS at 11/25/2022 1156    Acceptance, E,TB, VU,DU by CALI at 11/24/2022 1135                   Point: Home exercise program (In Progress)     Learning Progress Summary           Patient Acceptance, E,D, NR,NL by PH at 11/29/2022 1548    Acceptance, E,TB, VU,DU by LB at 11/27/2022 1400    Acceptance, E,TB,D, VU,DU by LB at 11/26/2022 1245    Acceptance, E,D, VU,NR by MS at 11/25/2022 1156    Acceptance, E,TB, VU,DU by CALI at 11/24/2022 1135                   Point: Body mechanics (In Progress)     Learning Progress Summary           Patient Acceptance, E,D, NR,NL by PH at 11/29/2022 1548    Acceptance, E,TB, VU,DU by LB at 11/27/2022 1400    Acceptance, E,TB,D, VU,DU by LB at 11/26/2022 1245    Acceptance, E,D, VU,NR by MS at 11/25/2022 1156    Acceptance, E,TB, VU,DU by CALI at 11/24/2022 1135                   Point: Precautions (In Progress)     Learning Progress Summary           Patient Acceptance, E,D, NR,NL by PH at 11/29/2022 1548    Acceptance, E,TB, VU,DU by LB at 11/27/2022 1400    Acceptance, E,TB,D, VU,DU by LB at 11/26/2022 1245    Acceptance, FIORDALIZA PEPE, CHELY,NR by MS at 11/25/2022 1156    Acceptance, DEON PEPE VU,DU by CALI at 11/24/2022 1135                               User Key     Initials Effective Dates Name Provider Type Discipline    EM 06/16/21 -   Virgie Lynch, PT Physical Therapist PT    MS 06/16/21 -  Martinez Solares, PT Physical Therapist PT    CALI 05/19/21 -  Merlene Haines, PT Physical Therapist PT    LB 08/09/20 -  Leticia Cadet, PT Physical Therapist PT    PH 06/16/21 -  Miri Khan, PTA Physical Therapist Assistant PT              PT Recommendation and Plan     Plan of Care Reviewed With: patient  Outcome Evaluation: Patient awake and alert, more oriented today, confused but able to be redirected, still in restraints. Patient performed bed mobility with Aaron and verbal cues, performed sit to stand tsf with modAx2, able to achieve full standing but unable to weight shift to take any steps. Patient with NG tube to suction, vocal quality changed once sitting on EOB, nsg notified and came to room to assess patient. Dale cath also leaking and nsg aware. Patient returned to supine, anticipate need for SNU at d/c.     Time Calculation:    PT Charges     Row Name 12/02/22 1347             Time Calculation    Start Time 1001  -EM      Stop Time 1019  -EM      Time Calculation (min) 18 min  -EM      PT Received On 12/02/22  -EM      PT - Next Appointment 12/05/22  -EM         Time Calculation- PT    Total Timed Code Minutes- PT 18 minute(s)  -EM         Timed Charges    65498 - PT Therapeutic Activity Minutes 18  -EM         Total Minutes    Timed Charges Total Minutes 18  -EM       Total Minutes 18  -EM            User Key  (r) = Recorded By, (t) = Taken By, (c) = Cosigned By    Initials Name Provider Type    EM Virgie Lynch, PT Physical Therapist              Therapy Charges for Today     Code Description Service Date Service Provider Modifiers Qty    [id number]  PT THERAPEUTIC ACT EA 15 MIN 12/2/2022 Virgie Lynch, PT GP 1          PT G-Codes  Outcome Measure Options: AM-PAC 6 Clicks Daily Activity (OT)  AM-PAC 6 Clicks Score (PT): 9  AM-PAC 6 Clicks Score (OT): 11  PT Discharge Summary  Anticipated Discharge  Disposition (PT): skilled nursing facility    Virgie Lynch, PT  12/2/2022

## 2022-12-02 NOTE — PLAN OF CARE
Goal Outcome Evaluation:  Plan of Care Reviewed With: patient        Progress: declining  Outcome Evaluation: Pt able to participate in OT today, he remains confused but does state he knows he is in the hospital today. At times less impulsive/more calm but still needs constant supervision/hands on assistance for pt pulling at lines during therapy. He is able to sit EOB today and 1 sit to stand before fatiqueing. He is able to participate in a few bADLs but mostly dependent for all LB ADLs/toileting. Also RN notifed and present during encounter as pt has very slurred and nasally speech while sitting EOB than has been worse than when pt was talking to therapists while in bed as well as banda catheter leaking. Pt will continue to benefit from OT as he presents below baseline with all ADLs and mobility. Anticipate SNF at ID.    OT wore appropriate PPE and completed hand hygiene.

## 2022-12-02 NOTE — PROGRESS NOTES
[facility]    [phone]   LOS: 3 days     Name: Boni Hernandez  Age/Sex: 82 y.o. male  :  1940        PCP: Kiki Weiss APRN  No chief complaint on file.     Subjective   Remains confused but more awake and less lethargic each day.  Denies any pain currently but on exam still pretty tender.    bisacodyl, 10 mg, Rectal, Daily  methylnaltrexone, 6 mg, Subcutaneous, Every Other Day  multivitamin with minerals, 1 tablet, Oral, Daily  pantoprazole, 40 mg, Intravenous, Q AM  piperacillin-tazobactam, 3.375 g, Intravenous, Q8H  sodium chloride, 3 mL, Intravenous, Q12H  tamsulosin, 0.4 mg, Oral, Daily      dextrose, 150 mL/hr, Last Rate: 150 mL/hr (22 0934)  Pharmacy to Dose TPN,         Objective   Vital Signs  Temp:  [97.4 °F (36.3 °C)-98.7 °F (37.1 °C)] 97.4 °F (36.3 °C)  Heart Rate:  [] 99  Resp:  [18] 18  BP: (127-145)/(60-69) 127/69  Body mass index is 18.82 kg/m².    Intake/Output Summary (Last 24 hours) at 2022 1114  Last data filed at 2022 0800  Gross per 24 hour   Intake 2150 ml   Output 1800 ml   Net 350 ml       Physical Exam  Constitutional:       General: He is not in acute distress.     Appearance: He is ill-appearing.   Cardiovascular:      Rate and Rhythm: Regular rhythm. Tachycardia present.   Pulmonary:      Effort: Pulmonary effort is normal. No respiratory distress.   Abdominal:      General: There is distension.      Palpations: Abdomen is soft.      Tenderness: There is abdominal tenderness.      Comments: Belly is soft remains tender throughout buy more tender on the right side this AM   Neurological:      General: No focal deficit present.      Comments: Lethargic but improved compared to yesterday           Results Review:       I reviewed the patient's new clinical results.  Results from last 7 days   Lab Units 22  0744 22  1111 22  0717 22  0644 22  0644 22  0431 22  0411   WBC 10*3/mm3 9.26 13.69* 14.19*  13.85* 16.66* 15.11* 9.82   HEMOGLOBIN g/dL 9.0* 9.1* 8.9* 9.5* 10.3* 11.3* 10.0*   PLATELETS 10*3/mm3 285 297 271 246 254 237 172     Results from last 7 days   Lab Units 12/02/22  0744 12/01/22  1602 12/01/22  1111 11/30/22  1745 11/30/22  0717 11/29/22  0644 11/28/22  1624 11/28/22  0644 11/27/22  1540   SODIUM mmol/L 153* 153* 154* 152* 148*  148* 139 139 133* 131*   POTASSIUM mmol/L 3.5 3.8 3.8 3.9 3.8 4.5 4.9 4.8 4.6   CHLORIDE mmol/L 116* 117* 120* 118* 114* 109* 106 100 95*   CO2 mmol/L 26.5 26.9 24.0 22.4 23.0 19.0* 21.0* 22.1 24.3   BUN mg/dL 27* 32* 37* 52* 54* 58* 62* 59* 36*   CREATININE mg/dL 0.88 1.04 0.94 1.07 1.38* 1.39* 1.83* 1.79* 1.02   CALCIUM mg/dL 8.3* 8.4* 8.6 8.9 8.3* 8.1* 8.0* 7.9* 8.5*   MAGNESIUM mg/dL 2.7*  --  3.0*  --   --  2.8*  --  2.3  --    PHOSPHORUS mg/dL 2.4*  --  2.7 3.3 3.9  --  3.6 2.8 3.1   Estimated Creatinine Clearance: 49.9 mL/min (by C-G formula based on SCr of 0.88 mg/dL).      Assessment & Plan   Active Hospital Problems    Diagnosis  POA   • **Lumbar spinal stenosis [M48.061]  Yes   • Spinal stenosis of lumbar region with neurogenic claudication [M48.062]  Yes   • Postoperative urinary retention [N99.89, R33.8]  No   • Postoperative ileus (HCC) [K91.89, K56.7]  No   • Leukocytosis (leucocytosis) [D72.829]  No      Resolved Hospital Problems    Diagnosis Date Resolved POA   • Hyponatremia [E87.1] 11/30/2022 Yes       PLAN  This is an 82-year-old gentleman who presented to the hospital with a history of lumbar spine stenosis and medical comorbidities including BPH, hypertension and coronary artery disease he underwent surgical correction and is postoperative day 3.  [facility] consulted for hypertension management.    1.  Hypertension  -Blood pressure has stabilized and improved remains off medications  -Continue to hold Coreg, Imdur, lisinopril (DC'd for now can add back 1 at a time), NPO with no oral meds at this time  -HR improving today  -restart BB as tolerates PO and BP  allows, Cardiology now following  2.  Hyponatremia  -nephrology consulted and they are following  -Sodium had improved with fluids but became hypernatremic.  Started on D5.  Hypernatremia stable today but likely needs additional free water.    -TPN started which may help  3.  Ileus  -General surgery is following  -Await return of bowel function  -CT reviewed   -Exam somewhat improved today but still needs NPO; initiate TPN  4.  Urinary retention  -Tamsulosin initiated (on hold while NPO)  -Urology folowing, FC placed, leaking noted after patient pulled on the catheter nurse to notify urology  -UC negative  5.  MESFIN  -Likely prerenal given output from the NG tube  -Creatinine approaching baseline  -recheck labs in AM  6.  Possible aspiration pneumonia vs pneumonitis  -XR showed LLL consolidation  -Continue zosyn given increasing WBC and tachycardia although these are somewhat non specific. D 5/5  -MRSA screen negative can stop vancomycin form my perspective  7.  Lumbar spine stenosis  -Defer postoperative care to primary team  8.  Metabolic encephalopathy  -Remains in restraints    -Mechanical DVT prophylaxis would recommend initiation of sub cu heparin if pimary ok with AC  -Full code      Disposition  Per primary      Mor Yost MD  [facility]  12/02/22  11:14 EST

## 2022-12-02 NOTE — PROGRESS NOTES
Boni Hernandez   82 y.o.  male    LOS: 3 days   Patient Care Team:  Kiki Weiss APRN as PCP - General (Nurse Practitioner)  Mark Abraham MD as Consulting Physician (Cardiology)      Subjective   Abdomen more sft,still confused  Interval History:     Patient Complaints:     Review of Systems:       Medication Review:   Current Facility-Administered Medications:   •  acetaminophen (TYLENOL) tablet 1,000 mg, 1,000 mg, Oral, Q6H PRN, Matthew Alex MD  •  bisacodyl (DULCOLAX) suppository 10 mg, 10 mg, Rectal, Daily, Corbin Mc MD, 10 mg at 12/02/22 0935  •  dextrose (D5W) 5 % infusion, 150 mL/hr, Intravenous, Continuous, Neida Webb MD, Last Rate: 150 mL/hr at 12/02/22 0934, 150 mL/hr at 12/02/22 0934  •  methylnaltrexone (RELISTOR) injection 6 mg, 6 mg, Subcutaneous, Every Other Day, Corbin Mc MD, 6 mg at 12/01/22 1135  •  multivitamin with minerals 1 tablet, 1 tablet, Oral, Daily, Matthew Alex MD, 1 tablet at 11/29/22 0944  •  nitroglycerin (NITROSTAT) SL tablet 0.4 mg, 0.4 mg, Sublingual, Q5 Min PRN, Matthew Alex MD  •  ondansetron (ZOFRAN) tablet 4 mg, 4 mg, Oral, Q6H PRN **OR** ondansetron (ZOFRAN) injection 4 mg, 4 mg, Intravenous, Q6H PRN, Matthew Alex MD, 4 mg at 11/27/22 1104  •  pantoprazole (PROTONIX) injection 40 mg, 40 mg, Intravenous, Q AM, Mor Yost MD, 40 mg at 12/02/22 0637  •  piperacillin-tazobactam (ZOSYN) 3.375 g in iso-osmotic dextrose 50 ml (premix), 3.375 g, Intravenous, Q8H, Mor Yost MD, 3.375 g at 12/02/22 0935  •  sodium chloride 0.9 % flush 10 mL, 10 mL, Intravenous, PRN, Matthew Alex MD  •  sodium chloride 0.9 % flush 3 mL, 3 mL, Intravenous, Q12H, Matthew Alex MD, 3 mL at 12/02/22 0935  •  sodium chloride 0.9 % infusion 40 mL, 40 mL, Intravenous, PRN, Matthew Alex MD  •  tamsulosin (FLOMAX) 24 hr capsule 0.4 mg, 0.4 mg, Oral, Daily, Patrick Henry Jr., MD, 0.4 mg at 11/27/22 1005  •  traMADol (ULTRAM)  tablet 50 mg, 50 mg, Oral, Q8H PRN, Sakina Goldstein, APRN, 50 mg at 11/27/22 2310      Objective   Vital Sign Min/Max for last 24 hours  Temp  Min: 97.1 °F (36.2 °C)  Max: 98.7 °F (37.1 °C)   BP  Min: 127/69  Max: 145/65    Pulse  Min: 83  Max: 102     Wt Readings from Last 3 Encounters:   12/02/22 54.5 kg (120 lb 2.4 oz)   11/10/22 68.9 kg (152 lb)   10/27/22 70.3 kg (155 lb)        Intake/Output Summary (Last 24 hours) at 12/2/2022 1011  Last data filed at 12/2/2022 0800  Gross per 24 hour   Intake 2150 ml   Output 1800 ml   Net 350 ml     Physical Exam:      General Appearance:    Well developed and well nourished in no acute distress   Head:    Normocephalic, atraumatic   Eyes:            Conjunctivae normal, anicteric, no xanthelasma   Neck:   supple, trachea midline, no thyromegaly, no carotid bruit, no JVD, no elevated CVP   Lungs:     Clear to auscultation,respirations regular, even and                  unlabored    Heart:    Regular rhythm and normal rate, normal S1 and S2,            No murmur, no gallop, no rub, no click   Chest Wall:    No abnormalities observed   Abdomen:     Normal bowel sounds, no masses, no organomegaly, soft        nontender, nondistended, no guarding, no rebound                tenderness   Rectal:     Deferred   Extremities:   No edema. Moves all extremities well, no cyanosis, no erythema   Pulses:   Pulses palpable and equal bilaterally   Skin:   No bleeding, bruising or rash   Neurologic:   awake alert and oriented x3, speech clear and approp, no facial drooping     :    Monitor:      Results Review:         Sodium Sodium   Date Value Ref Range Status   12/02/2022 153 (H) 136 - 145 mmol/L Final   12/01/2022 153 (H) 136 - 145 mmol/L Final   12/01/2022 154 (H) 136 - 145 mmol/L Final   11/30/2022 152 (H) 136 - 145 mmol/L Final   11/30/2022 148 (H) 136 - 145 mmol/L Final   11/30/2022 148 (H) 136 - 145 mmol/L Final      Potassium Potassium   Date Value Ref Range Status   12/02/2022  3.5 3.5 - 5.2 mmol/L Final   12/01/2022 3.8 3.5 - 5.2 mmol/L Final   12/01/2022 3.8 3.5 - 5.2 mmol/L Final   11/30/2022 3.9 3.5 - 5.2 mmol/L Final   11/30/2022 3.8 3.5 - 5.2 mmol/L Final      Chloride Chloride   Date Value Ref Range Status   12/02/2022 116 (H) 98 - 107 mmol/L Final   12/01/2022 117 (H) 98 - 107 mmol/L Final   12/01/2022 120 (H) 98 - 107 mmol/L Final   11/30/2022 118 (H) 98 - 107 mmol/L Final   11/30/2022 114 (H) 98 - 107 mmol/L Final      Bicarbonate No results found for: PLASMABICARB   BUN BUN   Date Value Ref Range Status   12/02/2022 27 (H) 8 - 23 mg/dL Final   12/01/2022 32 (H) 8 - 23 mg/dL Final   12/01/2022 37 (H) 8 - 23 mg/dL Final   11/30/2022 52 (H) 8 - 23 mg/dL Final   11/30/2022 54 (H) 8 - 23 mg/dL Final      Creatinine Creatinine   Date Value Ref Range Status   12/02/2022 0.88 0.76 - 1.27 mg/dL Final   12/01/2022 1.04 0.76 - 1.27 mg/dL Final   12/01/2022 0.94 0.76 - 1.27 mg/dL Final   11/30/2022 1.07 0.76 - 1.27 mg/dL Final   11/30/2022 1.38 (H) 0.76 - 1.27 mg/dL Final      Calcium Calcium   Date Value Ref Range Status   12/02/2022 8.3 (L) 8.6 - 10.5 mg/dL Final   12/01/2022 8.4 (L) 8.6 - 10.5 mg/dL Final   12/01/2022 8.6 8.6 - 10.5 mg/dL Final   11/30/2022 8.9 8.6 - 10.5 mg/dL Final   11/30/2022 8.3 (L) 8.6 - 10.5 mg/dL Final      Magnesium Magnesium   Date Value Ref Range Status   12/02/2022 2.7 (H) 1.6 - 2.4 mg/dL Final   12/01/2022 3.0 (H) 1.6 - 2.4 mg/dL Final        Results from last 7 days   Lab Units 12/02/22  0744   WBC 10*3/mm3 9.26   HEMOGLOBIN g/dL 9.0*   HEMATOCRIT % 28.8*   PLATELETS 10*3/mm3 285     No results found for: TROPONINT         Echo EF Estimated  No results found for: ECHOEFEST      Assessment/ Plan  Assessment & Plan   Active Hospital Problems    Diagnosis  POA   • **Lumbar spinal stenosis [M48.061]  Yes   • Spinal stenosis of lumbar region with neurogenic claudication [M48.062]  Yes   • Postoperative urinary retention [N99.89, R33.8]  No   • Postoperative  ileus (HCC) [K91.89, K56.7]  No   • Leukocytosis (leucocytosis) [D72.829]  No     1. Urinary retention post-op back surgery 11/23 for spinal stenosis  Urology foll  -f/c  -h/o bph     2.  postoperative ileus  -abdominal distention and nausea  -NGT  gen surgery foll     3. Hypertension with hypotension  - holding Coreg, Imdur, lisinopril     4. Hyponatremia/MESFIN  Renal foll- improving with ivf's     5.  Metabolic encephalopathy     6. Possible aspiration pneumonia vs pneumonitis  -XR showed LLL consolidation  -On  zosyn primary foll  -MRSA screen pending     7. hyperlipidemia     8. CAD    A.  stenting of the circumflex in 2016     B. Stenting of the LAD in March 2021     9. appropriate Sinus tachycardia     Plan  He is off DAPT however his stent was more than a year ago.  bp meds on hold ngt lws, bp ok for now  CT scan of the abdomen shows ileus and diverticulosis  To continue NG suction  We will see Monday      Andrade Guerrero MD  12/02/22  10:11 EST

## 2022-12-03 ENCOUNTER — APPOINTMENT (OUTPATIENT)
Dept: GENERAL RADIOLOGY | Facility: HOSPITAL | Age: 82
End: 2022-12-03

## 2022-12-03 LAB
ALBUMIN SERPL-MCNC: 2.5 G/DL (ref 3.5–5.2)
ALBUMIN/GLOB SERPL: 1 G/DL
ALP SERPL-CCNC: 107 U/L (ref 39–117)
ALT SERPL W P-5'-P-CCNC: 105 U/L (ref 1–41)
ANION GAP SERPL CALCULATED.3IONS-SCNC: 5 MMOL/L (ref 5–15)
AST SERPL-CCNC: 71 U/L (ref 1–40)
BILIRUB SERPL-MCNC: 0.4 MG/DL (ref 0–1.2)
BUN SERPL-MCNC: 18 MG/DL (ref 8–23)
BUN/CREAT SERPL: 20.9 (ref 7–25)
CALCIUM SPEC-SCNC: 8.2 MG/DL (ref 8.6–10.5)
CHLORIDE SERPL-SCNC: 116 MMOL/L (ref 98–107)
CO2 SERPL-SCNC: 28 MMOL/L (ref 22–29)
CREAT SERPL-MCNC: 0.86 MG/DL (ref 0.76–1.27)
DEPRECATED RDW RBC AUTO: 42.9 FL (ref 37–54)
EGFRCR SERPLBLD CKD-EPI 2021: 86.5 ML/MIN/1.73
ERYTHROCYTE [DISTWIDTH] IN BLOOD BY AUTOMATED COUNT: 12.9 % (ref 12.3–15.4)
GLOBULIN UR ELPH-MCNC: 2.4 GM/DL
GLUCOSE SERPL-MCNC: 169 MG/DL (ref 65–99)
HCT VFR BLD AUTO: 25 % (ref 37.5–51)
HGB BLD-MCNC: 8 G/DL (ref 13–17.7)
MAGNESIUM SERPL-MCNC: 2.2 MG/DL (ref 1.6–2.4)
MCH RBC QN AUTO: 28.2 PG (ref 26.6–33)
MCHC RBC AUTO-ENTMCNC: 32 G/DL (ref 31.5–35.7)
MCV RBC AUTO: 88 FL (ref 79–97)
PHOSPHATE SERPL-MCNC: 2.5 MG/DL (ref 2.5–4.5)
PLATELET # BLD AUTO: 241 10*3/MM3 (ref 140–450)
PMV BLD AUTO: 9.5 FL (ref 6–12)
POTASSIUM SERPL-SCNC: 3.6 MMOL/L (ref 3.5–5.2)
PROT SERPL-MCNC: 4.9 G/DL (ref 6–8.5)
RBC # BLD AUTO: 2.84 10*6/MM3 (ref 4.14–5.8)
SODIUM SERPL-SCNC: 149 MMOL/L (ref 136–145)
TRIGL SERPL-MCNC: 101 MG/DL (ref 0–150)
WBC NRBC COR # BLD: 6.45 10*3/MM3 (ref 3.4–10.8)

## 2022-12-03 PROCEDURE — 83735 ASSAY OF MAGNESIUM: CPT | Performed by: NEUROLOGICAL SURGERY

## 2022-12-03 PROCEDURE — 84478 ASSAY OF TRIGLYCERIDES: CPT | Performed by: HOSPITALIST

## 2022-12-03 PROCEDURE — 25010000002 POTASSIUM CHLORIDE PER 2 MEQ OF POTASSIUM: Performed by: HOSPITALIST

## 2022-12-03 PROCEDURE — 25010000002 METHYLNALTREXONE 12 MG/0.6ML SOLUTION: Performed by: SURGERY

## 2022-12-03 PROCEDURE — 74019 RADEX ABDOMEN 2 VIEWS: CPT

## 2022-12-03 PROCEDURE — 25010000002 ENOXAPARIN PER 10 MG: Performed by: NURSE PRACTITIONER

## 2022-12-03 PROCEDURE — 84100 ASSAY OF PHOSPHORUS: CPT | Performed by: NEUROLOGICAL SURGERY

## 2022-12-03 PROCEDURE — 80053 COMPREHEN METABOLIC PANEL: CPT | Performed by: NEUROLOGICAL SURGERY

## 2022-12-03 PROCEDURE — 85027 COMPLETE CBC AUTOMATED: CPT | Performed by: NURSE PRACTITIONER

## 2022-12-03 PROCEDURE — 99231 SBSQ HOSP IP/OBS SF/LOW 25: CPT | Performed by: SURGERY

## 2022-12-03 PROCEDURE — 25010000002 PIPERACILLIN SOD-TAZOBACTAM PER 1 G: Performed by: HOSPITALIST

## 2022-12-03 PROCEDURE — 25010000002 CALCIUM GLUCONATE PER 10 ML: Performed by: HOSPITALIST

## 2022-12-03 RX ADMIN — Medication 10 ML: at 20:33

## 2022-12-03 RX ADMIN — CALCIUM GLUCONATE: 98 INJECTION, SOLUTION INTRAVENOUS at 18:57

## 2022-12-03 RX ADMIN — Medication 3 ML: at 11:59

## 2022-12-03 RX ADMIN — Medication 10 ML: at 11:59

## 2022-12-03 RX ADMIN — BISACODYL 10 MG: 10 SUPPOSITORY RECTAL at 08:39

## 2022-12-03 RX ADMIN — TAZOBACTAM SODIUM AND PIPERACILLIN SODIUM 3.38 G: 375; 3 INJECTION, SOLUTION INTRAVENOUS at 08:39

## 2022-12-03 RX ADMIN — MULTIPLE VITAMINS W/ MINERALS TAB 1 TABLET: TAB at 08:39

## 2022-12-03 RX ADMIN — TAZOBACTAM SODIUM AND PIPERACILLIN SODIUM 3.38 G: 375; 3 INJECTION, SOLUTION INTRAVENOUS at 01:08

## 2022-12-03 RX ADMIN — PANTOPRAZOLE SODIUM 40 MG: 40 INJECTION, POWDER, FOR SOLUTION INTRAVENOUS at 06:13

## 2022-12-03 RX ADMIN — METHYLNALTREXONE BROMIDE 6 MG: 12 INJECTION, SOLUTION SUBCUTANEOUS at 08:39

## 2022-12-03 RX ADMIN — I.V. FAT EMULSION 20 G: 20 EMULSION INTRAVENOUS at 18:58

## 2022-12-03 RX ADMIN — Medication 3 ML: at 20:33

## 2022-12-03 RX ADMIN — ENOXAPARIN SODIUM 40 MG: 100 INJECTION SUBCUTANEOUS at 18:59

## 2022-12-03 RX ADMIN — TAMSULOSIN HYDROCHLORIDE 0.4 MG: 0.4 CAPSULE ORAL at 08:39

## 2022-12-03 NOTE — PROGRESS NOTES
[facility]    [phone]   LOS: 4 days     Name: Boni Hernandez  Age/Sex: 82 y.o. male  :  1940        PCP: Kiki Weiss APRN  No chief complaint on file.     Subjective     Patient is lying in bed remains confused and NG tube remains in place and is receiving TPN.  He is also in restraints.    bisacodyl, 10 mg, Rectal, Daily  enoxaparin, 40 mg, Subcutaneous, Q24H  Fat Emulsion Plant Based, 100 mL, Intravenous, Q24H (TPN)  methylnaltrexone, 6 mg, Subcutaneous, Every Other Day  multivitamin with minerals, 1 tablet, Oral, Daily  pantoprazole, 40 mg, Intravenous, Q AM  sodium chloride, 10 mL, Intravenous, Q12H  sodium chloride, 3 mL, Intravenous, Q12H  tamsulosin, 0.4 mg, Oral, Daily      Adult Central 2-in-1 TPN, , Last Rate: 70 mL/hr at 22 1821  Adult Central 2-in-1 TPN,   Pharmacy to Dose TPN,         Objective   Vital Signs  Temp:  [97.7 °F (36.5 °C)-98.4 °F (36.9 °C)] 98.3 °F (36.8 °C)  Heart Rate:  [] 104  Resp:  [16-18] 16  BP: (127-163)/(57-82) 144/59  Body mass index is 19.06 kg/m².    Intake/Output Summary (Last 24 hours) at 12/3/2022 1813  Last data filed at 12/3/2022 1701  Gross per 24 hour   Intake 30 ml   Output --   Net 30 ml       Physical Exam  Constitutional:       General: He is not in acute distress.     Appearance: He is ill-appearing.   HENT:      Head: Normocephalic and atraumatic.      Mouth/Throat:      Mouth: Mucous membranes are dry.   Eyes:      Extraocular Movements: Extraocular movements intact.      Pupils: Pupils are equal, round, and reactive to light.   Cardiovascular:      Rate and Rhythm: Regular rhythm. Tachycardia present.      Pulses: Normal pulses.      Heart sounds: Normal heart sounds.   Pulmonary:      Effort: Pulmonary effort is normal. No respiratory distress.   Abdominal:      General: There is distension.      Palpations: Abdomen is soft.      Tenderness: There is abdominal tenderness.      Comments: Belly is soft remains tender  throughout buy more tender on the right side this AM   Musculoskeletal:      Cervical back: Neck supple.      Right lower leg: No edema.      Left lower leg: No edema.   Skin:     General: Skin is warm and dry.   Neurological:      General: No focal deficit present.      Comments: Lethargic but improved compared to yesterday           Results Review:       I reviewed the patient's new clinical results.  Results from last 7 days   Lab Units 12/03/22  1159 12/02/22  0744 12/01/22  1111 11/30/22  0717 11/29/22  0644 11/28/22  0644 11/27/22  0431   WBC 10*3/mm3 6.45 9.26 13.69* 14.19* 13.85* 16.66* 15.11*   HEMOGLOBIN g/dL 8.0* 9.0* 9.1* 8.9* 9.5* 10.3* 11.3*   PLATELETS 10*3/mm3 241 285 297 271 246 254 237     Results from last 7 days   Lab Units 12/03/22  1159 12/02/22  0744 12/01/22  1602 12/01/22  1111 11/30/22  1745 11/30/22  0717 11/29/22  0644 11/28/22  1624 11/28/22  0644   SODIUM mmol/L 149* 153* 153* 154* 152* 148*  148* 139 139 133*   POTASSIUM mmol/L 3.6 3.5 3.8 3.8 3.9 3.8 4.5 4.9 4.8   CHLORIDE mmol/L 116* 116* 117* 120* 118* 114* 109* 106 100   CO2 mmol/L 28.0 26.5 26.9 24.0 22.4 23.0 19.0* 21.0* 22.1   BUN mg/dL 18 27* 32* 37* 52* 54* 58* 62* 59*   CREATININE mg/dL 0.86 0.88 1.04 0.94 1.07 1.38* 1.39* 1.83* 1.79*   CALCIUM mg/dL 8.2* 8.3* 8.4* 8.6 8.9 8.3* 8.1* 8.0* 7.9*   MAGNESIUM mg/dL 2.2 2.7*  --  3.0*  --   --  2.8*  --  2.3   PHOSPHORUS mg/dL 2.5 2.4*  --  2.7 3.3 3.9  --  3.6 2.8   Estimated Creatinine Clearance: 51.7 mL/min (by C-G formula based on SCr of 0.86 mg/dL).      Assessment & Plan   Active Hospital Problems    Diagnosis  POA   • **Lumbar spinal stenosis [M48.061]  Yes   • Severe malnutrition (HCC) [E43]  Yes   • Spinal stenosis of lumbar region with neurogenic claudication [M48.062]  Yes   • Postoperative urinary retention [N99.89, R33.8]  No   • Postoperative ileus (HCC) [K91.89, K56.7]  No   • Leukocytosis (leucocytosis) [D72.829]  No      Resolved Hospital Problems    Diagnosis  Date Resolved POA   • Hyponatremia [E87.1] 11/30/2022 Yes       PLAN  This is an 82-year-old gentleman who presented to the hospital with a history of lumbar spine stenosis and medical comorbidities including BPH, hypertension and coronary artery disease he underwent surgical correction and is postoperative day 3.  [facility] consulted for hypertension management.      1.  Hypertension  -  Blood pressure in the acceptable range and for the moment will continue with p.r.n. antihypertensives.  -Continue to hold Coreg, Imdur, lisinopril (DC'd for now can add back 1 at a time), NPO with no oral meds at this time    2.   Hypernatremia, sodium is 149 and Nephrology is following along.    3.  Ileus , NG tube remains in place and general surgery is following along.    4.  Urinary retention , Dale catheter is in place and is on Flomax and Urology is following along.  Once patient more oriented and ambulatory Dale can be removed and bladder training can be done.    5.  MESFIN , resolved felt to be multifactorial in etiology.   Creatinine did rise to 1.83 and now it is at 0.86. nephrology following along as well.    6. Suspected aspiration pneumonia, x-ray suggestive of left lower lobe infiltrate.  Currently on Zosyn and will be continued.  MRSA screen was negative therefore vancomycin was discontinued.    7.  Lumbar spine stenosis  -Defer postoperative care to primary team    8.  Metabolic encephalopathy  -Remains in restraints    9. On Lovenox for DVT prophylaxis.      10. Code status is full code.      Austin Moscoso MD  [facility]  12/03/22  18:13 EST

## 2022-12-03 NOTE — PROGRESS NOTES
[facility] Progress Note      Patient Name: Boni Henrandez  : 1940  MRN: [id number]  Primary Care Physician:  Kiki Weiss APRN  Date of admission: 2022    Subjective     Interval History:   Follow up hypovolemic hyponatremia  Patient resting comfortably.  NG tube in place  No acute distress overnight    Review of Systems:   Not obtainable    Objective     Vitals:   Temp:  [97.7 °F (36.5 °C)-98.4 °F (36.9 °C)] 97.8 °F (36.6 °C)  Heart Rate:  [73-91] 75  Resp:  [16-18] 18  BP: (127-163)/() 134/82    Intake/Output Summary (Last 24 hours) at 12/3/2022 1040  Last data filed at 2022 1707  Gross per 24 hour   Intake 1450 ml   Output 50 ml   Net 1400 ml       Physical Exam:    Constitutional: Awake, somewhat confused, chronically ill  HEENT: Sclera anicteric, oral mucosa dry, edentulous. +NGT   Neck:  no JVD  Heart : RRR, no s3  Lungs: a few crackles; no wheezing; not labored on room air  Abd: BS absent, soft, grimaces with palpation, ++ distended  : No palpable bladder  Ext: No edema.  Neurologic: moving all extremities  Skin: Warm and dry      Scheduled Meds:     bisacodyl, 10 mg, Rectal, Daily  enoxaparin, 40 mg, Subcutaneous, Q24H  Fat Emulsion Plant Based, 100 mL, Intravenous, Q24H (TPN)  methylnaltrexone, 6 mg, Subcutaneous, Every Other Day  multivitamin with minerals, 1 tablet, Oral, Daily  pantoprazole, 40 mg, Intravenous, Q AM  piperacillin-tazobactam, 3.375 g, Intravenous, Q8H  sodium chloride, 10 mL, Intravenous, Q12H  sodium chloride, 3 mL, Intravenous, Q12H  tamsulosin, 0.4 mg, Oral, Daily      IV Meds:   Adult Central 2-in-1 TPN, , Last Rate: 70 mL/hr at 22 1821  Pharmacy to Dose TPN,         Results Reviewed:   I have personally reviewed the results from the time of this admission to 12/3/2022 10:40 EST     Results from last 7 days   Lab Units 22  0744 22  1602 22  1111 22  0717 22  0644   SODIUM mmol/L 153* 153* 154*    < > 139   POTASSIUM mmol/L 3.5 3.8 3.8   < > 4.5   CHLORIDE mmol/L 116* 117* 120*   < > 109*   CO2 mmol/L 26.5 26.9 24.0   < > 19.0*   BUN mg/dL 27* 32* 37*   < > 58*   CREATININE mg/dL 0.88 1.04 0.94   < > 1.39*   CALCIUM mg/dL 8.3* 8.4* 8.6   < > 8.1*   BILIRUBIN mg/dL 0.6  --  0.5  --  0.5   ALK PHOS U/L 140*  --  145*  --  88   ALT (SGPT) U/L 152*  --  201*  --  77*   AST (SGOT) U/L 134*  --  233*  --  102*   GLUCOSE mg/dL 177* 129* 152*   < > 109*    < > = values in this interval not displayed.       Estimated Creatinine Clearance: 50.5 mL/min (by C-G formula based on SCr of 0.88 mg/dL).    Results from last 7 days   Lab Units 12/02/22  0744 12/01/22  1111 11/30/22  1745 11/30/22  0717 11/29/22  0644   MAGNESIUM mg/dL 2.7* 3.0*  --   --  2.8*   PHOSPHORUS mg/dL 2.4* 2.7 3.3   < >  --     < > = values in this interval not displayed.       Results from last 7 days   Lab Units 11/30/22  0717 11/28/22  0644 11/27/22  0431   URIC ACID mg/dL 5.9 5.5 4.4       Results from last 7 days   Lab Units 12/02/22  0744 12/01/22  1111 11/30/22  0717 11/29/22  0644 11/28/22  0644   WBC 10*3/mm3 9.26 13.69* 14.19* 13.85* 16.66*   HEMOGLOBIN g/dL 9.0* 9.1* 8.9* 9.5* 10.3*   PLATELETS 10*3/mm3 285 297 271 246 254             Assessment / Plan     ASSESSMENT:  1.    Hypernatremia.  Intravascular volume depletion.  S/p D5W.  Sodium stable per last labs.  Patient receiving free water replacement with TPN now  2.  MESFIN, nonoliguric, resolved:  3.  Lumbar spinal stenosis s/p laminectomy and facetectomy on 11/23  4.  Postoperative ileus: NG tube in place  5.  Hypertension, controlled.    6.  Urinary retention, with previous Dale catheter  7.  Anemia, stable  8. Elevated transaminases     PLAN:  1.  Free water replacement with TPN  2.  Low threshold to give an additional liter of D5W if hypernatremia worsens    Darnell Damian MD  12/03/22  10:40 EST    [facility]  [phone]

## 2022-12-03 NOTE — PLAN OF CARE
Goal Outcome Evaluation: pt had bm today and ngt discontinued, pt having periods of less confusion. Hypoactive BS with soft nontender abd.  Vs stable. Bed alarm on, frequent turning

## 2022-12-03 NOTE — PROGRESS NOTES
[facility] Progress Note      Patient Name: Boni Hernandez  : 1940  MRN: [id number]  Primary Care Physician:  Kiki Weiss APRN  Date of admission: 2022    Subjective     Interval History:   Follow up hypovolemic hyponatremia  D5W stopped earlier this evening; TPN has not yet begun  Confused and lethargic    Review of Systems:   Not obtainable    Objective     Vitals:   Temp:  [97.4 °F (36.3 °C)-98.7 °F (37.1 °C)] 98.4 °F (36.9 °C)  Heart Rate:  [83-99] 84  Resp:  [16-18] 16  BP: (127-147)/() 141/62    Intake/Output Summary (Last 24 hours) at 2022  Last data filed at 2022 1707  Gross per 24 hour   Intake 1600 ml   Output 850 ml   Net 750 ml       Physical Exam:    Constitutional: Lethargic, confused, chronically ill  HEENT: Sclera anicteric, oral mucosa dry, edentulous. +NGT   Neck:  no JVD  Heart : RRR, no s3  Lungs: a few crackles; no wheezing; not labored on room air  Abd: BS absent, soft, grimaces with palpation, ++ distended  : No palpable bladder  Ext: No edema.  Neurologic: moving all extremities  Skin: Warm and dry      Scheduled Meds:     bisacodyl, 10 mg, Rectal, Daily  enoxaparin, 40 mg, Subcutaneous, Q24H  Fat Emulsion Plant Based, 100 mL, Intravenous, Q24H (TPN)  methylnaltrexone, 6 mg, Subcutaneous, Every Other Day  multivitamin with minerals, 1 tablet, Oral, Daily  pantoprazole, 40 mg, Intravenous, Q AM  piperacillin-tazobactam, 3.375 g, Intravenous, Q8H  sodium chloride, 10 mL, Intravenous, Q12H  sodium chloride, 3 mL, Intravenous, Q12H  tamsulosin, 0.4 mg, Oral, Daily      IV Meds:   Adult Central 2-in-1 TPN, , Last Rate: 70 mL/hr at 22 1821  Pharmacy to Dose TPN,         Results Reviewed:   I have personally reviewed the results from the time of this admission to 2022 19:21 EST     Results from last 7 days   Lab Units 22  0744 22  1602 22  1111 22  0717 22  0644   SODIUM mmol/L 153* 153* 154*    < > 139   POTASSIUM mmol/L 3.5 3.8 3.8   < > 4.5   CHLORIDE mmol/L 116* 117* 120*   < > 109*   CO2 mmol/L 26.5 26.9 24.0   < > 19.0*   BUN mg/dL 27* 32* 37*   < > 58*   CREATININE mg/dL 0.88 1.04 0.94   < > 1.39*   CALCIUM mg/dL 8.3* 8.4* 8.6   < > 8.1*   BILIRUBIN mg/dL 0.6  --  0.5  --  0.5   ALK PHOS U/L 140*  --  145*  --  88   ALT (SGPT) U/L 152*  --  201*  --  77*   AST (SGOT) U/L 134*  --  233*  --  102*   GLUCOSE mg/dL 177* 129* 152*   < > 109*    < > = values in this interval not displayed.       Estimated Creatinine Clearance: 49.9 mL/min (by C-G formula based on SCr of 0.88 mg/dL).    Results from last 7 days   Lab Units 12/02/22  0744 12/01/22  1111 11/30/22  1745 11/30/22  0717 11/29/22  0644   MAGNESIUM mg/dL 2.7* 3.0*  --   --  2.8*   PHOSPHORUS mg/dL 2.4* 2.7 3.3   < >  --     < > = values in this interval not displayed.       Results from last 7 days   Lab Units 11/30/22  0717 11/28/22  0644 11/27/22  0431   URIC ACID mg/dL 5.9 5.5 4.4       Results from last 7 days   Lab Units 12/02/22  0744 12/01/22  1111 11/30/22  0717 11/29/22  0644 11/28/22  0644   WBC 10*3/mm3 9.26 13.69* 14.19* 13.85* 16.66*   HEMOGLOBIN g/dL 9.0* 9.1* 8.9* 9.5* 10.3*   PLATELETS 10*3/mm3 285 297 271 246 254             Assessment / Plan     ASSESSMENT:  1.  Hypovolemic hyponatremia, acute on chronic, unchanged.  Still looks quite dry.  D5W was to be infusing until TPN started, though neither present  2.  MESFIN, nonoliguric, resolved:  prerenal from volume contraction, hypotension, and abdominal process. NAGMA resolved  3.  Lumbar spinal stenosis s/p laminectomy and facetectomy on 11/23  4.  Postoperative ileus: NG tube in place  5.  Hypertension, controlled.    6.  Urinary retention, with previous Dale catheter  7.  Anemia, stable  8. Elevated transaminases     PLAN:  1.  TPN  2.  Low threshold to give an additional liter of D5W if serum sodium not improved tomorrow  3.  Will hold off potassium phosphate IV as both present in  this evening's TPN    Freddie Estevez MD  12/02/22  19:21 EST    [facility]  [phone]

## 2022-12-03 NOTE — PROGRESS NOTES
CC: Postop ileus    S: No events overnight.  Patient having much bowel function.  Continues to be confused    O:   Vitals:    12/02/22 1100 12/02/22 1606 12/02/22 1947 12/02/22 1948   BP: (!) 147/127 141/62 139/57 147/60   BP Location: Right arm  Right arm Right arm   Patient Position: Lying  Lying Lying   Pulse: 91 84 77 83   Resp: 16 16 18 18   Temp: 97.8 °F (36.6 °C) 98.4 °F (36.9 °C)  98 °F (36.7 °C)   TempSrc: Oral Oral  Oral   SpO2: 92% 94% 94% 96%   Weight:       Height:           Stool recorded x2  Alert, confused, no distress  Abdomen soft, mildly tender to palpation throughout, nondistended.  No peritoneal signs    Sodium 153, chloride 116, BUN 27, magnesium 2.7  Hemoglobin 9  White blood cell count 9.2    Assessment and plan    82-year-old male with ileus after spine surgery.  Since he started to have bowel function.  Still bilious output from the NG    Will order abdominal x-ray for tomorrow  N.p.o., agree with being

## 2022-12-03 NOTE — PROGRESS NOTES
Neurosurgical progress note:    Subjective:  Patient was seen and examined this morning.  Appears to be doing well.  Is reportedly still in restraints.  Has had a bowel movement however does still have an NG tube.    Objective:  Moving his legs fully without any signs of weakness.  Describes normal sensation in his legs    Assessment/plan:  This is an 82-year-old gentleman who is postoperative day 10 from a bilateral L3-L4 laminectomy and nerve root decompression with fusion for neurogenic claudication.  He had some postoperative urinary retention and postoperative ileus.  He can continue Dale at this juncture secondary to retention.  Will need a voiding trial prior to discharge.  Okay for DVT prophylaxis.  Recommend mobilizing him regularly with physical and Occupational Therapy.  Appreciate assistance from general surgery, nephrology, cardiology, hospitalist team.  Please call with any questions or concerns.

## 2022-12-03 NOTE — NURSING NOTE
"rn to pt bedside to assist pt with dinner, one wrist untied to hold ice water for sip, pt continues to be confused, pt tilted cup and rn tired to catch so it would not spill and pt crushed foam cup and threw water and cup at nurse. Nurse asked why he did that and pt stated \"I didn't want water\"  Pt acting very aggressive towards nurse. Nurse reorientated pt, pt mumbled something nurse could not understand.   "

## 2022-12-03 NOTE — NURSING NOTE
NG output by the end of the shift 50 ml. Spoke to Dr Valentin regarding this. Stat KUB was obtained. NG tube advanced by 5 cm and currently at 65 at the nares.

## 2022-12-03 NOTE — PLAN OF CARE
Goal Outcome Evaluation:  Plan of Care Reviewed With: patient   VSS on RA. No c/o pain. Restraints remain.

## 2022-12-03 NOTE — PLAN OF CARE
Goal Outcome Evaluation: pt becoming more confused  and agitated into the evening. Rn continues to reorient pt and check wrist restraints.

## 2022-12-03 NOTE — NURSING NOTE
Large amount of urine noted on the brief. Dale catheter in place.I spoke to Dr Yee regarding this and received the following orders.    1) irrigate cath  2)if leaking continues, DC cath and start voiding trial.    Irrigation unsuccessful. Dale Dc'd. Pt was able to urinate independently, post void residual 6 ml.

## 2022-12-03 NOTE — PROGRESS NOTES
Follow-up postop ileus    Subjective:  Feels better, still apparently intermittently confused but seems to be answering questions appropriately currently.  Denies any abdominal pain, reports a bowel movement and flatus.  Denies any abdominal pain.  NG output 550/150/50 last 3 shifts.    Objective:  Patient afebrile, vitals are stable  Abdomen: Soft, minimal tenderness  Extremities: Moderate peripheral edema    Assessment and plan:  -Postop ileus after recent spine surgery, clinically improving  -Remove NG today and try clear liquids  -Mobilize as able per neurosurgery  -No plans for any operative intervention at this time    Ricky Grover MD  General and Endoscopic Surgery  [facility] [place], operated [facility]    [address], [address]  [place], [place], [address]  P: [phone]  F: [phone]

## 2022-12-03 NOTE — PROGRESS NOTES
"[facility] Clinical Pharmacy Services: Total Parental Nutrition Initial Consult    Indication: Bowel Obstruction  Route: central  Type: standard    Relevant clinical data and objective history reviewed:  82 y.o. male 170.2 cm (67\") 55.2 kg (121 lb 11.1 oz)    Results from last 7 days   Lab Units 12/03/22  1159   SODIUM mmol/L 149*   POTASSIUM mmol/L 3.6   CHLORIDE mmol/L 116*   CO2 mmol/L 28.0   BUN mg/dL 18   CREATININE mg/dL 0.86   CALCIUM mg/dL 8.2*   ALBUMIN g/dL 2.50*   BILIRUBIN mg/dL 0.4   ALK PHOS U/L 107   ALT (SGPT) U/L 105*   AST (SGOT) U/L 71*   GLUCOSE mg/dL 169*   MAGNESIUM mg/dL 2.2   PHOSPHORUS mg/dL 2.5   TRIGLYCERIDES mg/dL 101        Estimated Creatinine Clearance: 51.7 mL/min (by C-G formula based on SCr of 0.86 mg/dL).    Active fluid orders: D5W - order modified to stop before TPN is started    Dietary Orders (From admission, onward)     Start     Ordered    11/27/22 1031  NPO Diet NPO Type: Sips with Meds  Diet Effective Midnight        Question:  NPO Type  Answer:  Sips with Meds    11/27/22 1030              Assessment  Ideal Body Weight: 66.1 kg  Body Weight Used for Calculations: 54.5 kg  Daily Est. Luca: 6280-7864 kCal/Day  Estimated Fluid Requirements: 1650 mL/day    Goal   Protein: 1 grams/kg/day (55 grams/day)   Dextrose: 900 Kcal/day   Lipids: 125 ml of 20% lipid infusion 7 days/week    Plan  Will initiate TPN with the following macros:   Protein/Dextrose/Lipids: 55g/900Kcal/25g    Volume: 1680 ml (70 mL/hr over 24 hrs daily)    Based on the above labs, will add the following electrolytes/additives to the TPN.    Sodium Chloride: 60 mEq   Sodium Acetate: 0 mEq   Sodium Phosphate: 0 mEq   Potassium Chloride: 50 mEq   Potassium Acetate: 0 mEq   Potassium Phosphate: 22 mEq   Calcium Gluconate: 9 mEq   Magnesium Sulfate: 0 mEq   MVI added MWF   Trace Elements              Other Additives:     Continue with same TPN formulation today. [facility] tomorrow    Pharmacy will continue to follow. "     Wilmar Suarez [facility]  Clinical Pharmacist

## 2022-12-04 LAB
ALBUMIN SERPL-MCNC: 2.4 G/DL (ref 3.5–5.2)
ALBUMIN/GLOB SERPL: 0.9 G/DL
ALP SERPL-CCNC: 99 U/L (ref 39–117)
ALT SERPL W P-5'-P-CCNC: 86 U/L (ref 1–41)
ANION GAP SERPL CALCULATED.3IONS-SCNC: 8.2 MMOL/L (ref 5–15)
AST SERPL-CCNC: 54 U/L (ref 1–40)
BASOPHILS # BLD AUTO: 0.01 10*3/MM3 (ref 0–0.2)
BASOPHILS NFR BLD AUTO: 0.1 % (ref 0–1.5)
BILIRUB SERPL-MCNC: 0.2 MG/DL (ref 0–1.2)
BUN SERPL-MCNC: 16 MG/DL (ref 8–23)
BUN/CREAT SERPL: 22.9 (ref 7–25)
CALCIUM SPEC-SCNC: 8.3 MG/DL (ref 8.6–10.5)
CHLORIDE SERPL-SCNC: 114 MMOL/L (ref 98–107)
CO2 SERPL-SCNC: 26.8 MMOL/L (ref 22–29)
CREAT SERPL-MCNC: 0.7 MG/DL (ref 0.76–1.27)
DEPRECATED RDW RBC AUTO: 53.5 FL (ref 37–54)
EGFRCR SERPLBLD CKD-EPI 2021: 92 ML/MIN/1.73
EOSINOPHIL # BLD AUTO: 0.15 10*3/MM3 (ref 0–0.4)
EOSINOPHIL NFR BLD AUTO: 2.1 % (ref 0.3–6.2)
ERYTHROCYTE [DISTWIDTH] IN BLOOD BY AUTOMATED COUNT: 14.7 % (ref 12.3–15.4)
GLOBULIN UR ELPH-MCNC: 2.6 GM/DL
GLUCOSE SERPL-MCNC: 135 MG/DL (ref 65–99)
HCT VFR BLD AUTO: 26.5 % (ref 37.5–51)
HGB BLD-MCNC: 7.9 G/DL (ref 13–17.7)
IMM GRANULOCYTES # BLD AUTO: 0.14 10*3/MM3 (ref 0–0.05)
IMM GRANULOCYTES NFR BLD AUTO: 2 % (ref 0–0.5)
LYMPHOCYTES # BLD AUTO: 1.14 10*3/MM3 (ref 0.7–3.1)
LYMPHOCYTES NFR BLD AUTO: 16 % (ref 19.6–45.3)
MCH RBC QN AUTO: 30.2 PG (ref 26.6–33)
MCHC RBC AUTO-ENTMCNC: 29.8 G/DL (ref 31.5–35.7)
MCV RBC AUTO: 101.1 FL (ref 79–97)
MONOCYTES # BLD AUTO: 0.44 10*3/MM3 (ref 0.1–0.9)
MONOCYTES NFR BLD AUTO: 6.2 % (ref 5–12)
NEUTROPHILS NFR BLD AUTO: 5.26 10*3/MM3 (ref 1.7–7)
NEUTROPHILS NFR BLD AUTO: 73.6 % (ref 42.7–76)
NRBC BLD AUTO-RTO: 0.1 /100 WBC (ref 0–0.2)
PLATELET # BLD AUTO: 232 10*3/MM3 (ref 140–450)
PMV BLD AUTO: 9.7 FL (ref 6–12)
POTASSIUM SERPL-SCNC: 3.7 MMOL/L (ref 3.5–5.2)
PROT SERPL-MCNC: 5 G/DL (ref 6–8.5)
RBC # BLD AUTO: 2.62 10*6/MM3 (ref 4.14–5.8)
SODIUM SERPL-SCNC: 149 MMOL/L (ref 136–145)
WBC NRBC COR # BLD: 7.14 10*3/MM3 (ref 3.4–10.8)

## 2022-12-04 PROCEDURE — 25010000002 ENOXAPARIN PER 10 MG: Performed by: NURSE PRACTITIONER

## 2022-12-04 PROCEDURE — 25010000002 CALCIUM GLUCONATE PER 10 ML: Performed by: HOSPITALIST

## 2022-12-04 PROCEDURE — 99231 SBSQ HOSP IP/OBS SF/LOW 25: CPT | Performed by: SURGERY

## 2022-12-04 PROCEDURE — 25010000002 POTASSIUM CHLORIDE PER 2 MEQ OF POTASSIUM: Performed by: HOSPITALIST

## 2022-12-04 PROCEDURE — 99024 POSTOP FOLLOW-UP VISIT: CPT | Performed by: PHYSICIAN ASSISTANT

## 2022-12-04 PROCEDURE — 80053 COMPREHEN METABOLIC PANEL: CPT | Performed by: HOSPITALIST

## 2022-12-04 PROCEDURE — 85025 COMPLETE CBC W/AUTO DIFF WBC: CPT | Performed by: INTERNAL MEDICINE

## 2022-12-04 PROCEDURE — 99222 1ST HOSP IP/OBS MODERATE 55: CPT | Performed by: PSYCHIATRY & NEUROLOGY

## 2022-12-04 RX ORDER — HALOPERIDOL 5 MG/ML
2 INJECTION INTRAMUSCULAR EVERY 6 HOURS PRN
Status: DISCONTINUED | OUTPATIENT
Start: 2022-12-04 | End: 2022-12-04

## 2022-12-04 RX ORDER — OLANZAPINE 5 MG/1
5 TABLET ORAL
Status: DISCONTINUED | OUTPATIENT
Start: 2022-12-04 | End: 2022-12-05

## 2022-12-04 RX ORDER — OLANZAPINE 10 MG/1
5 INJECTION, POWDER, LYOPHILIZED, FOR SOLUTION INTRAMUSCULAR EVERY 4 HOURS PRN
Status: DISCONTINUED | OUTPATIENT
Start: 2022-12-04 | End: 2022-12-13 | Stop reason: HOSPADM

## 2022-12-04 RX ADMIN — OLANZAPINE 5 MG: 10 INJECTION, POWDER, FOR SOLUTION INTRAMUSCULAR at 18:50

## 2022-12-04 RX ADMIN — ENOXAPARIN SODIUM 40 MG: 100 INJECTION SUBCUTANEOUS at 17:00

## 2022-12-04 RX ADMIN — Medication 10 ML: at 09:42

## 2022-12-04 RX ADMIN — PANTOPRAZOLE SODIUM 40 MG: 40 INJECTION, POWDER, FOR SOLUTION INTRAVENOUS at 05:45

## 2022-12-04 RX ADMIN — Medication 3 ML: at 22:27

## 2022-12-04 RX ADMIN — Medication 3 ML: at 09:44

## 2022-12-04 RX ADMIN — OLANZAPINE 5 MG: 5 TABLET ORAL at 18:37

## 2022-12-04 RX ADMIN — I.V. FAT EMULSION 20 G: 20 EMULSION INTRAVENOUS at 17:00

## 2022-12-04 RX ADMIN — TAMSULOSIN HYDROCHLORIDE 0.4 MG: 0.4 CAPSULE ORAL at 09:08

## 2022-12-04 RX ADMIN — CALCIUM GLUCONATE: 98 INJECTION, SOLUTION INTRAVENOUS at 17:00

## 2022-12-04 RX ADMIN — Medication 10 ML: at 22:27

## 2022-12-04 RX ADMIN — MULTIPLE VITAMINS W/ MINERALS TAB 1 TABLET: TAB at 09:08

## 2022-12-04 NOTE — PLAN OF CARE
Goal Outcome Evaluation: pt aggitated, vs stable, incision to back wnl, new drsg applied per md request, pt confused and in restraints. Rn frequently reorientates, pt pt still wants to leave., family at bedside; md dr michael updated, new orders placed

## 2022-12-04 NOTE — PROGRESS NOTES
[facility] Progress Note      Patient Name: Boni Hernandez  : 1940  MRN: [id number]  Primary Care Physician:  Kiki Weiss APRN  Date of admission: 2022    Subjective     Interval History:   Follow up hyponatremia  Patient resting comfortably.  NG tube removed  No acute distress overnight    Review of Systems:   Not obtainable    Objective     Vitals:   Temp:  [98.2 °F (36.8 °C)-98.6 °F (37 °C)] 98.6 °F (37 °C)  Heart Rate:  [] 90  Resp:  [16-18] 16  BP: (130-158)/(58-75) 158/63    Intake/Output Summary (Last 24 hours) at 2022 0959  Last data filed at 12/3/2022 1701  Gross per 24 hour   Intake 30 ml   Output --   Net 30 ml       Physical Exam:    Constitutional: Awake, somewhat confused, chronically ill  HEENT: Sclera anicteric, oral mucosa dry, edentulous. +NGT   Neck:  no JVD  Heart : RRR, no s3  Lungs: a few crackles; no wheezing; not labored on room air  Abd: BS absent, soft, grimaces with palpation, ++ distended  : No palpable bladder  Ext: No edema.  Neurologic: moving all extremities  Skin: Warm and dry      Scheduled Meds:     bisacodyl, 10 mg, Rectal, Daily  enoxaparin, 40 mg, Subcutaneous, Q24H  Fat Emulsion Plant Based, 100 mL, Intravenous, Q24H (TPN)  methylnaltrexone, 6 mg, Subcutaneous, Every Other Day  multivitamin with minerals, 1 tablet, Oral, Daily  pantoprazole, 40 mg, Intravenous, Q AM  sodium chloride, 10 mL, Intravenous, Q12H  sodium chloride, 3 mL, Intravenous, Q12H  tamsulosin, 0.4 mg, Oral, Daily      IV Meds:   Adult Central 2-in-1 TPN, , Last Rate: 70 mL/hr at 22 1857  Adult Central 2-in-1 TPN,   Pharmacy to Dose TPN,         Results Reviewed:   I have personally reviewed the results from the time of this admission to 2022 09:59 EST     Results from last 7 days   Lab Units 22  0738 22  1159 22  0744   SODIUM mmol/L 149* 149* 153*   POTASSIUM mmol/L 3.7 3.6 3.5   CHLORIDE mmol/L 114* 116* 116*   CO2 mmol/L  26.8 28.0 26.5   BUN mg/dL 16 18 27*   CREATININE mg/dL 0.70* 0.86 0.88   CALCIUM mg/dL 8.3* 8.2* 8.3*   BILIRUBIN mg/dL 0.2 0.4 0.6   ALK PHOS U/L 99 107 140*   ALT (SGPT) U/L 86* 105* 152*   AST (SGOT) U/L 54* 71* 134*   GLUCOSE mg/dL 135* 169* 177*       Estimated Creatinine Clearance: 63.6 mL/min (A) (by C-G formula based on SCr of 0.7 mg/dL (L)).    Results from last 7 days   Lab Units 12/03/22  1159 12/02/22  0744 12/01/22  1111   MAGNESIUM mg/dL 2.2 2.7* 3.0*   PHOSPHORUS mg/dL 2.5 2.4* 2.7       Results from last 7 days   Lab Units 11/30/22  0717 11/28/22  0644   URIC ACID mg/dL 5.9 5.5       Results from last 7 days   Lab Units 12/04/22  0414 12/03/22  1159 12/02/22  0744 12/01/22  1111 11/30/22  0717   WBC 10*3/mm3 7.14 6.45 9.26 13.69* 14.19*   HEMOGLOBIN g/dL 7.9* 8.0* 9.0* 9.1* 8.9*   PLATELETS 10*3/mm3 232 241 285 297 271             Assessment / Plan     ASSESSMENT:  1.    Hypernatremia.  Intravascular volume depletion.  S/p D5W.  Improving slowly.  Patient receiving free water replacement with TPN now  2.  MESFIN, nonoliguric, resolved:  3.  Lumbar spinal stenosis s/p laminectomy and facetectomy on 11/23  4.  Postoperative ileus: NG tube in place  5.  Hypertension, controlled.    6.  Urinary retention, with previous Dale catheter  7.  Anemia, stable  8. Elevated transaminases     PLAN:  1.  Continue Free water replacement with TPN  2.  Low threshold to give an additional liter of D5W if hypernatremia worsens    Darnell Damian MD  12/04/22  09:59 [facility]    [facility]  [phone]

## 2022-12-04 NOTE — PROGRESS NOTES
"[facility] Clinical Pharmacy Services: Total Parental Nutrition Initial Consult    Indication: Bowel Obstruction  Route: central  Type: standard    Relevant clinical data and objective history reviewed:  82 y.o. male 170.2 cm (67\") 55.3 kg (121 lb 14.6 oz)    Results from last 7 days   Lab Units 12/04/22  0738 12/03/22  1159   SODIUM mmol/L 149* 149*   POTASSIUM mmol/L 3.7 3.6   CHLORIDE mmol/L 114* 116*   CO2 mmol/L 26.8 28.0   BUN mg/dL 16 18   CREATININE mg/dL 0.70* 0.86   CALCIUM mg/dL 8.3* 8.2*   ALBUMIN g/dL 2.40* 2.50*   BILIRUBIN mg/dL 0.2 0.4   ALK PHOS U/L 99 107   ALT (SGPT) U/L 86* 105*   AST (SGOT) U/L 54* 71*   GLUCOSE mg/dL 135* 169*   MAGNESIUM mg/dL  --  2.2   PHOSPHORUS mg/dL  --  2.5   TRIGLYCERIDES mg/dL  --  101        Estimated Creatinine Clearance: 63.6 mL/min (A) (by C-G formula based on SCr of 0.7 mg/dL (L)).    Active fluid orders: D5W - order modified to stop before TPN is started    Dietary Orders (From admission, onward)     Start     Ordered    12/03/22 1535  Diet: Liquid Diets; Clear Liquid; Texture: Regular Texture (IDDSI 7); Fluid Consistency: Thin (IDDSI 0)  Diet Effective Now        References:    Diet Order [place]   Question Answer Comment   Diets: Liquid Diets    Liquid Diet: Clear Liquid    Texture: Regular Texture (IDDSI 7)    Fluid Consistency: Thin (IDDSI 0)        12/03/22 1534              Assessment  Ideal Body Weight: 66.1 kg  Body Weight Used for Calculations: 54.5 kg  Daily Est. Luca: 0345-7375 kCal/Day  Estimated Fluid Requirements: 1650 mL/day    Goal   Protein: 1 grams/kg/day (55 grams/day)   Dextrose: 900 Kcal/day   Lipids: 125 ml of 20% lipid infusion 7 days/week    Plan  Will initiate TPN with the following macros:   Protein/Dextrose/Lipids: 55g/900Kcal/25g    Volume: 1680 ml (70 mL/hr over 24 hrs daily)    Based on the above labs, will add the following electrolytes/additives to the TPN.    Sodium Chloride: 60 mEq   Sodium Acetate: 0 mEq   Sodium Phosphate: " 0 mEq   Potassium Chloride: 50 mEq   Potassium Acetate: 0 mEq   Potassium Phosphate: 22 mEq   Calcium Gluconate: 9 mEq   Magnesium Sulfate: 0 mEq   MVI added MWF   Trace Elements              Other Additives:     Continue with same TPN formulation today. CMP, Phos/mg  tomorrow    Pharmacy will continue to follow.     Wilmar Suarez [facility]  Clinical Pharmacist

## 2022-12-04 NOTE — PLAN OF CARE
Goal Outcome Evaluation:pt vs stable, incontinent of bowel and bladder, confused conversation and at times answering orientation questions correctly. Coughs with water sip so swallow eval entered

## 2022-12-04 NOTE — PROGRESS NOTES
Follow-up postop ileus    Subjective:  Today he was able to speak with me, today he is really not able to communicate in any meaningful way.  He is back in restraints.  He has had his nasogastric tube out since yesterday without any vomiting or complaints of abdominal pain.  He continues to have bowel function.  Unfortunately he is really not with it enough to try to eat.    Objective:  Afebrile, vital stable  General: Wakes up and opens his eyes but does not answer any questions at this time, no acute distress  Eyes: Extraocular movements intact without icterus  Abdomen: Soft, no significant tenderness, minimal distention    Assessment and plan:  -Postop ileus after recent spine surgery, this seems to be improving  -Associated encephalopathy resulting in confusion and even some combativeness yesterday.  -Nursing has ordered swallow study which I think is appropriate.  He does not look like he is ready to eat at this time but once is more awake and with it would be appropriate to try a diet.  No plans for any surgical intervention.    Ricky Grover MD  General and Endoscopic Surgery  [facility]    [address], [address]  [place], [place], [address]  P: [phone]  F: [phone]

## 2022-12-04 NOTE — CONSULTS
Patient Identification:  NAME:  Boni Hernandez  Age:  82 y.o.   Sex:  male   :  1940   MRN:  [id number]       Chief complaint: He does not have one, reason for consult change in mental status    History of present illness: Patient is an 82-year-old right-handed white male who comes to the hospital with low back pain going into both legs and is undergone a lumbar laminectomy on 2022 that went very well.  Subsequent to this, though he did develop ileus and has had some urinary retention and has had chronic kidney disease.  I am being called to see him because he has been confused.  Modifying factors really none, some Haldol has been written for but I do not think he has received it yet.  And he is in upper extremity restraints.  Quality agitation, confusion.  He may be very delirious and may be actively hallucinating as well.  Context a patient whose memory is probably not as good as it used to be and I asked him that he agrees.  Location is not pertinent.  Durations been at least the last few days if not longer.  Quality as described.  He does not really have anything to point to.  There is no double vision, change in facial strength focal paralysis of his extremities and again he did very well following his lumbar laminectomy and fusion on .  His QT interval is short/normal      Past medical history:  Past Medical History:   Diagnosis Date   • BPH (benign prostatic hyperplasia)    • Cataract    • Coronary artery disease    • DDD (degenerative disc disease), lumbar    • GERD (gastroesophageal reflux disease)    • History of MI (myocardial infarction)     APPROX. 2 YEARS AGO, HAS STENT, FOLLOWED BY DR LOREDO, 10/21/19 STRESS TEST AT [facility]   • [place] (hard of hearing)    • HTN (hypertension)    • Hyperlipidemia    • Injury of back    • Low back pain     RADIATES DOWN BOTH LEGS   • Spinal stenosis        Past surgical history:  Past Surgical History:   Procedure Laterality Date   • CAROTID  ENDARTERECTOMY Left 2017   • CATARACT EXTRACTION WITH INTRAOCULAR LENS IMPLANT Bilateral    • COLONOSCOPY     • CORONARY ANGIOPLASTY WITH STENT PLACEMENT  2016   • DENTAL PROCEDURE      DENTAL IMPLANTS   • EPIDURAL BLOCK     • LUMBAR DISCECTOMY FUSION INSTRUMENTATION N/A 11/13/2019    Procedure: Lumbar 4 to lumbar 5 laminectomy with a posterior lateral fusion and instrumentation;  Surgeon: Matthew Alex MD;  Location: [facility] OR;  Service: Neurosurgery   • LUMBAR FUSION N/A 11/23/2022    Procedure: Lumbar 3 to lumbar 4 laminectomy with fusion and instrumentation and removal of previous instrumentation at lumbar 4 to lumbar 5;  Surgeon: Matthew Alex MD;  Location: [facility] OR;  Service: Rob[employer] - Neuro;  Laterality: N/A;   • OTHER SURGICAL HISTORY  2015    PT STATES HE HAS HAD STENTS PUT IN BOTH LEGS.    • TONSILLECTOMY         Allergies:  Patient has no known allergies.    Home medications:  Medications Prior to Admission   Medication Sig Dispense Refill Last Dose   • acetaminophen (TYLENOL) 500 MG tablet Take 1,000 mg by mouth Every 6 (Six) Hours As Needed for Mild Pain .   11/22/2022 at 0800   • atorvastatin (LIPITOR) 80 MG tablet Take 80 mg by mouth Every Night.   11/22/2022 at 0700   • carvedilol (COREG) 6.25 MG tablet Take 1 tablet by mouth 2 (Two) Times a Day With Meals.   11/22/2022 at 0430   • isosorbide mononitrate (IMDUR) 30 MG 24 hr tablet Take 2 tablets by mouth Daily.   11/22/2022 at 0430   • lisinopril (PRINIVIL,ZESTRIL) 10 MG tablet Take 1 tablet by mouth Every Night.   11/22/2022 at 1800   • pantoprazole (PROTONIX) 40 MG EC tablet Take 1 tablet by mouth Daily.   11/22/2022 at 0430   • clopidogrel (PLAVIX) 75 MG tablet Take 75 mg by mouth Every Evening. PT TO HOLD 5 DAYS PRIOR TO SURGERY   11/15/2022   • Multiple Vitamins-Minerals (CENTRUM SILVER PO) Take 1 tablet by mouth Daily. TO HOLD 1 WEEK BEFORE SURGERY   11/16/2022   • nitroglycerin (NITROSTAT) 0.4 MG SL tablet Place 0.4 mg  under the tongue Every 5 (Five) Minutes As Needed for Chest Pain. Take no more than 3 doses in 15 minutes.   Unknown        Hospital medications:  bisacodyl, 10 mg, Rectal, Daily  enoxaparin, 40 mg, Subcutaneous, Q24H  Fat Emulsion Plant Based, 100 mL, Intravenous, Q24H (TPN)  methylnaltrexone, 6 mg, Subcutaneous, Every Other Day  multivitamin with minerals, 1 tablet, Oral, Daily  pantoprazole, 40 mg, Intravenous, Q AM  sodium chloride, 10 mL, Intravenous, Q12H  sodium chloride, 3 mL, Intravenous, Q12H  tamsulosin, 0.4 mg, Oral, Daily      Adult Central 2-in-1 TPN, , Last Rate: 70 mL/hr at 22 1857  Adult Central 2-in-1 TPN,   Pharmacy to Dose TPN,       •  acetaminophen  •  haloperidol lactate  •  nitroglycerin  •  ondansetron **OR** ondansetron  •  Pharmacy to Dose TPN  •  sodium chloride  •  sodium chloride  •  sodium chloride  •  sodium chloride    Family history:  Family History   Problem Relation Age of Onset   • Heart attack Son    • Malig Hyperthermia Neg Hx        Social history:  Social History     Tobacco Use   • Smoking status: Former     Packs/day: 2.00     Years: 30.00     Pack years: 60.00     Types: Cigarettes     Quit date:      Years since quittin.9   • Smokeless tobacco: Never   Vaping Use   • Vaping Use: Never used   Substance Use Topics   • Alcohol use: No   • Drug use: No       Review of systems: He cannot tell me much of anything no family is present I reviewed the chart fully    Objective:  Vitals Ranges:   Temp:  [98.2 °F (36.8 °C)-98.6 °F (37 °C)] 98.2 °F (36.8 °C)  Heart Rate:  [] 104  Resp:  [16-18] 18  BP: (130-158)/(59-75) 141/67      Physical Exam:  He is awake and alert and tracks me well.  He counts fingers correctly after it takes him a while.  Fund of knowledge poor attention span concentration poor recent remote memory cannot be tested language dysarthria but not aphasic.  He is able to comprehend name and repeat.  Pupils 3 constricting slightly bilaterally  eyes are conjugate face is symmetrical tongue is midline.  He would not follow any other cranial nerve testing.  Motor good  strength and excellent toe wiggle.  He lifted both legs off the bed without problems.  No atrophy or fasciculations reflexes trace throughout symmetrical right toe probably downgoing left toe may already be upgoing.  Sensation coordination Station and gait completely impossible.  Heart is regular without murmur neck is moderately rigid without bruits extremities no clubbing cyanosis or edema visual acuity he does count fingers correctly at 3 feet  Results review:   I reviewed the patient's new clinical results.    Data review:  Lab Results (last 24 hours)     Procedure Component Value Units Date/Time    Comprehensive Metabolic Panel [[id number]]  (Abnormal) Collected: 12/04/22 0738    Specimen: Blood Updated: 12/04/22 0815     Glucose 135 mg/dL      BUN 16 mg/dL      Creatinine 0.70 mg/dL      Sodium 149 mmol/L      Potassium 3.7 mmol/L      Chloride 114 mmol/L      CO2 26.8 mmol/L      Calcium 8.3 mg/dL      Total Protein 5.0 g/dL      Albumin 2.40 g/dL      ALT (SGPT) 86 U/L      AST (SGOT) 54 U/L      Alkaline Phosphatase 99 U/L      Total Bilirubin 0.2 mg/dL      Globulin 2.6 gm/dL      A/G Ratio 0.9 g/dL      BUN/Creatinine Ratio 22.9     Anion Gap 8.2 mmol/L      eGFR 92.0 mL/min/1.73      Comment: National Kidney Foundation and American Society of Nephrology (ASN) Task Force recommended calculation based on the Chronic Kidney Disease Epidemiology Collaboration (CKD-EPI) equation refit without adjustment for race.       Narrative:      GFR Normal >60  Chronic Kidney Disease <60  Kidney Failure <15    The GFR formula is only valid for adults with stable renal function between ages 18 and 70.    CBC & Differential [[id number]]  (Abnormal) Collected: 12/04/22 0414    Specimen: Blood Updated: 12/04/22 0453    Narrative:      The following orders were created for panel order CBC &  Differential.  Procedure                               Abnormality         Status                     ---------                               -----------         ------                     CBC Auto Differential[[id number]]        Abnormal            Final result                 Please view results for these tests on the individual orders.    CBC Auto Differential [[id number]]  (Abnormal) Collected: 12/04/22 0414    Specimen: Blood Updated: 12/04/22 0453     WBC 7.14 10*3/mm3      RBC 2.62 10*6/mm3      Hemoglobin 7.9 g/dL      Hematocrit 26.5 %      .1 fL      MCH 30.2 pg      MCHC 29.8 g/dL      RDW 14.7 %      RDW-SD 53.5 fl      MPV 9.7 fL      Platelets 232 10*3/mm3      Neutrophil % 73.6 %      Lymphocyte % 16.0 %      Monocyte % 6.2 %      Eosinophil % 2.1 %      Basophil % 0.1 %      Immature Grans % 2.0 %      Neutrophils, Absolute 5.26 10*3/mm3      Lymphocytes, Absolute 1.14 10*3/mm3      Monocytes, Absolute 0.44 10*3/mm3      Eosinophils, Absolute 0.15 10*3/mm3      Basophils, Absolute 0.01 10*3/mm3      Immature Grans, Absolute 0.14 10*3/mm3      nRBC 0.1 /100 WBC            Imaging:  Imaging Results (Last 24 Hours)     ** No results found for the last 24 hours. **           PPE worn was now within 6 feet of them for more than few minutes during my exam no aerosols used at any point.  No aerosols used washed before washed up afterwards appropriately was wearing appropriate mask and gown and protection  Assessment and Plan:   This patient has a significant metabolic encephalopathy with elements of psychosis with some paranoia.  He has been agitated requiring restraint.  Note that this patient almost certainly has some degree of vascular dementia but in this hospitalization.  He has become quite confused and occasionally quite agitated.  Note that his 11/23/2022.  Lumbar laminectomy went very well.  Subsequent to that though he did have an ileus on top of his chronic kidney disease  He is in restraint  now and is able to follow commands and does not look like an acute stroke.  I am going to give him a dose of Zyprexa 5 mg at suppertime and were going to see what he looks like overnight.  Note his QT interval is normal and Zyprexa is a very friendly QT interval.  I will also write for some as needed Zyprexa if needed.          Patrick Wilkinson MD  12/04/22  15:21 EST

## 2022-12-04 NOTE — PROGRESS NOTES
[facility] NEUROSURGERY PROGRESS NOTE    POD #11: s/p L3-L4 bilateral laminectomy and fusion    Interval History: Patient stable.  No acute issues overnight.  Continues to have intermittent confusion.  Apparently, yesterday became very agitated and grabbed the nursing staff, and was also throwing things at them.  For this reason, the patient was placed in restraints.    Vital signs in last 24 hours:  Temp:  [98.2 °F (36.8 °C)-98.6 °F (37 °C)] 98.6 °F (37 °C)  Heart Rate:  [] 90  Resp:  [16-18] 16  BP: (130-158)/(59-75) 158/63    Intake/Output this shift:  No intake/output data recorded.    LABS:  Recent Results (from the past 24 hour(s))   CBC Auto Differential    Collection Time: 12/04/22  4:14 AM    Specimen: Blood   Result Value Ref Range    WBC 7.14 3.40 - 10.80 10*3/mm3    RBC 2.62 (L) 4.14 - 5.80 10*6/mm3    Hemoglobin 7.9 (L) 13.0 - 17.7 g/dL    Hematocrit 26.5 (L) 37.5 - 51.0 %    .1 (H) 79.0 - 97.0 fL    MCH 30.2 26.6 - 33.0 pg    MCHC 29.8 (L) 31.5 - 35.7 g/dL    RDW 14.7 12.3 - 15.4 %    RDW-SD 53.5 37.0 - 54.0 fl    MPV 9.7 6.0 - 12.0 fL    Platelets 232 140 - 450 10*3/mm3    Neutrophil % 73.6 42.7 - 76.0 %    Lymphocyte % 16.0 (L) 19.6 - 45.3 %    Monocyte % 6.2 5.0 - 12.0 %    Eosinophil % 2.1 0.3 - 6.2 %    Basophil % 0.1 0.0 - 1.5 %    Immature Grans % 2.0 (H) 0.0 - 0.5 %    Neutrophils, Absolute 5.26 1.70 - 7.00 10*3/mm3    Lymphocytes, Absolute 1.14 0.70 - 3.10 10*3/mm3    Monocytes, Absolute 0.44 0.10 - 0.90 10*3/mm3    Eosinophils, Absolute 0.15 0.00 - 0.40 10*3/mm3    Basophils, Absolute 0.01 0.00 - 0.20 10*3/mm3    Immature Grans, Absolute 0.14 (H) 0.00 - 0.05 10*3/mm3    nRBC 0.1 0.0 - 0.2 /100 WBC   Comprehensive Metabolic Panel    Collection Time: 12/04/22  7:38 AM    Specimen: Blood   Result Value Ref Range    Glucose 135 (H) 65 - 99 mg/dL    BUN 16 8 - 23 mg/dL    Creatinine 0.70 (L) 0.76 - 1.27 mg/dL    Sodium 149 (H) 136 - 145 mmol/L    Potassium 3.7 3.5 - 5.2 mmol/L     Chloride 114 (H) 98 - 107 mmol/L    CO2 26.8 22.0 - 29.0 mmol/L    Calcium 8.3 (L) 8.6 - 10.5 mg/dL    Total Protein 5.0 (L) 6.0 - 8.5 g/dL    Albumin 2.40 (L) 3.50 - 5.20 g/dL    ALT (SGPT) 86 (H) 1 - 41 U/L    AST (SGOT) 54 (H) 1 - 40 U/L    Alkaline Phosphatase 99 39 - 117 U/L    Total Bilirubin 0.2 0.0 - 1.2 mg/dL    Globulin 2.6 gm/dL    A/G Ratio 0.9 g/dL    BUN/Creatinine Ratio 22.9 7.0 - 25.0    Anion Gap 8.2 5.0 - 15.0 mmol/L    eGFR 92.0 >60.0 mL/min/1.73       IMAGING STUDIES:  XR Abdomen Flat & Upright    Result Date: 12/3/2022   As described.  This report was finalized on 12/3/2022 9:22 AM by Dr. Wes De Guzman M.D.      XR Abdomen KUB    Result Date: 12/2/2022   As described.  This report was finalized on 12/2/2022 7:42 PM by Dr. Wes De Guzman M.D.      XR Abdomen KUB    Result Date: 12/2/2022   As described.  This report was finalized on 12/2/2022 6:01 PM by Dr. Wes De Guzman M.D.      PHYSICAL EXAM:  Mental Status: The patient is awake and alert.  He is confused.  Language is fluent. Speech is clear. The speech is non-dysarthric. Fund of knowledge is normal.  Cranial Nerve I: Not tested.  Cranial Nerve II: The pupils are 3 mm, equally round and reactive to light.   Cranial Nerves III, IV, VI: The extraocular movements are full in all directions of gaze without nystagmus.  Cranial Nerve VII: Facial movements are symmetric  Motor: Tone is normal in all four extremities without fasciculations, atrophy, or myoclonus. There are no involuntary movements.   Muscle Strength: 5/5 muscle strength in bilateral lower extremities.   Sensory: The sensory examination is normal for light touch bilaterally and symmetrically in lower extremities  Incision: The incision is clean and dry with no visible/expressible drainage, surrounding erythema/warmth, or other signs of infection    ASSESSMENT:    Lumbar spinal stenosis    Postoperative urinary retention    Postoperative ileus (HCC)    Leukocytosis  "(leucocytosis)    Spinal stenosis of lumbar region with neurogenic claudication    Severe malnutrition (HCC)    POD #11: s/p L3-L4 bilateral laminectomy and fusion    PLAN:   -cont. PT/OT  -medical management per hospitalist/nephrology/urology/general surgery services  -okay for DC to rehab from a neurosurgical standpoint when deemed medically stable by other services    I discussed the patient's findings and my recommendations with patient, nursing staff and VAN Gallardo  12/4/2022  12:00 EST    \"Dictated utilizing Dragon dictation\".      "

## 2022-12-04 NOTE — PROGRESS NOTES
[facility]    [phone]   LOS: 5 days     Name: Boni Hernandez  Age/Sex: 82 y.o. male  :  1940        PCP: Kiki Weiss APRN  No chief complaint on file.     Subjective     Patient is lying in bed remains confused and NG tube remains in place and is receiving TPN.  Restraints remain in place.    bisacodyl, 10 mg, Rectal, Daily  enoxaparin, 40 mg, Subcutaneous, Q24H  Fat Emulsion Plant Based, 100 mL, Intravenous, Q24H (TPN)  methylnaltrexone, 6 mg, Subcutaneous, Every Other Day  multivitamin with minerals, 1 tablet, Oral, Daily  OLANZapine, 5 mg, Oral, Daily Before Supper  pantoprazole, 40 mg, Intravenous, Q AM  sodium chloride, 10 mL, Intravenous, Q12H  sodium chloride, 3 mL, Intravenous, Q12H  tamsulosin, 0.4 mg, Oral, Daily      Adult Central 2-in-1 TPN, , Last Rate: 70 mL/hr at 22 1857  Adult Central 2-in-1 TPN,   Pharmacy to Dose TPN,         Objective   Vital Signs  Temp:  [98.2 °F (36.8 °C)-98.6 °F (37 °C)] 98.2 °F (36.8 °C)  Heart Rate:  [] 104  Resp:  [16-18] 18  BP: (130-158)/(59-75) 141/67  Body mass index is 19.09 kg/m².    Intake/Output Summary (Last 24 hours) at 2022 1553  Last data filed at 12/3/2022 1701  Gross per 24 hour   Intake 30 ml   Output --   Net 30 ml       Physical Exam  Constitutional:       General: He is not in acute distress.     Appearance: He is ill-appearing.   HENT:      Head: Normocephalic and atraumatic.      Mouth/Throat:      Mouth: Mucous membranes are dry.   Eyes:      Extraocular Movements: Extraocular movements intact.      Pupils: Pupils are equal, round, and reactive to light.   Cardiovascular:      Rate and Rhythm: Regular rhythm. Tachycardia present.      Pulses: Normal pulses.      Heart sounds: Normal heart sounds.   Pulmonary:      Effort: Pulmonary effort is normal. No respiratory distress.   Abdominal:      General: There is distension.      Palpations: Abdomen is soft.      Tenderness: There is abdominal tenderness.       Comments: Belly is soft remains tender throughout buy more tender on the right side this AM   Musculoskeletal:      Cervical back: Neck supple.      Right lower leg: No edema.      Left lower leg: No edema.   Skin:     General: Skin is warm and dry.   Neurological:      General: No focal deficit present.      Comments: Lethargic but improved compared to yesterday           Results Review:       I reviewed the patient's new clinical results.  Results from last 7 days   Lab Units 12/04/22  0414 12/03/22  1159 12/02/22  0744 12/01/22  1111 11/30/22  0717 11/29/22  0644 11/28/22  0644   WBC 10*3/mm3 7.14 6.45 9.26 13.69* 14.19* 13.85* 16.66*   HEMOGLOBIN g/dL 7.9* 8.0* 9.0* 9.1* 8.9* 9.5* 10.3*   PLATELETS 10*3/mm3 232 241 285 297 271 246 254     Results from last 7 days   Lab Units 12/04/22  0738 12/03/22  1159 12/02/22  0744 12/01/22  1602 12/01/22  1111 11/30/22  1745 11/30/22  0717 11/29/22  0644 11/28/22  1624 11/28/22  0644   SODIUM mmol/L 149* 149* 153* 153* 154* 152* 148*  148* 139 139 133*   POTASSIUM mmol/L 3.7 3.6 3.5 3.8 3.8 3.9 3.8 4.5 4.9 4.8   CHLORIDE mmol/L 114* 116* 116* 117* 120* 118* 114* 109* 106 100   CO2 mmol/L 26.8 28.0 26.5 26.9 24.0 22.4 23.0 19.0* 21.0* 22.1   BUN mg/dL 16 18 27* 32* 37* 52* 54* 58* 62* 59*   CREATININE mg/dL 0.70* 0.86 0.88 1.04 0.94 1.07 1.38* 1.39* 1.83* 1.79*   CALCIUM mg/dL 8.3* 8.2* 8.3* 8.4* 8.6 8.9 8.3* 8.1* 8.0* 7.9*   MAGNESIUM mg/dL  --  2.2 2.7*  --  3.0*  --   --  2.8*  --  2.3   PHOSPHORUS mg/dL  --  2.5 2.4*  --  2.7 3.3 3.9  --  3.6 2.8   Estimated Creatinine Clearance: 63.6 mL/min (A) (by C-G formula based on SCr of 0.7 mg/dL (L)).      Assessment & Plan   Active Hospital Problems    Diagnosis  POA   • **Lumbar spinal stenosis [M48.061]  Yes   • Severe malnutrition (HCC) [E43]  Yes   • Spinal stenosis of lumbar region with neurogenic claudication [M48.062]  Yes   • Postoperative urinary retention [N99.89, R33.8]  No   • Postoperative ileus (HCC) [K91.89,  K56.7]  No   • Leukocytosis (leucocytosis) [D72.829]  No      Resolved Hospital Problems    Diagnosis Date Resolved POA   • Hyponatremia [E87.1] 11/30/2022 Yes       PLAN  This is an 82-year-old gentleman who presented to the hospital with a history of lumbar spine stenosis and medical comorbidities including BPH, hypertension and coronary artery disease he underwent surgical correction and is postoperative day 3.  [facility] consulted for hypertension management.      1.  Hypertension  -  Blood pressure in the acceptable range and for the moment will continue with p.r.n. antihypertensives.  -Continue to hold Coreg, Imdur, lisinopril (DC'd for now can add back 1 at a time), NPO with no oral meds at this time    2.   Hypernatremia, sodium is 149 and Nephrology is following along.    3.  Ileus , NG tube remains in place and general surgery is following along.  No return of bowel function yet and continue with TPN.  Ins and outs will be monitored closely and electrolytes will be replaced as needed.    4.  Urinary retention , Dale catheter has been discontinued and is able to void according to nursing staff.  Urology did follow earlier during this hospital stay and is on Flomax.    5.  MESFIN , resolved felt to be multifactorial in etiology.   Creatinine did rise to 1.83 and now it is at 0.70. nephrology following along as well.    6. Suspected aspiration pneumonia, x-ray suggestive of left lower lobe infiltrate.  Currently on Zosyn and will be continued.  MRSA screen was negative therefore vancomycin was discontinued.    7.  Lumbar spine stenosis  -Defer postoperative care to primary team    8.  Metabolic encephalopathy with agitation  -Remains in restraints, started on as needed Haldol and neurology consult has been obtained today.    9. On Lovenox for DVT prophylaxis.      10. Code status is full code.      Austin Moscoso MD  [facility]  12/04/22  15:53 EST

## 2022-12-05 ENCOUNTER — APPOINTMENT (OUTPATIENT)
Dept: CT IMAGING | Facility: HOSPITAL | Age: 82
End: 2022-12-05

## 2022-12-05 LAB
ALBUMIN SERPL-MCNC: 2.4 G/DL (ref 3.5–5.2)
ALBUMIN/GLOB SERPL: 0.9 G/DL
ALP SERPL-CCNC: 106 U/L (ref 39–117)
ALT SERPL W P-5'-P-CCNC: 82 U/L (ref 1–41)
AMMONIA BLD-SCNC: 25 UMOL/L (ref 16–60)
ANION GAP SERPL CALCULATED.3IONS-SCNC: 8.2 MMOL/L (ref 5–15)
AST SERPL-CCNC: 57 U/L (ref 1–40)
BILIRUB SERPL-MCNC: 0.3 MG/DL (ref 0–1.2)
BUN SERPL-MCNC: 20 MG/DL (ref 8–23)
BUN/CREAT SERPL: 27.4 (ref 7–25)
CALCIUM SPEC-SCNC: 8.5 MG/DL (ref 8.6–10.5)
CHLORIDE SERPL-SCNC: 109 MMOL/L (ref 98–107)
CO2 SERPL-SCNC: 25.8 MMOL/L (ref 22–29)
CREAT SERPL-MCNC: 0.73 MG/DL (ref 0.76–1.27)
DEPRECATED RDW RBC AUTO: 57.2 FL (ref 37–54)
EGFRCR SERPLBLD CKD-EPI 2021: 90.8 ML/MIN/1.73
EOSINOPHIL # BLD MANUAL: 0.09 10*3/MM3 (ref 0–0.4)
EOSINOPHIL NFR BLD MANUAL: 1 % (ref 0.3–6.2)
ERYTHROCYTE [DISTWIDTH] IN BLOOD BY AUTOMATED COUNT: 14.8 % (ref 12.3–15.4)
FOLATE SERPL-MCNC: 6.14 NG/ML (ref 4.78–24.2)
GIANT PLATELETS: ABNORMAL
GLOBULIN UR ELPH-MCNC: 2.8 GM/DL
GLUCOSE SERPL-MCNC: 146 MG/DL (ref 65–99)
HCT VFR BLD AUTO: 29.4 % (ref 37.5–51)
HGB BLD-MCNC: 7.9 G/DL (ref 13–17.7)
LYMPHOCYTES # BLD MANUAL: 0.8 10*3/MM3 (ref 0.7–3.1)
LYMPHOCYTES NFR BLD MANUAL: 1 % (ref 5–12)
MAGNESIUM SERPL-MCNC: 1.7 MG/DL (ref 1.6–2.4)
MCH RBC QN AUTO: 28.7 PG (ref 26.6–33)
MCHC RBC AUTO-ENTMCNC: 26.9 G/DL (ref 31.5–35.7)
MCV RBC AUTO: 106.9 FL (ref 79–97)
MONOCYTES # BLD: 0.09 10*3/MM3 (ref 0.1–0.9)
NEUTROPHILS # BLD AUTO: 7.68 10*3/MM3 (ref 1.7–7)
NEUTROPHILS NFR BLD MANUAL: 88.8 % (ref 42.7–76)
NRBC SPEC MANUAL: 1 /100 WBC (ref 0–0.2)
PHOSPHATE SERPL-MCNC: 4.8 MG/DL (ref 2.5–4.5)
PLATELET # BLD AUTO: 250 10*3/MM3 (ref 140–450)
PMV BLD AUTO: 10 FL (ref 6–12)
POTASSIUM SERPL-SCNC: 4.3 MMOL/L (ref 3.5–5.2)
PROT SERPL-MCNC: 5.2 G/DL (ref 6–8.5)
RBC # BLD AUTO: 2.75 10*6/MM3 (ref 4.14–5.8)
RBC MORPH BLD: NORMAL
RPR SER QL: NORMAL
SODIUM SERPL-SCNC: 143 MMOL/L (ref 136–145)
TSH SERPL DL<=0.05 MIU/L-ACNC: 2.88 UIU/ML (ref 0.27–4.2)
VARIANT LYMPHS NFR BLD MANUAL: 9.2 % (ref 19.6–45.3)
VIT B12 BLD-MCNC: 1653 PG/ML (ref 211–946)
WBC MORPH BLD: NORMAL
WBC NRBC COR # BLD: 8.65 10*3/MM3 (ref 3.4–10.8)

## 2022-12-05 PROCEDURE — 82746 ASSAY OF FOLIC ACID SERUM: CPT | Performed by: NURSE PRACTITIONER

## 2022-12-05 PROCEDURE — 99024 POSTOP FOLLOW-UP VISIT: CPT | Performed by: NURSE PRACTITIONER

## 2022-12-05 PROCEDURE — 25010000002 METHYLNALTREXONE 12 MG/0.6ML SOLUTION: Performed by: SURGERY

## 2022-12-05 PROCEDURE — 86592 SYPHILIS TEST NON-TREP QUAL: CPT | Performed by: NURSE PRACTITIONER

## 2022-12-05 PROCEDURE — 85025 COMPLETE CBC W/AUTO DIFF WBC: CPT | Performed by: INTERNAL MEDICINE

## 2022-12-05 PROCEDURE — 83735 ASSAY OF MAGNESIUM: CPT | Performed by: HOSPITALIST

## 2022-12-05 PROCEDURE — 82607 VITAMIN B-12: CPT | Performed by: NURSE PRACTITIONER

## 2022-12-05 PROCEDURE — 70450 CT HEAD/BRAIN W/O DYE: CPT

## 2022-12-05 PROCEDURE — 84100 ASSAY OF PHOSPHORUS: CPT | Performed by: HOSPITALIST

## 2022-12-05 PROCEDURE — 80053 COMPREHEN METABOLIC PANEL: CPT | Performed by: HOSPITALIST

## 2022-12-05 PROCEDURE — 84443 ASSAY THYROID STIM HORMONE: CPT | Performed by: NURSE PRACTITIONER

## 2022-12-05 PROCEDURE — 99232 SBSQ HOSP IP/OBS MODERATE 35: CPT | Performed by: SURGERY

## 2022-12-05 PROCEDURE — 82140 ASSAY OF AMMONIA: CPT | Performed by: NURSE PRACTITIONER

## 2022-12-05 PROCEDURE — 85007 BL SMEAR W/DIFF WBC COUNT: CPT | Performed by: INTERNAL MEDICINE

## 2022-12-05 PROCEDURE — 25010000002 ENOXAPARIN PER 10 MG: Performed by: NURSE PRACTITIONER

## 2022-12-05 PROCEDURE — 99232 SBSQ HOSP IP/OBS MODERATE 35: CPT | Performed by: NURSE PRACTITIONER

## 2022-12-05 RX ORDER — OLANZAPINE 2.5 MG/1
2.5 TABLET ORAL
Status: DISCONTINUED | OUTPATIENT
Start: 2022-12-05 | End: 2022-12-13 | Stop reason: HOSPADM

## 2022-12-05 RX ORDER — DEXTROSE MONOHYDRATE 100 MG/ML
50 INJECTION, SOLUTION INTRAVENOUS CONTINUOUS
Status: DISCONTINUED | OUTPATIENT
Start: 2022-12-05 | End: 2022-12-05

## 2022-12-05 RX ADMIN — MULTIPLE VITAMINS W/ MINERALS TAB 1 TABLET: TAB at 08:30

## 2022-12-05 RX ADMIN — ENOXAPARIN SODIUM 40 MG: 100 INJECTION SUBCUTANEOUS at 17:20

## 2022-12-05 RX ADMIN — Medication 3 ML: at 20:45

## 2022-12-05 RX ADMIN — PANTOPRAZOLE SODIUM 40 MG: 40 INJECTION, POWDER, FOR SOLUTION INTRAVENOUS at 06:43

## 2022-12-05 RX ADMIN — TAMSULOSIN HYDROCHLORIDE 0.4 MG: 0.4 CAPSULE ORAL at 08:30

## 2022-12-05 RX ADMIN — Medication 10 ML: at 20:45

## 2022-12-05 RX ADMIN — BISACODYL 10 MG: 10 SUPPOSITORY RECTAL at 08:30

## 2022-12-05 RX ADMIN — ACETAMINOPHEN 1000 MG: 325 TABLET, FILM COATED ORAL at 21:59

## 2022-12-05 RX ADMIN — OLANZAPINE 2.5 MG: 2.5 TABLET ORAL at 17:14

## 2022-12-05 RX ADMIN — Medication 3 ML: at 08:00

## 2022-12-05 RX ADMIN — Medication 10 ML: at 08:31

## 2022-12-05 RX ADMIN — METHYLNALTREXONE BROMIDE 6 MG: 12 INJECTION, SOLUTION SUBCUTANEOUS at 08:30

## 2022-12-05 NOTE — PROGRESS NOTES
Name: Boni Hernandez ADMIT: 2022   : 1940  PCP: Kiki Weiss APRN    MRN: [id number] LOS: 6 days   AGE/SEX: 82 y.o. male  [facility]     Subjective   Subjective   Patient family bedside.  Asleep and difficult to arouse.      Objective   Objective   Vital Signs  Temp:  [97.6 °F (36.4 °C)-98.7 °F (37.1 °C)] 97.6 °F (36.4 °C)  Heart Rate:  [] 103  Resp:  [16-20] 20  BP: ()/(40-77) 104/40  SpO2:  [98 %-100 %] 99 %  on   ;   Device (Oxygen Therapy): room air  Body mass index is 19.09 kg/m².  Physical exam:  General: Chronically ill-appearing, no acute distress  HEENT: Normocephalic Intermedic  CV: Tachycardic, regular rhythm  Lungs: Normal work of breathing on room air, no signs of respiratory distress  Abdomen: Soft, nontender, nondistended  MSK: no lower extremity edema  Neuro: Unable to assess due to lethargy    Results Review     I reviewed the patient's new clinical results.  Results from last 7 days   Lab Units 22  0643 22  0414 22  1159 22  0744   WBC 10*3/mm3 8.65 7.14 6.45 9.26   HEMOGLOBIN g/dL 7.9* 7.9* 8.0* 9.0*   PLATELETS 10*3/mm3 250 232 241 285     Results from last 7 days   Lab Units 22  0801 22  0738 22  1159 22  0744   SODIUM mmol/L 143 149* 149* 153*   POTASSIUM mmol/L 4.3 3.7 3.6 3.5   CHLORIDE mmol/L 109* 114* 116* 116*   CO2 mmol/L 25.8 26.8 28.0 26.5   BUN mg/dL 20 16 18 27*   CREATININE mg/dL 0.73* 0.70* 0.86 0.88   GLUCOSE mg/dL 146* 135* 169* 177*   Estimated Creatinine Clearance: 61 mL/min (A) (by C-G formula based on SCr of 0.73 mg/dL (L)).  Results from last 7 days   Lab Units 22  0801 22  0738 22  1159 22  0744   ALBUMIN g/dL 2.40* 2.40* 2.50* 2.60*   BILIRUBIN mg/dL 0.3 0.2 0.4 0.6   ALK PHOS U/L 106 99 107 140*   AST (SGOT) U/L 57* 54* 71* 134*   ALT (SGPT) U/L 82* 86* 105* 152*     Results from last 7 days   Lab Units 22  0801 22  0643 22  0738 22  1159  12/02/22  0744 12/01/22  1602 12/01/22  1111   CALCIUM mg/dL 8.5*  --  8.3* 8.2* 8.3*   < > 8.6   ALBUMIN g/dL 2.40*  --  2.40* 2.50* 2.60*  --  2.80*   MAGNESIUM mg/dL  --  1.7  --  2.2 2.7*  --  3.0*   PHOSPHORUS mg/dL  --  4.8*  --  2.5 2.4*  --  2.7    < > = values in this interval not displayed.     Results from last 7 days   Lab Units 11/30/22  1415   LACTATE mmol/L 1.2   No results found for: COVID19  No results found for: HGBA1C, POCGLU    XR Abdomen Flat & Upright  Narrative: XR ABDOMEN FLAT AND UPRIGHT-     INDICATIONS: Ileus, follow-up     TECHNIQUE: Supine and upright views of the abdomen     COMPARISON:  image from CT from 11/30/2022     FINDINGS:     NG tube extends to the proximal to mid stomach. The cecum is gas  distended, but mildly less than on the prior exam. The rest of the colon  is less gas distended. Loops of gas filled small bowel and the left  aspect of the abdomen are noted, with borderline prominent caliber. No  free air is seen under the diaphragm. Follow-up as indications persist.     Impression:    As described.     This report was finalized on 12/3/2022 9:22 AM by Dr. Wes De Guzman M.D.       Scheduled Medications  bisacodyl, 10 mg, Rectal, Daily  enoxaparin, 40 mg, Subcutaneous, Q24H  Fat Emulsion Plant Based, 125 mL, Intravenous, Q24H (TPN)  methylnaltrexone, 6 mg, Subcutaneous, Every Other Day  multivitamin with minerals, 1 tablet, Oral, Daily  OLANZapine, 2.5 mg, Oral, Daily Before Supper  pantoprazole, 40 mg, Intravenous, Q AM  sodium chloride, 10 mL, Intravenous, Q12H  sodium chloride, 3 mL, Intravenous, Q12H  tamsulosin, 0.4 mg, Oral, Daily    Infusions  Adult Central 2-in-1 TPN, , Last Rate: 70 mL/hr at 12/04/22 1700  Adult Central 2-in-1 TPN,   Pharmacy to Dose TPN,     Diet  Diet: Liquid Diets; Clear Liquid; Texture: Regular Texture (IDDSI 7); Fluid Consistency: Thin (IDDSI 0)  Adult Central 2-in-1 TPN  Adult Central 2-in-1 TPN       Assessment/Plan     Active  Hospital Problems    Diagnosis  POA   • **Lumbar spinal stenosis [M48.061]  Yes   • Severe malnutrition (HCC) [E43]  Yes   • Spinal stenosis of lumbar region with neurogenic claudication [M48.062]  Yes   • Postoperative urinary retention [N99.89, R33.8]  No   • Postoperative ileus (HCC) [K91.89, K56.7]  No   • Leukocytosis (leucocytosis) [D72.829]  No      Resolved Hospital Problems    Diagnosis Date Resolved POA   • Hyponatremia [E87.1] 11/30/2022 Yes       82 y.o. male admitted with Lumbar spinal stenosis.    This is an 82-year-old gentleman who presented to the hospital with a history of lumbar spine stenosis and medical comorbidities including BPH, hypertension and coronary artery disease he underwent surgical correction and is postoperative day 3.  A consulted for hypertension management.       1.  Hypertension  -  Blood pressure in the acceptable range and for the moment will continue with p.r.n. antihypertensives.  -Continue to hold Coreg, Imdur, lisinopril, NPO with no oral meds at this time     2.   Hypernatremia, sodium iimproving and Nephrology is following along.     3.  Ileus , NG tube remains in place and general surgery is following along.  TFs to be restarted     4.  Urinary retention , Dale catheter has been discontinued and is able to void according to nursing staff.  Urology did follow earlier during this hospital stay and is on Flomax.     5.  MESFIN , resolved felt to be multifactorial in etiology.   Creatinine did rise to 1.83 and now it is at 0.70. nephrology following along as well.     6. Suspected aspiration pneumonia, x-ray suggestive of left lower lobe infiltrate. Currently on Zosyn and will be continued.  MRSA screen was negative therefore vancomycin was discontinued.     7.  Lumbar spine stenosis  -Defer postoperative care to primary team     8.  Metabolic encephalopathy with agitation  -Remains in restraints, started on as needed Haldol and neurology consult has been obtained  today.         Santo Hines MD  [facility]  12/05/22  13:32 EST   I wore protective equipment throughout this patient encounter including a face mask, gloves and protective eyewear.  Hand hygiene was performed before donning protective equipment and after removal when leaving the room.

## 2022-12-05 NOTE — PROGRESS NOTES
Boni Hernandez   82 y.o.  male    LOS: 6 days   Patient Care Team:  Kiki Weiss APRN as PCP - General (Nurse Practitioner)  Mark Abraham MD as Consulting Physician (Cardiology)      Subjective     Interval History:     Patient Complaints:     Review of Systems:       Medication Review:   Current Facility-Administered Medications:   •  acetaminophen (TYLENOL) tablet 1,000 mg, 1,000 mg, Oral, Q6H PRN, Matthew Alex MD  •  Adult Central 2-in-1 TPN, , Intravenous, Continuous, Mor Yost MD, Last Rate: 70 mL/hr at 12/04/22 1700, New Bag at 12/04/22 1700  •  Adult Central 2-in-1 TPN, , Intravenous, Continuous, Mor Yost MD  •  bisacodyl (DULCOLAX) suppository 10 mg, 10 mg, Rectal, Daily, Corbin Mc MD, 10 mg at 12/05/22 0830  •  Enoxaparin Sodium (LOVENOX) syringe 40 mg, 40 mg, Subcutaneous, Q24H, Sakina Goldstein APRN, 40 mg at 12/04/22 1700  •  Fat Emulsion Plant Based (INTRALIPID,LIPOSYN) 20 % infusion 25 g, 125 mL, Intravenous, Q24H (TPN), Mor Yost MD  •  methylnaltrexone (RELISTOR) injection 6 mg, 6 mg, Subcutaneous, Every Other Day, Corbin Mc MD, 6 mg at 12/05/22 0830  •  multivitamin with minerals 1 tablet, 1 tablet, Oral, Daily, Matthew Alex MD, 1 tablet at 12/05/22 0830  •  nitroglycerin (NITROSTAT) SL tablet 0.4 mg, 0.4 mg, Sublingual, Q5 Min PRN, Matthew Alex MD  •  OLANZapine (zyPREXA) injection 5 mg, 5 mg, Intramuscular, Q4H PRN, Patrick Wilkinson MD, 5 mg at 12/04/22 1850  •  OLANZapine (zyPREXA) tablet 5 mg, 5 mg, Oral, Daily Before Supper, Patrick Wilkinson MD, 5 mg at 12/04/22 1837  •  ondansetron (ZOFRAN) tablet 4 mg, 4 mg, Oral, Q6H PRN **OR** ondansetron (ZOFRAN) injection 4 mg, 4 mg, Intravenous, Q6H PRN, Matthew Alex MD, 4 mg at 11/27/22 1104  •  pantoprazole (PROTONIX) injection 40 mg, 40 mg, Intravenous, Q AM, Mor Yost MD, 40 mg at 12/05/22 0643  •  Pharmacy to Dose TPN, , Does not apply, Continuous PRN,  Mor Yost MD  •  sodium chloride 0.9 % flush 10 mL, 10 mL, Intravenous, PRN, Matthew Alex MD  •  sodium chloride 0.9 % flush 10 mL, 10 mL, Intravenous, Q12H, Mor Yost MD, 10 mL at 12/05/22 0831  •  sodium chloride 0.9 % flush 10 mL, 10 mL, Intravenous, PRN, Mor Yost MD  •  sodium chloride 0.9 % flush 20 mL, 20 mL, Intravenous, PRN, Mor Yost MD  •  sodium chloride 0.9 % flush 3 mL, 3 mL, Intravenous, Q12H, Matthew Alex MD, 3 mL at 12/05/22 0800  •  sodium chloride 0.9 % infusion 40 mL, 40 mL, Intravenous, PRN, Matthew Alex MD  •  tamsulosin (FLOMAX) 24 hr capsule 0.4 mg, 0.4 mg, Oral, Daily, Patrick Henry Jr., MD, 0.4 mg at 12/05/22 0830      Objective   Vital Sign Min/Max for last 24 hours  Temp  Min: 97.6 °F (36.4 °C)  Max: 98.7 °F (37.1 °C)   BP  Min: 96/77  Max: 144/63    Pulse  Min: 95  Max: 104     Wt Readings from Last 3 Encounters:   12/04/22 55.3 kg (121 lb 14.6 oz)   11/10/22 68.9 kg (152 lb)   10/27/22 70.3 kg (155 lb)        Intake/Output Summary (Last 24 hours) at 12/5/2022 1151  Last data filed at 12/5/2022 0800  Gross per 24 hour   Intake --   Output 900 ml   Net -900 ml     Physical Exam:      General Appearance:    Well developed and well nourished in no acute distress   Head:    Normocephalic, atraumatic   Eyes:            Conjunctivae normal, anicteric, no xanthelasma   Neck:   supple, trachea midline, no thyromegaly, no carotid bruit, no JVD, no elevated CVP   Lungs:     Clear to auscultation,respirations regular, even and                  unlabored    Heart:    Regular rhythm and normal rate, normal S1 and S2,            No murmur, no gallop, no rub, no click   Chest Wall:    No abnormalities observed   Abdomen:     Normal bowel sounds, no masses, no organomegaly, soft        nontender, nondistended, no guarding, no rebound                tenderness   Rectal:     Deferred   Extremities:   No edema. Moves all extremities well, no  cyanosis, no erythema   Pulses:   Pulses palpable and equal bilaterally   Skin:   No bleeding, bruising or rash   Neurologic:   awake alert and oriented x3, speech clear and approp, no facial drooping     :    Monitor:      Results Review:         Sodium Sodium   Date Value Ref Range Status   12/05/2022 143 136 - 145 mmol/L Final   12/04/2022 149 (H) 136 - 145 mmol/L Final   12/03/2022 149 (H) 136 - 145 mmol/L Final      Potassium Potassium   Date Value Ref Range Status   12/05/2022 4.3 3.5 - 5.2 mmol/L Final   12/04/2022 3.7 3.5 - 5.2 mmol/L Final   12/03/2022 3.6 3.5 - 5.2 mmol/L Final      Chloride Chloride   Date Value Ref Range Status   12/05/2022 109 (H) 98 - 107 mmol/L Final   12/04/2022 114 (H) 98 - 107 mmol/L Final   12/03/2022 116 (H) 98 - 107 mmol/L Final      Bicarbonate No results found for: PLASMABICARB   BUN BUN   Date Value Ref Range Status   12/05/2022 20 8 - 23 mg/dL Final   12/04/2022 16 8 - 23 mg/dL Final   12/03/2022 18 8 - 23 mg/dL Final      Creatinine Creatinine   Date Value Ref Range Status   12/05/2022 0.73 (L) 0.76 - 1.27 mg/dL Final   12/04/2022 0.70 (L) 0.76 - 1.27 mg/dL Final   12/03/2022 0.86 0.76 - 1.27 mg/dL Final      Calcium Calcium   Date Value Ref Range Status   12/05/2022 8.5 (L) 8.6 - 10.5 mg/dL Final   12/04/2022 8.3 (L) 8.6 - 10.5 mg/dL Final   12/03/2022 8.2 (L) 8.6 - 10.5 mg/dL Final      Magnesium Magnesium   Date Value Ref Range Status   12/05/2022 1.7 1.6 - 2.4 mg/dL Final   12/03/2022 2.2 1.6 - 2.4 mg/dL Final        Results from last 7 days   Lab Units 12/05/22  0643   WBC 10*3/mm3 8.65   HEMOGLOBIN g/dL 7.9*   HEMATOCRIT % 29.4*   PLATELETS 10*3/mm3 250     No results found for: TROPONINT         Echo EF Estimated  No results found for: ECHOEFEST      Assessment/ Plan  Assessment & Plan   Active Hospital Problems    Diagnosis  POA   • **Lumbar spinal stenosis [M48.061]  Yes   • Severe malnutrition (HCC) [E43]  Yes   • Spinal stenosis of lumbar region with neurogenic  claudication [M48.062]  Yes   • Postoperative urinary retention [N99.89, R33.8]  No   • Postoperative ileus (HCC) [K91.89, K56.7]  No   • Leukocytosis (leucocytosis) [D72.829]  No   1. Urinary retention post-op back surgery 11/23 for spinal stenosis  Urology foll  -f/c  -h/o bph     2.  postoperative ileus  -abdominal distention and nausea  -NGT  gen surgery foll     3. Hypertension with hypotension  - holding Coreg, Imdur, lisinopril     4. Hyponatremia/MESFIN  Renal foll- improving with ivf's     5.  Metabolic encephalopathy     6. Possible aspiration pneumonia vs pneumonitis  -XR showed LLL consolidation  -On  zosyn primary foll  -MRSA screen pending     7. hyperlipidemia     8. CAD    A.  stenting of the circumflex in 2016     B. Stenting of the LAD in March 2021     9. appropriate Sinus tachycardia     Plan  He is off DAPT however his stent was more than a year ago.    On liquid diet      Andrade Guerrero MD  12/05/22  11:51 EST

## 2022-12-05 NOTE — PROGRESS NOTES
"DOS: 2022  NAME: Boni Hernandez   : 1940  PCP: Kiki Weiss APRN  Reason for consult: AMS    Neurology    Subjective: Patient received Zyprexa 5 mg yesterday evening, reportedly slept through the night, and is difficult to arouse this morning.  Soft wrist restraints in place.  Reportedly he has throwing utensils at staff.  He has been cleared for an oral diet but has not been eating.  No family at bedside.  Patient not answering question but did follow a few simple commands.Pt seen in follow up today, however the problem is new to the examiner.      Objective:  Vital signs: /40 (BP Location: Left arm, Patient Position: Lying)   Pulse 103   Temp 97.6 °F (36.4 °C) (Oral)   Resp 20   Ht 170.2 cm (67\")   Wt 55.3 kg (121 lb 14.6 oz)   SpO2 99%   BMI 19.09 kg/m²       General appearance: Well developed, well nourished.  NAD.  HEENT: Normocephalic.   Cardiac: Regular rhythm, mildly tachycardic.  Chest Exam: Clear to auscultation bilaterally, no wheezes, no rhonchi.  Extremities: Normal, no edema.   Skin: No rashes or birthmarks.     Higher integrative function: Responds to verbal stimuli but does not open his eyes.  Mumbling, unable to make out speech except for when he told me \" bye.\"  Followed a few simple commands.  Cranial nerves: Visual fields intact to threat bilaterally.  Eyes midline, vestibular ocular reflex intact.  Pupils are equal, round, reactive to light.  Facial movements appear symmetric.  Motor: Equal hand grasp bilaterally, did not move either upper extremity against gravity.  Withdrawals any stimulus in bilateral lower extremities.  Sensation: Intact to light touch in arms and legs.  Station and gait: Deferred.  Muscle stretch reflexes: Difficulty assessing reflexes due to withdrawal to stimuli.  Plantars appear upgoing bilaterally.  Coordination: Not following complex commands.    Laboratory results:  Lab Results   Component Value Date    GLUCOSE 146 (H) 2022    CALCIUM 8.5 " (L) 12/05/2022     12/05/2022    K 4.3 12/05/2022    CO2 25.8 12/05/2022     (H) 12/05/2022    BUN 20 12/05/2022    CREATININE 0.73 (L) 12/05/2022    EGFRIFNONA 100 11/15/2019    BCR 27.4 (H) 12/05/2022    ANIONGAP 8.2 12/05/2022     Lab Results   Component Value Date    WBC 8.65 12/05/2022    HGB 7.9 (L) 12/05/2022    HCT 29.4 (L) 12/05/2022    .9 (H) 12/05/2022     12/05/2022     No results found for: CHOL  No results found for: HDL  No results found for: LDL  Lab Results   Component Value Date    TRIG 101 12/03/2022          Review and interpretation of imaging:  CT Abdomen Pelvis Without Contrast    Result Date: 12/2/2022  CT ABDOMEN AND PELVIS WITHOUT CONTRAST  HISTORY: 82-year-old male with worsening abdominal distention and tenderness.  TECHNIQUE: Axial CT images of the abdomen and pelvis were obtained without administration of intravenous contrast. The patient was not given oral contrast. Coronal and sagittal reformats were obtained.  COMPARISON: None available  FINDINGS: A nasogastric tube is present with tip within the distal stomach. There is mild distention of numerous small bowel loops that demonstrate air-fluid levels and measuring up to 3.5 cm. There is mild circumferential wall thickening seen within a long segment of the terminal ileum approximately 5 to 10 cm best demonstrated on coronal image 57 series 4. The right colon is significantly distended with a transverse diameter of the colon measuring up to 9.7 cm. This transitions to a mildly distended transverse colon that measures up to 5.7 cm and gradually to normal diameter within the left colon. Small amount of ascites is present. Colonic diverticulosis is present.  Noncontrast attenuation of liver is normal. The gallbladder, spleen, pancreas, bilateral adrenal glands are normal. No renal calculi or hydronephrosis. The urinary bladder is partially distended with a catheter in place. The prostate gland is enlarged. There  is marked atherosclerotic change within the abdominal aorta and its branches. Further evaluation is limited in the absence of contrast. There is a right external iliac artery stent in place. There are bilateral small layering pleural effusions with bibasilar atelectasis.      Diffuse dilatation of the small bowel with mild circumferential wall thickening seen within the last 5 to 10 cm of the terminal ileum. Right colon is significantly distended up to 9.6 cm in diameter. This gradually transitions to normal caliber distally most suggestive of an ileus.The wall thickening is demonstrated within the most distal terminal ileum could be inflammatory infectious or ischemic in etiology. Correlation with serum lactate is recommended. Small ascites is present.  Radiation dose reduction techniques were utilized, including automated exposure control and exposure modulation based on body size.  This report was finalized on 12/2/2022 1:41 PM by Dr. Humberto Marin M.D.      XR Abdomen Flat & Upright    Result Date: 12/3/2022  XR ABDOMEN FLAT AND UPRIGHT-  INDICATIONS: Ileus, follow-up  TECHNIQUE: Supine and upright views of the abdomen  COMPARISON:  image from CT from 11/30/2022  FINDINGS:  NG tube extends to the proximal to mid stomach. The cecum is gas distended, but mildly less than on the prior exam. The rest of the colon is less gas distended. Loops of gas filled small bowel and the left aspect of the abdomen are noted, with borderline prominent caliber. No free air is seen under the diaphragm. Follow-up as indications persist.       As described.  This report was finalized on 12/3/2022 9:22 AM by Dr. Wes De Guzman M.D.      XR Abdomen Flat & Upright    Result Date: 11/28/2022  XR ABDOMEN FLAT AND UPRIGHT-clinical: Ileus follow-up  COMPARISON: 11/26/2022  FINDINGS: Nasogastric tube in position.  The degree of dilatation of the descending and sigmoid colon has diminished within the interim. The ascending and  transverse colon appears dilated as before with a cecal diameter of 11.8 cm. Pneumatosis intestinalis involving the ascending colon. Computed tomography recommended as follow-up.  Gas-filled mildly distended loops of small bowel seen in the central abdomen. Soft tissue planes are preserved.  CONCLUSION: Pneumatosis intestinalis of the ascending colon, computed tomography is recommended as follow-up. Cecal diameter is 11.8 cm. Nasogastric tube remains in position.  This report was finalized on 11/28/2022 9:17 AM by Dr. Juan C Campos M.D.      XR Chest 1 View    Result Date: 11/27/2022  SINGLE VIEW OF THE CHEST  HISTORY: Increasing oxygen requirement  COMPARISON: None available.  FINDINGS: Heart size is within normal limits. There is some calcification of the aorta. No pneumothorax or pleural effusion is seen. Patient has some left basilar consolidation. This may represent atelectasis or infiltrate.      Nonspecific left basilar consolidation.  This report was finalized on 11/27/2022 9:11 PM by Dr. Mary Ramos M.D.      XR Abdomen KUB    Result Date: 12/2/2022  XR ABDOMEN KUB-  INDICATIONS: NG tube advanced  TECHNIQUE: Frontal view of the abdomen  COMPARISON: 12/02/2022 at 1745 hours  FINDINGS:  NG tube extends to the proximal stomach. The bowel gas pattern is nonspecific. Follow-up as clinically indicated.       As described.  This report was finalized on 12/2/2022 7:42 PM by Dr. Wes De Guzman M.D.      XR Abdomen KUB    Result Date: 12/2/2022  XR ABDOMEN KUB-  INDICATIONS: NG tube placement  TECHNIQUE: Frontal view of the abdomen  COMPARISON: 11/30/2022 CT  image  FINDINGS:  The NG tube extends to the proximal stomach, with the proximal port at the expected location of the gastroesophageal junction, suggest advancing the tube. The bowel gas pattern is nonspecific. Follow-up as clinically indicated.         As described.  This report was finalized on 12/2/2022 6:01 PM by Dr. Wes De Guzman M.D.       XR Abdomen KUB    Result Date: 11/29/2022  ONE VIEW PORTABLE CHEST AT 4:35 PM  HISTORY: NG tube placement  FINDINGS: An NG tube ends in the mid stomach. Again noted is very prominent gaseous distention of the visualized colon similar to yesterday's examination. There is some slight atelectasis at the left lung base.  This report was finalized on 11/29/2022 5:03 PM by Dr. Brian Bird M.D.      XR Abdomen KUB    Result Date: 11/28/2022  XR ABDOMEN KUB-  INDICATIONS: NG tube placement  TECHNIQUE: Frontal view of the abdomen  COMPARISON: 11/28/2022 at 0838 hours  FINDINGS:  NG tube extends to the gastroduodenal junction region. The bowel gas pattern is nonspecific. Follow-up as clinically indicated.         As described.  This report was finalized on 11/28/2022 1:22 PM by Dr. Wes De Guzman M.D.      XR Abdomen KUB    Result Date: 11/28/2022  KUB  HISTORY: Nasogastric tube placement  COMPARISON: 11/26/2022  FINDINGS: Nasogastric tube has been placed. It is suspected to be positioned within the stomach. Gaseous distention of multiple loops of bowel is noted. There is left basilar consolidation.  This report was finalized on 11/28/2022 1:47 AM by Dr. Mary Ramos M.D.      XR Abdomen KUB    Result Date: 11/26/2022  KUB  HISTORY: Abdominal distention  COMPARISON: None available.  FINDINGS: The patient has diffuse gaseous distention of multiple loops of bowel appearance is very suggestive of ileus, although there is a large volume of stool identified within the ascending colon. Patient did have gaseous distention on an exam from 11/24/2022. Right common iliac stent is noted. No definite free air is seen beneath the diaphragm. There are changes of prior lumbar spinal fusion extending from L3 through L5. This hardware appears stable when compared to prior study.      Diffuse gaseous distention of multiple loops of bowel, suggesting ileus. There is also some increased stool burden which is noted within the  ascending colon.  This report was finalized on 11/26/2022 8:23 PM by Dr. Mary Ramos M.D.        Impression:  Mr. Bo is an 82-year-old male with HTN, hyperlipidemia, BPH, CAD status post MI and stent, PAD status post left CEA 2017, and lumbar stenosis who was admitted 11/3 to undergo lumbar laminectomy and fusion.  Postop course has been complicated by hypertension, hypo and hypernatremia, MESFIN, urinary retention, postop ileus, and suspected aspiration pneumonia (s/p treatment with Zosyn).  Neurology was consulted 12/4 as he has had confusion since surgery which was attributed to metabolic encephalopathy with question of baseline cognitive impairment.  The patient was started on Zyprexa 5 mg daily with dinner 12/4 and is drowsy today despite sleeping through the night.  Per review of the chart the patient was living independently at home with his wife prior to this admission with no pre-existing diagnosis of dementia.    Work-up  Labs: Folate 6.14, B12 1653, ammonia level 25, TSH 2.88    Diagnosis:  AMS, etiology not entirely clear at this point though there is felt to be a component of toxic metabolic encephalopathy however sodium is now normal and MESFIN is resolved    Plan:   Check CT head  Check RPR  Decrease Zyprexa to 2.5 mg daily  If CT head unrevealing and patient not starting to improve will consider further evaluation for AMS  Discussed with Dr. Colin today.  Will follow.

## 2022-12-05 NOTE — PLAN OF CARE
Problem: Restraint, Nonbehavioral (Nonviolent)  Goal: Absence of Harm or Injury  Intervention: Implement Least Restrictive Safety Strategies  Description: Consider personal and environmental factors contributing to nonviolent or self-destructive behavior.  Utilize diversional activity/alternative device protection.  Document alternative strategies and less restrictive intervention attempts and their effects.  Clearly describe/record behavior leading to need for restraint.  Use the least restrictive method of restraint.  Recognize restraint should only be used if needed to improve the patient's wellbeing and less restrictive interventions have been determined to be ineffective.  Reassess continued need frequently. Remove restraint as soon as unsafe situation is resolved.  Recent Flowsheet Documentation  Taken 12/5/2022 0622 by David De La Rosa RN  Medical Device Protection: IV pole/bag removed from visual field  Taken 12/5/2022 0600 by David De La Rosa RN  Medical Device Protection: IV pole/bag removed from visual field  Less Restrictive Alternative:   appropriate expression promoted   bed alarm in use   calming techniques promoted  De-Escalation Techniques:   appropriate behavior reinforced   diversional activity encouraged  Diversional Activities: television  Taken 12/5/2022 0429 by David De La Rosa RN  Medical Device Protection: IV pole/bag removed from visual field  Taken 12/5/2022 0400 by David De La Rosa RN  Medical Device Protection: IV pole/bag removed from visual field  Less Restrictive Alternative:   bed alarm in use   appropriate expression promoted   calming techniques promoted  De-Escalation Techniques:   appropriate behavior reinforced   diversional activity encouraged  Diversional Activities: television  Taken 12/5/2022 0223 by David De La Rosa RN  Medical Device Protection: IV pole/bag removed from visual field  Taken 12/5/2022 0200 by David De La Rosa, RN  Medical Device Protection: IV pole/bag  removed from visual field  Less Restrictive Alternative:   appropriate expression promoted   bed alarm in use   calming techniques promoted  De-Escalation Techniques:   appropriate behavior reinforced   diversional activity encouraged  Diversional Activities: television  Taken 12/5/2022 0059 by David De La Rosa RN  Medical Device Protection: IV pole/bag removed from visual field  Diversional Activities: television  Taken 12/5/2022 0000 by David De La Rosa, RN  Medical Device Protection: IV pole/bag removed from visual field  Less Restrictive Alternative:   appropriate expression promoted   bed alarm in use   calming techniques promoted  De-Escalation Techniques:   reoriented   stimulation decreased   verbally redirected  Diversional Activities: television  Taken 12/4/2022 2210 by David De La Rosa RN  Medical Device Protection: IV pole/bag removed from visual field  Taken 12/4/2022 2200 by David De La Rosa RN  Medical Device Protection: IV pole/bag removed from visual field  Less Restrictive Alternative:   appropriate expression promoted   bed alarm in use   calming techniques promoted  De-Escalation Techniques:   reoriented   appropriate behavior reinforced   stimulation decreased  Diversional Activities: television  Taken 12/4/2022 2023 by David De La Rosa RN  Medical Device Protection: IV pole/bag removed from visual field  Diversional Activities: television  Taken 12/4/2022 2000 by David De La Rosa RN  Medical Device Protection: IV pole/bag removed from visual field  Less Restrictive Alternative:   appropriate expression promoted   bed alarm in use   calming techniques promoted  De-Escalation Techniques:   appropriate behavior reinforced   diversional activity encouraged  Diversional Activities: television  Intervention: Protect Dignity, Rights, and Personal Wellbeing  Description: Explain restraint rationale and procedure to patient and family/support person prior to application.  Regularly assess personal needs  and for changes in behavior and clinical condition, as well as physical and emotional wellbeing.  Provide reassurance and emotional support.  Recent Flowsheet Documentation  Taken 12/5/2022 0059 by David De La Rosa RN  Trust Relationship/Rapport:   choices provided   care explained  Taken 12/4/2022 2023 by David De La Rosa RN  Trust Relationship/Rapport:   care explained   choices provided  Intervention: Protect Skin and Joint Integrity  Description: Frequently check restraint application site and document findings.  Release and replace at regular intervals per facility protocol.  Assist with frequent joint range of motion activity.  Recent Flowsheet Documentation  Taken 12/5/2022 0622 by David De La Rosa RN  Body Position:   tilted   left  Taken 12/5/2022 0429 by David De La Rosa RN  Body Position: (pt floated) other (see comments)  Taken 12/5/2022 0223 by David De La Rosa RN  Body Position: supine, legs elevated  Taken 12/5/2022 0059 by David De La Rosa RN  Body Position:   side-lying   left  Taken 12/4/2022 2210 by David De La Rosa RN  Body Position:   side-lying   right  Taken 12/4/2022 2023 by David De La Rosa RN  Body Position: supine, legs elevated   Goal Outcome Evaluation:   Patient confused to situation this shift, patient still in restraints trying several times to get out of the bed. VSS, bed alarm set, and call light in reach

## 2022-12-05 NOTE — PROGRESS NOTES
"NEUROSURGERY POST OP PROGRESS NOTE     LOS: 6 days   Patient Care Team:  Kiki Weiss APRN as PCP - General (Nurse Practitioner)  Mark Abraham MD as Consulting Physician (Cardiology)      Interval History:     History taken from: patient chart    Objective      Vital Signs  Temp:  [97.8 °F (36.6 °C)-98.7 °F (37.1 °C)] 97.8 °F (36.6 °C)  Heart Rate:  [] 103  Resp:  [16-18] 18  BP: ()/(63-77) 135/69    Physical Exam:     AA&O x 2. Year \"1998.\"   Followed commands in both legs and R arm.   Currently on TPN and lipids IV.   Chronic weakness L arm.   Lumbar dressing dry and intact. No redness, swelling or drainage.   No calf swelling or tenderness to bilateral palpation.   Incontinent of urine. Wearing a brief.        Results Review:     I reviewed the patient's new clinical results.     .  Results from last 7 days   Lab Units 12/05/22  0643 12/04/22  0414 12/03/22  1159   WBC 10*3/mm3 8.65 7.14 6.45   HEMOGLOBIN g/dL 7.9* 7.9* 8.0*   HEMATOCRIT % 29.4* 26.5* 25.0*   PLATELETS 10*3/mm3 250 232 241     .  Results from last 7 days   Lab Units 12/05/22  0801 12/04/22  0738 12/03/22  1159   SODIUM mmol/L 143 149* 149*   POTASSIUM mmol/L 4.3 3.7 3.6   CHLORIDE mmol/L 109* 114* 116*   CO2 mmol/L 25.8 26.8 28.0   BUN mg/dL 20 16 18   CREATININE mg/dL 0.73* 0.70* 0.86   GLUCOSE mg/dL 146* 135* 169*   CALCIUM mg/dL 8.5* 8.3* 8.2*     XR ABDOMEN FLAT AND UPRIGHT 12/03/22    Cecum gas distension still present but slightly improved. Remainder of colon less distended. Loops of gas filled small bowel and left aspect of abdomen noted. No free air.       Assessment & Plan       Lumbar spinal stenosis    Postoperative urinary retention    Postoperative ileus (HCC)    Leukocytosis (leucocytosis)    Spinal stenosis of lumbar region with neurogenic claudication    Severe malnutrition (HCC)    POD 12 bilateral laminectomy, neurolysis of L4 nerve root    Immobilization syndrome due to confusion, metabolic encephalopathy  Post op " ileus, resolving  Post op urinary retention - banda  Suspected asp pneumonia  On Zosyn    Neurology following encephalopathy - stopped Zyprexa  Continue to monitor H/H, CBC in am  Continue Lovenox for DVT prophylaxis  Continue mobilization attempts  Will need rehab     Sakina Goldstein, APRN  12/05/22  09:51 EST

## 2022-12-05 NOTE — PLAN OF CARE
Goal Outcome Evaluation:  Plan of Care Reviewed With: patient           Outcome Evaluation: Orders received, chart reviewed.  Discussed with RN.  Pt receiving cortrak.  Not appropriate for swallow eval.  Recommends to check back tomorrow.

## 2022-12-05 NOTE — PROGRESS NOTES
[facility] Progress Note      Patient Name: Boni Hernandez  : 1940  MRN: [id number]  Primary Care Physician:  Kiki Weiss APRN  Date of admission: 2022    Subjective     Interval History:   Follow up hyponatremia  Patient resting comfortably.  Laying down in bed.  Sleepy.  No new issues overnight    Review of Systems:   Not obtainable    Objective     Vitals:   Temp:  [97.8 °F (36.6 °C)-98.7 °F (37.1 °C)] 97.8 °F (36.6 °C)  Heart Rate:  [] 103  Resp:  [16-18] 18  BP: ()/(63-77) 135/69    Intake/Output Summary (Last 24 hours) at 2022 0833  Last data filed at 2022 0300  Gross per 24 hour   Intake --   Output 700 ml   Net -700 ml       Physical Exam:    Constitutional: confused, chronically ill  HEENT: Sclera anicteric, oral mucosa dry, edentulous.   Neck:  no JVD  Heart : RRR, no s3  Lungs: a few crackles; no wheezing; not labored on room air  Abd: BS absent, soft, grimaces with palpation, + distended  : No palpable bladder  Ext: No edema.  Neurologic: moving all extremities  Skin: Warm and dry      Scheduled Meds:     bisacodyl, 10 mg, Rectal, Daily  enoxaparin, 40 mg, Subcutaneous, Q24H  Fat Emulsion Plant Based, 100 mL, Intravenous, Q24H (TPN)  methylnaltrexone, 6 mg, Subcutaneous, Every Other Day  multivitamin with minerals, 1 tablet, Oral, Daily  OLANZapine, 5 mg, Oral, Daily Before Supper  pantoprazole, 40 mg, Intravenous, Q AM  sodium chloride, 10 mL, Intravenous, Q12H  sodium chloride, 3 mL, Intravenous, Q12H  tamsulosin, 0.4 mg, Oral, Daily      IV Meds:   Adult Central 2-in-1 TPN, , Last Rate: 70 mL/hr at 22 1700  Pharmacy to Dose TPN,         Results Reviewed:   I have personally reviewed the results from the time of this admission to 2022 08:33 EST     Results from last 7 days   Lab Units 22  0801 22  0738 22  1159   SODIUM mmol/L 143 149* 149*   POTASSIUM mmol/L 4.3 3.7 3.6   CHLORIDE mmol/L 109* 114* 116*    CO2 mmol/L 25.8 26.8 28.0   BUN mg/dL 20 16 18   CREATININE mg/dL 0.73* 0.70* 0.86   CALCIUM mg/dL 8.5* 8.3* 8.2*   BILIRUBIN mg/dL 0.3 0.2 0.4   ALK PHOS U/L 106 99 107   ALT (SGPT) U/L 82* 86* 105*   AST (SGOT) U/L 57* 54* 71*   GLUCOSE mg/dL 146* 135* 169*       Estimated Creatinine Clearance: 61 mL/min (A) (by C-G formula based on SCr of 0.73 mg/dL (L)).    Results from last 7 days   Lab Units 12/05/22  0643 12/03/22  1159 12/02/22  0744   MAGNESIUM mg/dL 1.7 2.2 2.7*   PHOSPHORUS mg/dL 4.8* 2.5 2.4*       Results from last 7 days   Lab Units 11/30/22  0717   URIC ACID mg/dL 5.9       Results from last 7 days   Lab Units 12/05/22  0643 12/04/22  0414 12/03/22  1159 12/02/22  0744 12/01/22  1111   WBC 10*3/mm3 8.65 7.14 6.45 9.26 13.69*   HEMOGLOBIN g/dL 7.9* 7.9* 8.0* 9.0* 9.1*   PLATELETS 10*3/mm3 250 232 241 285 297             Assessment / Plan     ASSESSMENT:  1.    Hypernatremia.  Intravascular volume depletion. Improving slowly.  Patient receiving free water replacement with TPN now  2.  MESFIN, nonoliguric, resolved:  3.  Lumbar spinal stenosis s/p laminectomy and facetectomy on 11/23  4.  Postoperative ileus: NG tube in place  5.  Hypertension, controlled.    6.  Urinary retention, with previous Dale catheter  7.  Anemia, stable  8. Elevated transaminases     PLAN:    1.  Continue Free water replacement with TPN  2.  Surveillance lab    Aileen Hogan MD  12/05/22  08:33 EST    [facility]  [phone]

## 2022-12-05 NOTE — CASE MANAGEMENT/SOCIAL WORK
Continued Stay Note  [facility]     Patient Name: Boni Hernandez  MRN: [id number]  Today's Date: 12/5/2022    Admit Date: 11/23/2022    Plan: SNF   Discharge Plan     Row Name 12/05/22 1742       Plan    Plan SNF    Plan Comments Starting cortrak with tube feeding.  Pt remains in restraints at this time.  Referrals pending to [facility] and [facility].  CCP continues to follow. ..........Halley KIRK/ CCP               Discharge Codes    No documentation.               Expected Discharge Date and Time     Expected Discharge Date Expected Discharge Time    Dec 3, 2022             Halley Pickard RN

## 2022-12-05 NOTE — PROGRESS NOTES
"CC: Postoperative ileus    S: No events overnight.  The patient is having bowel function.  Continues to be lethargic.  No nausea or vomiting    Diet:   Diet Order   Procedures   • Diet: Liquid Diets; Clear Liquid; Texture: Regular Texture (IDDSI 7); Fluid Consistency: Thin (IDDSI 0)       I/O last 3 completed shifts:  In: -   Out: 700 [Urine:700]  I/O this shift:  In: -   Out: 200 [Urine:200]    O:/69 (BP Location: Right arm, Patient Position: Lying)   Pulse 103   Temp 97.8 °F (36.6 °C) (Oral)   Resp 18   Ht 170.2 cm (67\")   Wt 55.3 kg (121 lb 14.6 oz)   SpO2 99%   BMI 19.09 kg/m²   Body mass index is 19.09 kg/m².  GENERAL: Lethargic, does not follow commands   HEENT: normochephalic, atraumatic, moist mucus membranes, clear sclera    ABDOMEN: soft, mildly tender, nondistended, no masses or organomegaly  EXTREMITIES: no cyanosis, clubbing or edema    SKIN: Warm and moist, no rashes     Results from last 7 days   Lab Units 12/05/22  0643   WBC 10*3/mm3 8.65   HEMOGLOBIN g/dL 7.9*   HEMATOCRIT % 29.4*   PLATELETS 10*3/mm3 250     Results from last 7 days   Lab Units 12/05/22  0801 12/04/22  0738 12/03/22  1159   SODIUM mmol/L 143 149* 149*   POTASSIUM mmol/L 4.3 3.7 3.6   CHLORIDE mmol/L 109* 114* 116*   CO2 mmol/L 25.8 26.8 28.0   BUN mg/dL 20 16 18   CREATININE mg/dL 0.73* 0.70* 0.86   CALCIUM mg/dL 8.5* 8.3* 8.2*   BILIRUBIN mg/dL 0.3 0.2 0.4   ALK PHOS U/L 106 99 107   ALT (SGPT) U/L 82* 86* 105*   AST (SGOT) U/L 57* 54* 71*   GLUCOSE mg/dL 146* 135* 169*       ROS:   Unable to obtain    A/P: 82 y.o. male with ileus after spine surgery that seems to be resolved now.  Patient continues to be pretty lethargic and unlikely to be able to eat or drink anything.  He is having bowel function and I think his ileus is resolved.  We will place consult for nutrition to place core track and start tube feeds      -Core track tube placement and tube feeds  - Encourage ambulation  - SCDs  - DVT " prophylaxis      Corbin Mc MD  General, Minimally Invasive and Endoscopic Surgery  [facility]    [address], [address]  [place], [place], [address]  P: [phone]  F: [phone]

## 2022-12-05 NOTE — PROGRESS NOTES
"[facility] Clinical Pharmacy Services: Total Parental Nutrition Initial Consult    Indication: Bowel Obstruction  Route: central  Type: standard    Relevant clinical data and objective history reviewed:  82 y.o. male 170.2 cm (67\") 55.3 kg (121 lb 14.6 oz)    Results from last 7 days   Lab Units 12/05/22  0801 12/05/22  0643 12/04/22  0738 12/03/22  1159   SODIUM mmol/L 143  --    < > 149*   POTASSIUM mmol/L 4.3  --    < > 3.6   CHLORIDE mmol/L 109*  --    < > 116*   CO2 mmol/L 25.8  --    < > 28.0   BUN mg/dL 20  --    < > 18   CREATININE mg/dL 0.73*  --    < > 0.86   CALCIUM mg/dL 8.5*  --    < > 8.2*   ALBUMIN g/dL 2.40*  --    < > 2.50*   BILIRUBIN mg/dL 0.3  --    < > 0.4   ALK PHOS U/L 106  --    < > 107   ALT (SGPT) U/L 82*  --    < > 105*   AST (SGOT) U/L 57*  --    < > 71*   GLUCOSE mg/dL 146*  --    < > 169*   MAGNESIUM mg/dL  --  1.7  --  2.2   PHOSPHORUS mg/dL  --  4.8*  --  2.5   TRIGLYCERIDES mg/dL  --   --   --  101    < > = values in this interval not displayed.        Estimated Creatinine Clearance: 61 mL/min (A) (by C-G formula based on SCr of 0.73 mg/dL (L)).    Active fluid orders: None    Dietary Orders (From admission, onward)       Start     Ordered    12/03/22 1535  Diet: Liquid Diets; Clear Liquid; Texture: Regular Texture (IDDSI 7); Fluid Consistency: Thin (IDDSI 0)  Diet Effective Now        References:    Diet Order Crosswalk   Question Answer Comment   Diets: Liquid Diets    Liquid Diet: Clear Liquid    Texture: Regular Texture (IDDSI 7)    Fluid Consistency: Thin (IDDSI 0)        12/03/22 1534                  Assessment  Ideal Body Weight: 66.1 kg  Body Weight Used for Calculations: 54.5 kg  Daily Est. Luca: 0357-4324 kCal/Day  Estimated Fluid Requirements: 1650 mL/day    Goal per Nutrition:   Protein: 80 grams/day    Dextrose: 915 Kcal/day   Lipids: 200 ml of 20% lipid infusion     Plan  Will taper up as appropriate. Will initiate TPN with the following " macros:   Protein/Dextrose/Lipids: 60g/900Kcal/25g    Volume: 1680 ml (70 mL/hr over 24 hrs daily)    Based on the above labs, will add the following electrolytes/additives to the TPN.    Sodium Chloride: 60 mEq   Sodium Acetate: 0 mEq   Sodium Phosphate: 0 mEq   Potassium Chloride: 50 mEq   Potassium Acetate: 0 mEq   Potassium Phosphate: 15 mEq   Calcium Gluconate: 9 mEq   Magnesium Sulfate: 0 mEq   MVI added [facility]   Trace Elements    Labs to be ordered: CMP, Phos/Mg, TG weekly    Potassium Phosphate decreased slightly for elevated phosphate and potasium trending upwards. Calcium corrects to normal so will continue as ordered. Protein increased slightly to get closer to goal.     Pharmacy will continue to follow.     Montrell Mercer [facility]  Clinical Pharmacist

## 2022-12-05 NOTE — PLAN OF CARE
Goal Outcome Evaluation:               Patient is lethargic, drowsy this shift. Unable to rouse for meals or SLP. Patient is to receive a core trak this afternoon and will start TF in place of TPN.     Patient is confused but without complaints. Vital signs are stable. He remains uncooperative with safety and medical devices, remaining in restraints this shift, but is released when nurse is providing care for ROM.     Patient denies any needs at this time and RN will continue to round on this patient per hospital prtocol.

## 2022-12-05 NOTE — PROGRESS NOTES
Nutrition Services    Patient Name:  Boni Hernandez  YOB: 1940  MRN: [id number]  Admit Date:  11/23/2022      Comment: Nutrition consult for cortrak placement and TF assessment.  Per Dr. Mc's notes, ileus now resolved.  TPN running currently.  Lethargic, intermittent confusion.  Having bowel function.    Cortrak placed this afternoon, gastric placement.  Cortrak bridled in place.  TFs okay to begin.  Recommend starting TFs of Nutren 1.5 at 20 mL/hour and advancing by 10 mL q 8 hours to goal rate of 45 mL/hour.  Recommend free water flushes of 35 mL q hour.    Recommend weaning TPN.    RD to continue to follow closely.      CLINICAL NUTRITION ASSESSMENT      Reason for Assessment Cortrak Placement, Physician Consult, Tube Feeding Assessment      Diagnosis/Problem   Lumbar spinal stenosis, post op ileus - now resolved per Dr. Mc's notes   Medical/Surgical History Past Medical History:   Diagnosis Date   • BPH (benign prostatic hyperplasia)    • Cataract    • Coronary artery disease    • DDD (degenerative disc disease), lumbar    • GERD (gastroesophageal reflux disease)    • History of MI (myocardial infarction)     APPROX. 2 YEARS AGO, HAS STENT, FOLLOWED BY DR LOREDO, 10/21/19 STRESS TEST AT [facility]   • [place] (hard of hearing)    • HTN (hypertension)    • Hyperlipidemia    • Injury of back    • Low back pain     RADIATES DOWN BOTH LEGS   • Spinal stenosis        Past Surgical History:   Procedure Laterality Date   • CAROTID ENDARTERECTOMY Left 2017   • CATARACT EXTRACTION WITH INTRAOCULAR LENS IMPLANT Bilateral    • COLONOSCOPY     • CORONARY ANGIOPLASTY WITH STENT PLACEMENT  2016   • DENTAL PROCEDURE      DENTAL IMPLANTS   • EPIDURAL BLOCK     • LUMBAR DISCECTOMY FUSION INSTRUMENTATION N/A 11/13/2019    Procedure: Lumbar 4 to lumbar 5 laminectomy with a posterior lateral fusion and instrumentation;  Surgeon: Matthew Alex MD;  Location: [facility];  Service: Neurosurgery   • LUMBAR FUSION  "N/A 11/23/2022    Procedure: Lumbar 3 to lumbar 4 laminectomy with fusion and instrumentation and removal of previous instrumentation at lumbar 4 to lumbar 5;  Surgeon: Matthew Alex MD;  Location: [facility];  Service: [employer] - Neuro;  Laterality: N/A;   • OTHER SURGICAL HISTORY  2015    PT STATES HE HAS HAD STENTS PUT IN BOTH LEGS.    • TONSILLECTOMY          Encounter Information        Nutrition History:  NPO/CLD x 8 days, TPN started 12/2   Food Preferences:    Supplements:    Factors Affecting Intake: altered GI function, altered mental status     Anthropometrics        Current Height  Current Weight  BMI kg/m2 Height: 170.2 cm (67\")  Weight: 55.3 kg (121 lb 14.6 oz) (12/04/22 0643)  Body mass index is 19.09 kg/m².   Adjusted BMI (if applicable)        Admission Weight 121 lb (12/4)       Ideal Body Weight (IBW) 148 lb (67.3 kg)   Adjusted IBW (if applicable)        Usual Body Weight (UBW) 120-161 lb   Weight Change/Trend Loss       Weight History Wt Readings from Last 30 Encounters:   12/04/22 0643 55.3 kg (121 lb 14.6 oz)   12/03/22 0500 55.2 kg (121 lb 11.1 oz)   12/02/22 0600 54.5 kg (120 lb 2.4 oz)   11/23/22 0757 68 kg (150 lb)   11/10/22 1241 68.9 kg (152 lb)   10/27/22 1145 70.3 kg (155 lb)   10/20/22 0619 70.3 kg (155 lb)   03/13/22 1012 68.5 kg (151 lb 0.2 oz)   02/01/22 1037 68.5 kg (151 lb)   01/06/20 0819 68.5 kg (151 lb)   12/04/19 1117 68.5 kg (151 lb)   11/13/19 1000 72.7 kg (160 lb 6 oz)   11/06/19 0640 73.3 kg (161 lb 8 oz)   10/29/19 1105 72.6 kg (160 lb)   10/17/19 0617 72.6 kg (160 lb)   07/23/19 1015 72.1 kg (159 lb)   05/16/19 0947 72.1 kg (159 lb)           --  Estimated/Assessed Needs       Energy Requirements    Height for Calculation  Height: 170.2 cm (67\")   Weight for Calculation 121 lb (55.3 kg)   Method for Estimation  25 kcal/kg, 30 kcal/kg   EST Needs (kcal/day) 7579-0127       Protein Requirements    Weight for Calculation 121 lb (55.3 kg)   EST Protein Needs (g/kg) " 1.2 - 1.5 gm/kg   EST Daily Needs (g/day) 66-83       Fluid Requirements     Method for Estimation 30 mL/kg    Estimated Needs (mL/day) 1700     Tests/Procedures        Tests/Procedures CT scan, X-Ray     Labs       Pertinent Labs    Results from last 7 days   Lab Units 12/05/22  0801 12/04/22  0738 12/03/22  1159   SODIUM mmol/L 143 149* 149*   POTASSIUM mmol/L 4.3 3.7 3.6   CHLORIDE mmol/L 109* 114* 116*   CO2 mmol/L 25.8 26.8 28.0   BUN mg/dL 20 16 18   CREATININE mg/dL 0.73* 0.70* 0.86   CALCIUM mg/dL 8.5* 8.3* 8.2*   BILIRUBIN mg/dL 0.3 0.2 0.4   ALK PHOS U/L 106 99 107   ALT (SGPT) U/L 82* 86* 105*   AST (SGOT) U/L 57* 54* 71*   GLUCOSE mg/dL 146* 135* 169*     Results from last 7 days   Lab Units 12/05/22  0801 12/05/22  0643 12/04/22 0414 12/03/22  1159 12/02/22  0744   MAGNESIUM mg/dL  --  1.7  --  2.2 2.7*   PHOSPHORUS mg/dL  --  4.8*  --  2.5 2.4*   HEMOGLOBIN g/dL  --  7.9*   < > 8.0* 9.0*   HEMATOCRIT %  --  29.4*   < > 25.0* 28.8*   WBC 10*3/mm3  --  8.65   < > 6.45 9.26   TRIGLYCERIDES mg/dL  --   --   --  101  --    ALBUMIN g/dL 2.40*  --    < > 2.50* 2.60*    < > = values in this interval not displayed.     Results from last 7 days   Lab Units 12/05/22  0643 12/04/22 0414 12/03/22  1159 12/02/22  0744 12/01/22  1111   PLATELETS 10*3/mm3 250 232 241 285 297     No results found for: COVID19  Lab Results   Component Value Date    HGBA1C 5.30 11/28/2022          Medications           Scheduled Medications bisacodyl, 10 mg, Rectal, Daily  enoxaparin, 40 mg, Subcutaneous, Q24H  Fat Emulsion Plant Based, 125 mL, Intravenous, Q24H (TPN)  methylnaltrexone, 6 mg, Subcutaneous, Every Other Day  multivitamin with minerals, 1 tablet, Oral, Daily  OLANZapine, 2.5 mg, Oral, Daily Before Supper  pantoprazole, 40 mg, Intravenous, Q AM  sodium chloride, 10 mL, Intravenous, Q12H  sodium chloride, 3 mL, Intravenous, Q12H  tamsulosin, 0.4 mg, Oral, Daily       Infusions Adult Central 2-in-1 TPN, , Last Rate: 70 mL/hr  at 12/04/22 1700  Adult Central 2-in-1 TPN,   Pharmacy to Dose TPN,        PRN Medications •  acetaminophen  •  nitroglycerin  •  OLANZapine  •  ondansetron **OR** ondansetron  •  Pharmacy to Dose TPN  •  sodium chloride  •  sodium chloride  •  sodium chloride  •  sodium chloride     Physical Findings          Physical Appearance agitated, anxious, disoriented, room air   Oral/Mouth Cavity teeth missing   Edema  no edema   Gastrointestinal normoactive, last bowel movement:12/3   Skin  skin tear, surgical incision lumbar spine inc   Tubes/Drains NG tube   NFPE Not applicable at this time   --  Current Nutrition Orders & Evaluation of Intake       Oral Nutrition     Food Allergies NKFA   Current PO Diet Diet: Liquid Diets; Clear Liquid; Texture: Regular Texture (IDDSI 7); Fluid Consistency: Thin (IDDSI 0)  Adult Central 2-in-1 TPN  Adult Central 2-in-1 TPN   Supplement n/a   PO Evaluation     % PO Intake No PO intake available    # of Days Evaluated    --  PES STATEMENT / NUTRITION DIAGNOSIS      Nutrition Dx Problem  Problem: Needs Alternative Route  Etiology: Factors Affecting Nutrition lethargic, confused, agitated at times  Signs/Symptoms: Clear Liquid Diet and Report/Observation    Comment:    --  NUTRITION INTERVENTION / PLAN OF CARE      Intervention Goal(s) Maintain nutrition status, Nutrition support treatment, Initiate TF/PN and Tolerate TF/PN at goal         RD Intervention/Action Follow Tx Progress, Care plan reviewed and Recommend/ordered: EN         Prescription/Orders:       PO Diet       Supplements       Snacks       Enteral Nutrition    Enteral Prescription:     Enteral Route NG    TF Delivery Method Continuous    Enteral Product Nutren 1.5    Modular     Propofol Rate/Kcal     TF Start Rate (mL/hr) 20    TF Goal Rate (mL/hr) 45    Free Water Flush (mL) 35 mL q hour    TF Provision at Goal: 1620 kcal, 73 gm protein, 821 mL free water + 840 mL water flushes         Calories 100 % needs met          Protein  100 % needs met         Fluid (mL) 1661 mL total     Prescription Ordered Yes         Parenteral Nutrition    New Prescription Ordered? Yes   --      Monitor/Evaluation Per protocol, Pertinent labs, EN delivery/tolerance, Weight, Skin status, GI status, Symptoms, Swallow function   Discharge Plan/Needs Pending clinical course   Education Will instruct as appropriate   --    RD to follow per protocol.      Electronically signed by:  Michell Chacon RD  12/05/22 16:04 EST

## 2022-12-06 ENCOUNTER — TELEPHONE (OUTPATIENT)
Dept: NEUROSURGERY | Facility: CLINIC | Age: 82
End: 2022-12-06

## 2022-12-06 ENCOUNTER — APPOINTMENT (OUTPATIENT)
Dept: GENERAL RADIOLOGY | Facility: HOSPITAL | Age: 82
End: 2022-12-06

## 2022-12-06 LAB
ALBUMIN SERPL-MCNC: 2.4 G/DL (ref 3.5–5.2)
ALBUMIN/GLOB SERPL: 0.9 G/DL
ALP SERPL-CCNC: 124 U/L (ref 39–117)
ALT SERPL W P-5'-P-CCNC: 103 U/L (ref 1–41)
ANION GAP SERPL CALCULATED.3IONS-SCNC: 6.3 MMOL/L (ref 5–15)
AST SERPL-CCNC: 95 U/L (ref 1–40)
BASOPHILS # BLD AUTO: 0.01 10*3/MM3 (ref 0–0.2)
BASOPHILS NFR BLD AUTO: 0.1 % (ref 0–1.5)
BILIRUB SERPL-MCNC: 0.2 MG/DL (ref 0–1.2)
BUN SERPL-MCNC: 24 MG/DL (ref 8–23)
BUN/CREAT SERPL: 30 (ref 7–25)
CALCIUM SPEC-SCNC: 8.1 MG/DL (ref 8.6–10.5)
CHLORIDE SERPL-SCNC: 113 MMOL/L (ref 98–107)
CO2 SERPL-SCNC: 23.7 MMOL/L (ref 22–29)
CREAT SERPL-MCNC: 0.8 MG/DL (ref 0.76–1.27)
DEPRECATED RDW RBC AUTO: 44 FL (ref 37–54)
EGFRCR SERPLBLD CKD-EPI 2021: 88.4 ML/MIN/1.73
EOSINOPHIL # BLD AUTO: 0.13 10*3/MM3 (ref 0–0.4)
EOSINOPHIL NFR BLD AUTO: 1.3 % (ref 0.3–6.2)
ERYTHROCYTE [DISTWIDTH] IN BLOOD BY AUTOMATED COUNT: 13.5 % (ref 12.3–15.4)
GLOBULIN UR ELPH-MCNC: 2.8 GM/DL
GLUCOSE BLDC GLUCOMTR-MCNC: 80 MG/DL (ref 70–130)
GLUCOSE SERPL-MCNC: 129 MG/DL (ref 65–99)
HCT VFR BLD AUTO: 26.4 % (ref 37.5–51)
HGB BLD-MCNC: 8.2 G/DL (ref 13–17.7)
IMM GRANULOCYTES # BLD AUTO: 0.14 10*3/MM3 (ref 0–0.05)
IMM GRANULOCYTES NFR BLD AUTO: 1.4 % (ref 0–0.5)
LYMPHOCYTES # BLD AUTO: 1.42 10*3/MM3 (ref 0.7–3.1)
LYMPHOCYTES NFR BLD AUTO: 14 % (ref 19.6–45.3)
MCH RBC QN AUTO: 28.1 PG (ref 26.6–33)
MCHC RBC AUTO-ENTMCNC: 31.1 G/DL (ref 31.5–35.7)
MCV RBC AUTO: 90.4 FL (ref 79–97)
MONOCYTES # BLD AUTO: 0.44 10*3/MM3 (ref 0.1–0.9)
MONOCYTES NFR BLD AUTO: 4.3 % (ref 5–12)
NEUTROPHILS NFR BLD AUTO: 78.9 % (ref 42.7–76)
NEUTROPHILS NFR BLD AUTO: 8 10*3/MM3 (ref 1.7–7)
NRBC BLD AUTO-RTO: 0.2 /100 WBC (ref 0–0.2)
PLATELET # BLD AUTO: 283 10*3/MM3 (ref 140–450)
PMV BLD AUTO: 10.3 FL (ref 6–12)
POTASSIUM SERPL-SCNC: 3.9 MMOL/L (ref 3.5–5.2)
PROT SERPL-MCNC: 5.2 G/DL (ref 6–8.5)
RBC # BLD AUTO: 2.92 10*6/MM3 (ref 4.14–5.8)
SODIUM SERPL-SCNC: 143 MMOL/L (ref 136–145)
WBC NRBC COR # BLD: 10.14 10*3/MM3 (ref 3.4–10.8)

## 2022-12-06 PROCEDURE — 25010000002 ENOXAPARIN PER 10 MG: Performed by: NURSE PRACTITIONER

## 2022-12-06 PROCEDURE — 97530 THERAPEUTIC ACTIVITIES: CPT

## 2022-12-06 PROCEDURE — 99232 SBSQ HOSP IP/OBS MODERATE 35: CPT | Performed by: NURSE PRACTITIONER

## 2022-12-06 PROCEDURE — 73502 X-RAY EXAM HIP UNI 2-3 VIEWS: CPT

## 2022-12-06 PROCEDURE — 92610 EVALUATE SWALLOWING FUNCTION: CPT

## 2022-12-06 PROCEDURE — 80053 COMPREHEN METABOLIC PANEL: CPT | Performed by: HOSPITALIST

## 2022-12-06 PROCEDURE — 99024 POSTOP FOLLOW-UP VISIT: CPT

## 2022-12-06 PROCEDURE — 85025 COMPLETE CBC W/AUTO DIFF WBC: CPT | Performed by: INTERNAL MEDICINE

## 2022-12-06 PROCEDURE — 99232 SBSQ HOSP IP/OBS MODERATE 35: CPT | Performed by: SURGERY

## 2022-12-06 PROCEDURE — 82962 GLUCOSE BLOOD TEST: CPT

## 2022-12-06 RX ADMIN — PANTOPRAZOLE SODIUM 40 MG: 40 INJECTION, POWDER, FOR SOLUTION INTRAVENOUS at 05:55

## 2022-12-06 RX ADMIN — ENOXAPARIN SODIUM 40 MG: 100 INJECTION SUBCUTANEOUS at 17:33

## 2022-12-06 RX ADMIN — Medication 3 ML: at 08:17

## 2022-12-06 RX ADMIN — BISACODYL 10 MG: 10 SUPPOSITORY RECTAL at 08:17

## 2022-12-06 RX ADMIN — Medication 10 ML: at 08:17

## 2022-12-06 RX ADMIN — MULTIPLE VITAMINS W/ MINERALS TAB 1 TABLET: TAB at 08:16

## 2022-12-06 RX ADMIN — Medication 10 ML: at 19:58

## 2022-12-06 RX ADMIN — OLANZAPINE 2.5 MG: 2.5 TABLET ORAL at 17:32

## 2022-12-06 RX ADMIN — TAMSULOSIN HYDROCHLORIDE 0.4 MG: 0.4 CAPSULE ORAL at 08:16

## 2022-12-06 NOTE — PROGRESS NOTES
Name: Boni Hernandez ADMIT: 2022   : 1940  PCP: Kiki Weiss APRN    MRN: [id number] LOS: 7 days   AGE/SEX: 82 y.o. male  ROOM: [facility]     Subjective   Subjective   More awake today.  Still speaking gibberish, does not really make sense.  Remains in restraints      Objective   Objective   Vital Signs  Temp:  [97.9 °F (36.6 °C)-98.5 °F (36.9 °C)] 98.1 °F (36.7 °C)  Heart Rate:  [] 113  Resp:  [18-20] 20  BP: (120-146)/(47-74) 146/74  SpO2:  [95 %-99 %] 99 %  on   ;   Device (Oxygen Therapy): room air  Body mass index is 18.68 kg/m².  Physical exam:  General: Chronically ill-appearing, no acute distress  HEENT: Normocephalic Intermedic  CV: Tachycardic, regular rhythm  Lungs: Normal work of breathing on room air, no signs of respiratory distress  Abdomen: Soft, nontender, nondistended  MSK: no lower extremity edema..  Remains in restraints  Neuro: Not oriented, but awake and attempts to engage in conversation.  Remains in restraints    Results Review     I reviewed the patient's new clinical results.  Results from last 7 days   Lab Units 22  0555 22  0643 22  0414 22  1159   WBC 10*3/mm3 10.14 8.65 7.14 6.45   HEMOGLOBIN g/dL 8.2* 7.9* 7.9* 8.0*   PLATELETS 10*3/mm3 283 250 232 241     Results from last 7 days   Lab Units 22  0555 22  0801 22  0738 22  1159   SODIUM mmol/L 143 143 149* 149*   POTASSIUM mmol/L 3.9 4.3 3.7 3.6   CHLORIDE mmol/L 113* 109* 114* 116*   CO2 mmol/L 23.7 25.8 26.8 28.0   BUN mg/dL 24* 20 16 18   CREATININE mg/dL 0.80 0.73* 0.70* 0.86   GLUCOSE mg/dL 129* 146* 135* 169*   Estimated Creatinine Clearance: 54.5 mL/min (by C-G formula based on SCr of 0.8 mg/dL).  Results from last 7 days   Lab Units 22  0555 22  0801 22  0738 22  1159   ALBUMIN g/dL 2.40* 2.40* 2.40* 2.50*   BILIRUBIN mg/dL 0.2 0.3 0.2 0.4   ALK PHOS U/L 124* 106 99 107   AST (SGOT) U/L 95* 57* 54* 71*   ALT (SGPT) U/L 103* 82* 86* 105*      Results from last 7 days   Lab Units 12/06/22  0555 12/05/22  0801 12/05/22  0643 12/04/22  0738 12/03/22  1159 12/02/22  0744 12/01/22  1602 12/01/22  1111   CALCIUM mg/dL 8.1* 8.5*  --  8.3* 8.2* 8.3*   < > 8.6   ALBUMIN g/dL 2.40* 2.40*  --  2.40* 2.50* 2.60*  --  2.80*   MAGNESIUM mg/dL  --   --  1.7  --  2.2 2.7*  --  3.0*   PHOSPHORUS mg/dL  --   --  4.8*  --  2.5 2.4*  --  2.7    < > = values in this interval not displayed.     Results from last 7 days   Lab Units 11/30/22  1415   LACTATE mmol/L 1.2   No results found for: COVID19  Glucose   Date/Time Value Ref Range Status   12/06/2022 1141 80 70 - 130 mg/dL Final     Comment:     Meter: [id number] : [id number] Jc Hawthorne PALLAVI       XR Hip With or Without Pelvis 2 - 3 View Left  PROCEDURE:  XR HIP W OR WO PELVIS 2-3 VIEW LEFT-     HISTORY: New onset left hip pain.     COMPARISON: Abdominal radiographs 12/3/2022     FINDINGS:       3 views of the pelvis and left hip were obtained.   There is partially imaged fusion hardware in the lower lumbar spine.   There is mild bilateral hip osteoarthritis. No acute fracture or  malalignment is identified. If there is persistent clinical concern for  acute fracture or inability to bear weight, further evaluation with  dedicated CT or MRI would be recommended.  There are bilateral stent grafts projecting over the pelvis, which are  incompletely assessed. There is calcific atherosclerosis. There are  air-filled loops of small bowel and colon partially imaged in the lower  abdomen/upper pelvis. Small bowel gas in the left lower quadrant has  progressed from 12/3/2022. There is also air in the rectum.     This report was finalized on 12/6/2022 12:36 PM by Dr. Katja Pruitt M.D.       Scheduled Medications  bisacodyl, 10 mg, Rectal, Daily  enoxaparin, 40 mg, Subcutaneous, Q24H  methylnaltrexone, 6 mg, Subcutaneous, Every Other Day  multivitamin with minerals, 1 tablet, Oral, Daily  OLANZapine, 2.5 mg, Oral, Daily  Before Supper  pantoprazole, 40 mg, Intravenous, Q AM  sodium chloride, 10 mL, Intravenous, Q12H  sodium chloride, 3 mL, Intravenous, Q12H  tamsulosin, 0.4 mg, Oral, Daily    Infusions   Diet  Diet: Regular/House Diet; Texture: Soft to Chew (NDD 3); Soft to Chew: Chopped Meat; Fluid Consistency: Thin (IDDSI 0)       Assessment/Plan     Active Hospital Problems    Diagnosis  POA   • **Lumbar spinal stenosis [M48.061]  Yes   • Severe malnutrition (HCC) [E43]  Yes   • Spinal stenosis of lumbar region with neurogenic claudication [M48.062]  Yes   • Postoperative urinary retention [N99.89, R33.8]  No   • Postoperative ileus (HCC) [K91.89, K56.7]  No   • Leukocytosis (leucocytosis) [D72.829]  No      Resolved Hospital Problems    Diagnosis Date Resolved POA   • Hyponatremia [E87.1] 11/30/2022 Yes       82 y.o. male admitted with Lumbar spinal stenosis.    This is an 82-year-old gentleman who presented to the hospital with a history of lumbar spine stenosis and medical comorbidities including BPH, hypertension and coronary artery disease he underwent surgical correction and is postoperative day 3.  A consulted for hypertension management.       1.  Hypertension  - Blood pressure in the acceptable range and for the moment will continue with p.r.n. antihypertensives.  -Continue to hold Coreg, Imdur, lisinopril, NPO with no oral meds at this time     2.   Hypernatremia, sodium iimproving and Nephrology is following along.     3.  Ileus , NG tube remains in place and general surgery is following along.  .     4.  Urinary retention , Dale catheter has been discontinued and is able to void according to nursing staff.  Urology did follow earlier during this hospital stay and is on Flomax.     5.  MESFIN , resolved felt to be multifactorial in etiology.   Creatinine did rise to 1.83 and now it is at 0.70. nephrology following along as well.     6. Suspected aspiration pneumonia, x-ray suggestive of left lower lobe infiltrate, MRSA  screen negative    Completed 6 day course of zosyn.       7.  Lumbar spine stenosis  -Defer postoperative care to primary team     8.  Metabolic encephalopathy with agitation  -Remains in restraints, started on as needed Haldol and neurology consult has been obtained today.    Blood pressures well controlled without any antihypertensive medications.  Other problems to be managed by specialist.  I will sign off, please reconsult with any further concerns.  .      Santo Hines MD  [facility]  12/06/22  13:01 EST   I wore protective equipment throughout this patient encounter including a face mask, gloves and protective eyewear.  Hand hygiene was performed before donning protective equipment and after removal when leaving the room.

## 2022-12-06 NOTE — TELEPHONE ENCOUNTER
Patient's wife called and would like you to call her regarding his hospitalization for today. She states she lives too far to be here but would like to be kept informed.

## 2022-12-06 NOTE — PROGRESS NOTES
[facility] Acute Inpt Rehab     Consult request received.  Chart reviewed.  Patient has required restraints due to encephalopathy.  Per notes, patient alert and oriented today and significantly improved (Neuro started on Zyprexa).  Will need updated physical and occupational therapy notes to best determine most appropriate level of rehab.      Dr. Pruitt also consulted.    Will follow up tomorrow to review therapy notes and discuss with spouse about acute rehab, anticipated level of assist needed at discharge and if that level of support is possible.    Thanks,   Rosalee Rooney RN  Rehab Admission Nurse

## 2022-12-06 NOTE — PROGRESS NOTES
[facility] NEUROSURGERY PROGRESS NOTE      CC:POD 13 bilateral laminectomy, neurolysis of L4 nerve root with fusion and instrumentation      Subjective     Interval History: Reports doing well. Post op pain is well controlled. C/o left hip pain over the last few days. Having BM/urinating w/o difficulty.       Objective     Vital signs in last 24 hours:  Temp:  [97.9 °F (36.6 °C)-98.5 °F (36.9 °C)] 98.3 °F (36.8 °C)  Heart Rate:  [] 105  Resp:  [18-20] 20  BP: (120-137)/(47-63) 134/54    Intake/Output this shift:  No intake/output data recorded.    LABS:  Results from last 7 days   Lab Units 12/06/22  0555 12/05/22  0643 12/04/22  0414 12/01/22  1111 11/30/22  0717   WBC 10*3/mm3 10.14 8.65 7.14   < > 14.19*   HEMOGLOBIN g/dL 8.2* 7.9* 7.9*   < > 8.9*   HEMATOCRIT % 26.4* 29.4* 26.5*   < > 27.8*   PLATELETS 10*3/mm3 283 250 232   < > 271   MONOCYTES % %  --  1.0*  --   --  8.1    < > = values in this interval not displayed.     Results from last 7 days   Lab Units 12/06/22  0555 12/05/22  0801 12/04/22  0738   SODIUM mmol/L 143 143 149*   POTASSIUM mmol/L 3.9 4.3 3.7   CHLORIDE mmol/L 113* 109* 114*   CO2 mmol/L 23.7 25.8 26.8   BUN mg/dL 24* 20 16   CREATININE mg/dL 0.80 0.73* 0.70*   CALCIUM mg/dL 8.1* 8.5* 8.3*   BILIRUBIN mg/dL 0.2 0.3 0.2   ALK PHOS U/L 124* 106 99   ALT (SGPT) U/L 103* 82* 86*   AST (SGOT) U/L 95* 57* 54*   GLUCOSE mg/dL 129* 146* 135*       IMAGING STUDIES:  No radiology results for the last day      I personally viewed and interpreted the patient's chart.    Meds reviewed/changed: Yes      Physical Exam:    General:   Awake, alert, oriented x3. Speech clear with no aphasia  Back:    Dressing overlying incision C/D/I. Incision edges well approximated without erythema or drainage.     Motor:    4/5 bilateral DF/PF and 4/5 right 3/5 left iliopsoas strength. No fasciculations, rigidity, spasticity, or abnormal movements.  Reflexes:   No clonus  Sensation:   Normal to light touch bilateral  "LE  Station and Gait:             Deferred to PT due to recent surgery      Assessment & Plan     ASSESSMENT:      Lumbar spinal stenosis    Postoperative urinary retention    Postoperative ileus (HCC)    Leukocytosis (leucocytosis)    Spinal stenosis of lumbar region with neurogenic claudication    Severe malnutrition (HCC)      PLAN:   Left hip xray  BAR consult pending   CBC in am  Continue mobilization attempts  Appreciate input from consultants      I discussed the patient's findings and my recommendations with patient, nursing staff and Dr. Alex.       LOS: 7 days       Christina Anderson PA-C  12/6/2022  11:04 EST    \"Dictated utilizing Dragon dictation\".      "

## 2022-12-06 NOTE — THERAPY TREATMENT NOTE
Patient Name: Boni Hernandez  : 1940    MRN: [id number]                              Today's Date: 2022       Admit Date: 2022    Visit Dx:     ICD-10-CM ICD-9-CM   1. Spinal stenosis of lumbar region with neurogenic claudication  M48.062 724.03     Patient Active Problem List   Diagnosis   • Lumbar spinal stenosis   • DDD (degenerative disc disease), lumbar   • Spondylolisthesis of lumbar region   • Hyperlipidemia   • HTN (hypertension)   • Coronary artery disease   • BPH (benign prostatic hyperplasia)   • GERD (gastroesophageal reflux disease)   • Constipation   • Postoperative urinary retention   • Postoperative ileus (HCC)   • Leukocytosis (leucocytosis)   • Spinal stenosis of lumbar region with neurogenic claudication   • Severe malnutrition (HCC)     Past Medical History:   Diagnosis Date   • BPH (benign prostatic hyperplasia)    • Cataract    • Coronary artery disease    • DDD (degenerative disc disease), lumbar    • GERD (gastroesophageal reflux disease)    • History of MI (myocardial infarction)     APPROX. 2 YEARS AGO, HAS STENT, FOLLOWED BY DR LOREDO, 10/21/19 STRESS TEST AT [facility]   • Pala (hard of hearing)    • HTN (hypertension)    • Hyperlipidemia    • Injury of back    • Low back pain     RADIATES DOWN BOTH LEGS   • Spinal stenosis      Past Surgical History:   Procedure Laterality Date   • CAROTID ENDARTERECTOMY Left 2017   • CATARACT EXTRACTION WITH INTRAOCULAR LENS IMPLANT Bilateral    • COLONOSCOPY     • CORONARY ANGIOPLASTY WITH STENT PLACEMENT  2016   • DENTAL PROCEDURE      DENTAL IMPLANTS   • EPIDURAL BLOCK     • LUMBAR DISCECTOMY FUSION INSTRUMENTATION N/A 2019    Procedure: Lumbar 4 to lumbar 5 laminectomy with a posterior lateral fusion and instrumentation;  Surgeon: Matthew Alex MD;  Location: [facility];  Service: Neurosurgery   • LUMBAR FUSION N/A 2022    Procedure: Lumbar 3 to lumbar 4 laminectomy with fusion and instrumentation and removal of  previous instrumentation at lumbar 4 to lumbar 5;  Surgeon: Matthew Alex MD;  [facility];  Service: Rob[employer] - Neuro;  Laterality: N/A;   • OTHER SURGICAL HISTORY  2015    PT STATES HE HAS HAD STENTS PUT IN BOTH LEGS.    • TONSILLECTOMY        General Information     [facility] Name 12/06/22 1653          Physical Therapy Time and Intention    Document Type therapy note (daily note)  -     Mode of Treatment individual therapy;physical therapy  -     Row Name 12/06/22 1653          General Information    Patient Profile Reviewed yes  -     Existing Precautions/Restrictions fall;spinal;NPO  -     Row Name 12/06/22 1653          Cognition    Orientation Status (Cognition) oriented to;person;place  -     Row Name 12/06/22 1653          Safety Issues, Functional Mobility    Impairments Affecting Function (Mobility) balance;cognition;strength;pain;postural/trunk control;endurance/activity tolerance  -           User Key  (r) = Recorded By, (t) = Taken By, (c) = Cosigned By    Initials Name Provider Type     Dejah Salazar, PT Physical Therapist               Mobility     Row Name 12/06/22 1654          Bed Mobility    Bed Mobility supine-sit;sit-supine  -     Supine-Sit Chambers (Bed Mobility) moderate assist (50% patient effort);verbal cues  -     Sit-Supine Chambers (Bed Mobility) moderate assist (50% patient effort);2 person assist  -     Assistive Device (Bed Mobility) head of bed elevated  -     Row Name 12/06/22 1654          Sit-Stand Transfer    Sit-Stand Chambers (Transfers) 2 person assist;verbal cues;maximum assist (25% patient effort)  -     Assistive Device (Sit-Stand Transfers) other (see comments)  HHA x2  -     Comment, (Sit-Stand Transfer) Minimally cleared bottom from bed, declined further standing 2/2 10/10 pain and requesting return to bed  -           User Key  (r) = Recorded By, (t) = Taken By, (c) = Cosigned By    Initials Name Provider Type      Dejah Salazar PT Physical Therapist               Obj/Interventions     Row Name 12/06/22 1657          Motor Skills    Therapeutic Exercise --  10 reps B AP/LAQ/seated marches  -           User Key  (r) = Recorded By, (t) = Taken By, (c) = Cosigned By    Initials Name Provider Type     Dejah Salazar PT Physical Therapist               Goals/Plan    No documentation.                Clinical Impression     Row Name 12/06/22 1657          Pain    Pretreatment Pain Rating 8/10  [facility]     Posttreatment Pain Rating 10/10  -     Pain Location - back  -     Pain Intervention(s) Repositioned;Ambulation/increased activity;Rest  -     Row Name 12/06/22 1657          Plan of Care Review    Plan of Care Reviewed With patient  -     Progress no change  -     Outcome Evaluation Pt seen for PT this PM, continues to be limited 2/2 pain. Pt transferred to EOB with mod A x1 and tolerated LE exercises. Pt stood with max A x2 and minimal clearance from bed. Significant forward flexed posture and unable to correct even with max cueing and assist. Pt assisted back to supine and pt adamantly declines further standing attempts, impulsively returning to supine. Max cueing for spinal precautions and log rolling, pt requiring a lot of assist for repositioning in bed and left with all needs met. PT will continue to follow to progress mobility as tolerated. Anticipate DC to SNF.  -     Row Name 12/06/22 1657          Vital Signs    O2 Delivery Pre Treatment room air  -     O2 Delivery Intra Treatment room air  -     O2 Delivery Post Treatment room air  -     Row Name 12/06/22 1657          Positioning and Restraints    Pre-Treatment Position in bed  -     Post Treatment Position bed  -     In Bed fowlers;call light within reach;encouraged to call for assist;exit alarm on  -           User Key  (r) = Recorded By, (t) = Taken By, (c) = Cosigned By    Initials Name Provider Type     Dejah Salazar PT Physical  Therapist               Outcome Measures     Row Name 12/06/22 1703          How much help from another person do you currently need...    Turning from your back to your side while in flat bed without using bedrails? 2  -BH     Moving from lying on back to sitting on the side of a flat bed without bedrails? 3  -BH     Moving to and from a bed to a chair (including a wheelchair)? 2  -BH     Standing up from a chair using your arms (e.g., wheelchair, bedside chair)? 2  -BH     Climbing 3-5 steps with a railing? 1  -BH     To walk in hospital room? 1  -     AM-PAC 6 Clicks Score (PT) 11  -     Highest level of mobility 4 --> Transferred to chair/commode  -     Row Name 12/06/22 1703          Functional Assessment    Outcome Measure Options AM-PAC 6 Clicks Basic Mobility (PT)  -           User Key  (r) = Recorded By, (t) = Taken By, (c) = Cosigned By    Initials Name Provider Type     Dejah Salazar PT Physical Therapist                             Physical Therapy Education     Title: PT OT SLP Therapies (Done)     Topic: Physical Therapy (Done)     Point: Mobility training (Done)     Learning Progress Summary           Patient Acceptance, E,TB,D, VU,NR by  at 12/6/2022 1704    Acceptance, E, NR by EM at 12/2/2022 1346    Acceptance, E, NR by EM at 11/30/2022 1629    Acceptance, E,D, NR,NL by PH at 11/29/2022 1548    Acceptance, E,TB, VU,DU by LB at 11/27/2022 1400    Acceptance, E,TB,D, VU,DU by LB at 11/26/2022 1245    Acceptance, E,D, VU,NR by MS at 11/25/2022 1156    Acceptance, E,TB, VU,DU by CALI at 11/24/2022 1135                   Point: Home exercise program (Done)     Learning Progress Summary           Patient Acceptance, E,TB,D, VU,NR by  at 12/6/2022 1704    Acceptance, E,D, NR,NL by PH at 11/29/2022 1548    Acceptance, E,TB, VU,DU by LB at 11/27/2022 1400    Acceptance, E,TB,D, VU,DU by LB at 11/26/2022 1245    Acceptance, E,FIORDALIZA, CHELY,NR by MS at 11/25/2022 1156    Acceptance, EDEON VUDU by CALI  at 11/24/2022 1135                   Point: Body mechanics (Done)     Learning Progress Summary           Patient Acceptance, E,TB,D, VU,NR by  at 12/6/2022 1704    Acceptance, E,D, NR,NL by PH at 11/29/2022 1548    Acceptance, E,TB, VU,DU by LB at 11/27/2022 1400    Acceptance, E,TB,D, VU,DU by LB at 11/26/2022 1245    Acceptance, E,D, VU,NR by MS at 11/25/2022 1156    Acceptance, E,TB, VU,DU by CALI at 11/24/2022 1135                   Point: Precautions (Done)     Learning Progress Summary           Patient Acceptance, E,TB,D, VU,NR by  at 12/6/2022 1704    Acceptance, E,D, NR,NL by PH at 11/29/2022 1548    Acceptance, E,TB, VU,DU by LB at 11/27/2022 1400    Acceptance, E,TB,D, VU,DU by LB at 11/26/2022 1245    Acceptance, E,D, VU,NR by MS at 11/25/2022 1156    Acceptance, E,TB, VU,DU by CALI at 11/24/2022 1135                               User Key     Initials Effective Dates Name Provider Type Discipline    EM 06/16/21 -  Virgie Lynch, PT Physical Therapist PT    MS 06/16/21 -  Martinez Solares, PT Physical Therapist PT     05/19/21 -  Merlene Haines, PT Physical Therapist PT     08/09/20 -  Leticia Cadet, PT Physical Therapist PT     06/16/21 -  Miri Khan PTA Physical Therapist Assistant PT     04/08/22 -  Dejah Salazar, PT Physical Therapist PT              PT Recommendation and Plan     Plan of Care Reviewed With: patient  Progress: no change  Outcome Evaluation: Pt seen for PT this PM, continues to be limited 2/2 pain. Pt transferred to EOB with mod A x1 and tolerated LE exercises. Pt stood with max A x2 and minimal clearance from bed. Significant forward flexed posture and unable to correct even with max cueing and assist. Pt assisted back to supine and pt adamantly declines further standing attempts, impulsively returning to supine. Max cueing for spinal precautions and log rolling, pt requiring a lot of assist for repositioning in bed and left with all needs  met. PT will continue to follow to progress mobility as tolerated. Anticipate DC to SNF.     Time Calculation:    PT Charges     Row Name 12/06/22 1704             Time Calculation    Start Time 1450  -      Stop Time 1505  -      Time Calculation (min) 15 min  -      PT Received On 12/06/22  -      PT - Next Appointment 12/07/22  -         Time Calculation- PT    Total Timed Code Minutes- PT 15 minute(s)  -         Timed Charges    64805 - PT Therapeutic Activity Minutes 15  -BH         Total Minutes    Timed Charges Total Minutes 15  -BH       Total Minutes 15  -BH            User Key  (r) = Recorded By, (t) = Taken By, (c) = Cosigned By    Initials Name Provider Type     Dejah Salazar, PT Physical Therapist              Therapy Charges for Today     Code Description Service Date Service Provider Modifiers Qty    26050720720 HC PT THERAPEUTIC ACT EA 15 MIN 12/6/2022 Dejah Salazar, PT GP 1    85044015686 HC PT THER SUPP EA 15 MIN 12/6/2022 Dejah Salazar, PT GP 1          PT G-Codes  Outcome Measure Options: AM-PAC 6 Clicks Basic Mobility (PT)  AM-PAC 6 Clicks Score (PT): 11  AM-PAC 6 Clicks Score (OT): 11  PT Discharge Summary  Anticipated Discharge Disposition (PT): skilled nursing facility    Dejah Salazar PT  12/6/2022

## 2022-12-06 NOTE — PLAN OF CARE
Goal Outcome Evaluation:         Reassessment for PO, patient able to follow commands, loose upper/dentures when placed.  Pt appears appropriate for mech soft/thin, may be appropriate for regular when denture paste available.  May need assist to feed, upright for all PO.  ST to follow for possible diet upgrade.

## 2022-12-06 NOTE — PROGRESS NOTES
CC: Ileus    S: Patient is a little more awake today, tolerating clear liquid diet.  Tolerating tube feeds at goal.  No nausea or vomiting.  Having bowel function.  No abdominal pain    O:   Vitals:    12/06/22 0631 12/06/22 0700 12/06/22 1100 12/06/22 1300   BP:  134/54 146/74 (!) 81/48   BP Location:  Right arm Right arm Right arm   Patient Position:  Lying Lying Sitting   Pulse:  105 113 120   Resp:       Temp:  98.3 °F (36.8 °C) 98.1 °F (36.7 °C) 98.6 °F (37 °C)   TempSrc:  Oral Oral Oral   SpO2:  99% 99% 97%   Weight: 54.1 kg (119 lb 4.3 oz)      Height:         Alert, no acute distress  Breathing comfortable  Regular rate rhythm  Core track in place with tube feeds running  Abdomen soft, mildly tender to palpation, non-distended    While cell count 10.1, hemoglobin 8.2, platelets 283  Albumin 2.4, , AST 95, alk phos 124  All other labs reviewed    Assessment and plan    82-year-old male with ileus after spine surgery.  Tolerating tube feeds.  LFTs elevation likely related to TPN, TPN has been discontinued.  He is tolerating clear liquid diet.  Discussed with patient and nursing about further step.  I think he should have diet as tolerated.  Continue tube feeds for now.  We will stop tube feeds whenever he is tolerating regular diet.  We will continue to follow

## 2022-12-06 NOTE — PROGRESS NOTES
Boni Hernandez   82 y.o.  male    LOS: 7 days   Patient Care Team:  Kiki Weiss APRN as PCP - General (Nurse Practitioner)  Mark Abraham MD as Consulting Physician (Cardiology)      Subjective     Interval History:     Patient Complaints:     Review of Systems:       Medication Review:   Current Facility-Administered Medications:   •  acetaminophen (TYLENOL) tablet 1,000 mg, 1,000 mg, Oral, Q6H PRN, Matthew Alex MD, 1,000 mg at 12/05/22 2159  •  bisacodyl (DULCOLAX) suppository 10 mg, 10 mg, Rectal, Daily, Corbin Mc MD, 10 mg at 12/06/22 0817  •  Enoxaparin Sodium (LOVENOX) syringe 40 mg, 40 mg, Subcutaneous, Q24H, Sakina Goldstein APRN, 40 mg at 12/05/22 1720  •  methylnaltrexone (RELISTOR) injection 6 mg, 6 mg, Subcutaneous, Every Other Day, Corbin Mc MD, 6 mg at 12/05/22 0830  •  multivitamin with minerals 1 tablet, 1 tablet, Oral, Daily, Matthew Alex MD, 1 tablet at 12/06/22 0816  •  nitroglycerin (NITROSTAT) SL tablet 0.4 mg, 0.4 mg, Sublingual, Q5 Min PRN, Matthew Alex MD  •  OLANZapine (zyPREXA) injection 5 mg, 5 mg, Intramuscular, Q4H PRN, Patrick Wilkinson MD, 5 mg at 12/04/22 1850  •  OLANZapine (zyPREXA) tablet 2.5 mg, 2.5 mg, Oral, Daily Before Supper, Dania Bennett APRN, 2.5 mg at 12/05/22 1714  •  ondansetron (ZOFRAN) tablet 4 mg, 4 mg, Oral, Q6H PRN **OR** ondansetron (ZOFRAN) injection 4 mg, 4 mg, Intravenous, Q6H PRN, Matthew Alex MD, 4 mg at 11/27/22 1104  •  pantoprazole (PROTONIX) injection 40 mg, 40 mg, Intravenous, Q AM, Mor Yost MD, 40 mg at 12/06/22 0555  •  Pharmacy to Dose TPN, , Does not apply, Continuous PRN, Mor Yost MD  •  sodium chloride 0.9 % flush 10 mL, 10 mL, Intravenous, PRN, Matthew Alex MD  •  sodium chloride 0.9 % flush 10 mL, 10 mL, Intravenous, Q12H, Mor Yost MD, 10 mL at 12/06/22 0817  •  sodium chloride 0.9 % flush 10 mL, 10 mL, Intravenous, PRN, Mor Yost MD  •  sodium  chloride 0.9 % flush 20 mL, 20 mL, Intravenous, PRN, Mor Yost MD  •  sodium chloride 0.9 % flush 3 mL, 3 mL, Intravenous, Q12H, Matthew lAex MD, 3 mL at 12/06/22 0817  •  sodium chloride 0.9 % infusion 40 mL, 40 mL, Intravenous, PRN, Matthew Alex MD  •  tamsulosin (FLOMAX) 24 hr capsule 0.4 mg, 0.4 mg, Oral, Daily, Patrick Henry Jr., MD, 0.4 mg at 12/06/22 0816      Objective   Vital Sign Min/Max for last 24 hours  Temp  Min: 97.6 °F (36.4 °C)  Max: 98.5 °F (36.9 °C)   BP  Min: 104/40  Max: 137/63    Pulse  Min: 97  Max: 105     Wt Readings from Last 3 Encounters:   12/06/22 54.1 kg (119 lb 4.3 oz)   11/10/22 68.9 kg (152 lb)   10/27/22 70.3 kg (155 lb)      No intake or output data in the 24 hours ending 12/06/22 0923  Physical Exam:      General Appearance:    Frail elderly male in NAD   Head:    Normocephalic, atraumatic   Eyes:            Conjunctivae normal, anicteric, no xanthelasma   Neck:   supple, trachea midline, no thyromegaly, no carotid bruit, no JVD, no elevated CVP   Lungs:     Clear to auscultation,respirations regular, even and                  unlabored    Heart:    Regular rhythm and normal rate, normal S1 and S2,            No murmur, no gallop, no rub, no click   Chest Wall:    No abnormalities observed   Abdomen:     Normal bowel sounds, dht with tube feedings   Rectal:     Deferred   Extremities:   No edema. Moves all extremities well, no cyanosis, no erythema   Pulses:   Pulses palpable and equal bilaterally   Skin:   No bleeding, bruising or rash   Neurologic:   awake alert and oriented x3, speech clear and approp, no facial drooping     : voids   Monitor:  Sinus tach    Results Review:         Sodium Sodium   Date Value Ref Range Status   12/06/2022 143 136 - 145 mmol/L Final   12/05/2022 143 136 - 145 mmol/L Final   12/04/2022 149 (H) 136 - 145 mmol/L Final   12/03/2022 149 (H) 136 - 145 mmol/L Final      Potassium Potassium   Date Value Ref Range Status    12/06/2022 3.9 3.5 - 5.2 mmol/L Final   12/05/2022 4.3 3.5 - 5.2 mmol/L Final   12/04/2022 3.7 3.5 - 5.2 mmol/L Final   12/03/2022 3.6 3.5 - 5.2 mmol/L Final      Chloride Chloride   Date Value Ref Range Status   12/06/2022 113 (H) 98 - 107 mmol/L Final   12/05/2022 109 (H) 98 - 107 mmol/L Final   12/04/2022 114 (H) 98 - 107 mmol/L Final   12/03/2022 116 (H) 98 - 107 mmol/L Final      Bicarbonate No results found for: PLASMABICARB   BUN BUN   Date Value Ref Range Status   12/06/2022 24 (H) 8 - 23 mg/dL Final   12/05/2022 20 8 - 23 mg/dL Final   12/04/2022 16 8 - 23 mg/dL Final   12/03/2022 18 8 - 23 mg/dL Final      Creatinine Creatinine   Date Value Ref Range Status   12/06/2022 0.80 0.76 - 1.27 mg/dL Final   12/05/2022 0.73 (L) 0.76 - 1.27 mg/dL Final   12/04/2022 0.70 (L) 0.76 - 1.27 mg/dL Final   12/03/2022 0.86 0.76 - 1.27 mg/dL Final      Calcium Calcium   Date Value Ref Range Status   12/06/2022 8.1 (L) 8.6 - 10.5 mg/dL Final   12/05/2022 8.5 (L) 8.6 - 10.5 mg/dL Final   12/04/2022 8.3 (L) 8.6 - 10.5 mg/dL Final   12/03/2022 8.2 (L) 8.6 - 10.5 mg/dL Final      Magnesium Magnesium   Date Value Ref Range Status   12/05/2022 1.7 1.6 - 2.4 mg/dL Final   12/03/2022 2.2 1.6 - 2.4 mg/dL Final        Results from last 7 days   Lab Units 12/06/22  0555   WBC 10*3/mm3 10.14   HEMOGLOBIN g/dL 8.2*   HEMATOCRIT % 26.4*   PLATELETS 10*3/mm3 283     No results found for: TROPONINT         Echo EF Estimated  No results found for: ECHOEFEST      Assessment/ Plan  Assessment & Plan   Active Hospital Problems    Diagnosis  POA   • **Lumbar spinal stenosis [M48.061]  Yes     Priority: Low   • Severe malnutrition (HCC) [E43]  Yes     Priority: Low   • Spinal stenosis of lumbar region with neurogenic claudication [M48.062]  Yes     Priority: Low   • Postoperative urinary retention [N99.89, R33.8]  No     Priority: Low   • Postoperative ileus (HCC) [K91.89, K56.7]  No     Priority: Low   • Leukocytosis (leucocytosis) [D72.829]   No     Priority: Low       1. Urinary retention post-op back surgery 11/23 for spinal stenosis  Urology foll  -h/o bph  fc removed     2.  postoperative ileus- now resolved  DHT with tube feeding  gen surgery foll     3. Hypertension with soft bp  - holding Coreg, Imdur, lisinopril     4. Hypernatremia/MESFIN  Renal foll- improving      5.  Metabolic encephalopathy  Neuro foll     6. Possible aspiration pneumonia vs pneumonitis  -XR showed LLL consolidation  -s/p  zosyn --primary foll  -MRSA screen negative     7. hyperlipidemia  Alt 103, ast 95- statin on hold     8. CAD    A.  stenting of the circumflex in 2016     B. Stenting of the LAD in March 2021     9.  Sinus tach     Plan  He is off DAPT however his stent was more than a year ago.       Janett Chavez, APRN  12/06/22  09:23 EST    Patient seen and examined heart S1-S2 normal lungs clear      To continue same

## 2022-12-06 NOTE — PLAN OF CARE
Goal Outcome Evaluation:               Patient resting in bed, out of restraints and following commands properly. He is calm and cooperative, eating meals with setup assistance only, tube feeds are still running per Dr. Mc until patient can tolerate meal intake PO.     Vital signs are stable and denies pain to RN. He expresses no further needs at this time, RN will continue to round per hospital protocol.

## 2022-12-06 NOTE — PROGRESS NOTES
[facility] Progress Note      Patient Name: Boni Hernandez  : 1940  MRN: [id number]  Primary Care Physician:  Kiki Weiss APRN  Date of admission: 2022    Subjective     Interval History:   Follow up hyponatremia  Patient resting comfortably.  Laying down in bed.  Started core track with tube feeding.  Patient currently restrained.    Review of Systems:   Not obtainable    Objective     Vitals:   Temp:  [97.6 °F (36.4 °C)-98.5 °F (36.9 °C)] 97.9 °F (36.6 °C)  Heart Rate:  [97-98] 97  Resp:  [18-20] 20  BP: (104-137)/(40-63) 129/47    Intake/Output Summary (Last 24 hours) at 2022 0724  Last data filed at 2022 0800  Gross per 24 hour   Intake --   Output 200 ml   Net -200 ml       Physical Exam:    Constitutional: confused, chronically ill  HEENT: Sclera anicteric, oral mucosa dry, edentulous.   Neck:  no JVD  Heart : RRR, no s3  Lungs: a few crackles; no wheezing; not labored on room air  Abd: BS absent, soft, grimaces with palpation, + distended  : No palpable bladder  Ext: No edema.  Neurologic: moving all extremities  Skin: Warm and dry      Scheduled Meds:     bisacodyl, 10 mg, Rectal, Daily  enoxaparin, 40 mg, Subcutaneous, Q24H  methylnaltrexone, 6 mg, Subcutaneous, Every Other Day  multivitamin with minerals, 1 tablet, Oral, Daily  OLANZapine, 2.5 mg, Oral, Daily Before Supper  pantoprazole, 40 mg, Intravenous, Q AM  sodium chloride, 10 mL, Intravenous, Q12H  sodium chloride, 3 mL, Intravenous, Q12H  tamsulosin, 0.4 mg, Oral, Daily      IV Meds:   Pharmacy to Dose TPN,         Results Reviewed:   I have personally reviewed the results from the time of this admission to 2022 07:24 EST     Results from last 7 days   Lab Units 22  0555 22  0801 22  0738   SODIUM mmol/L 143 143 149*   POTASSIUM mmol/L 3.9 4.3 3.7   CHLORIDE mmol/L 113* 109* 114*   CO2 mmol/L 23.7 25.8 26.8   BUN mg/dL 24* 20 16   CREATININE mg/dL 0.80 0.73* 0.70*    CALCIUM mg/dL 8.1* 8.5* 8.3*   BILIRUBIN mg/dL 0.2 0.3 0.2   ALK PHOS U/L 124* 106 99   ALT (SGPT) U/L 103* 82* 86*   AST (SGOT) U/L 95* 57* 54*   GLUCOSE mg/dL 129* 146* 135*       Estimated Creatinine Clearance: 54.5 mL/min (by C-G formula based on SCr of 0.8 mg/dL).    Results from last 7 days   Lab Units 12/05/22  0643 12/03/22  1159 12/02/22  0744   MAGNESIUM mg/dL 1.7 2.2 2.7*   PHOSPHORUS mg/dL 4.8* 2.5 2.4*       Results from last 7 days   Lab Units 11/30/22  0717   URIC ACID mg/dL 5.9       Results from last 7 days   Lab Units 12/05/22  0643 12/04/22  0414 12/03/22  1159 12/02/22  0744 12/01/22  1111   WBC 10*3/mm3 8.65 7.14 6.45 9.26 13.69*   HEMOGLOBIN g/dL 7.9* 7.9* 8.0* 9.0* 9.1*   PLATELETS 10*3/mm3 250 232 241 285 297             Assessment / Plan     ASSESSMENT:  1.  Hypernatremia.  Intravascular volume depletion. Improving slowly.    2.  MESFIN, nonoliguric, resolved:  3.  Lumbar spinal stenosis s/p laminectomy and facetectomy on 11/23  4.  Postoperative ileus: NG tube in the place.  Tube feed  5.  Hypertension, controlled.  Continue to hold Imdur lisinopril  6.  Urinary retention, with previous Dale catheter  7.  Anemia, stable  8.  Urinary retention Dale catheter has been discontinued     PLAN:    1.  Continue current management.  Awaiting approval in long-term facility  2.  Surveillance lab    Aileen Hogan MD  12/06/22  07:24 [facility]    [facility]  [phone]

## 2022-12-06 NOTE — PLAN OF CARE
Goal Outcome Evaluation:  Plan of Care Reviewed With: patient        Progress: no change  Outcome Evaluation: Pt seen for PT this PM, continues to be limited 2/2 pain. Pt transferred to EOB with mod A x1 and tolerated LE exercises. Pt stood with max A x2 and minimal clearance from bed. Significant forward flexed posture and unable to correct even with max cueing and assist. Pt assisted back to supine and pt adamantly declines further standing attempts, impulsively returning to supine. Max cueing for spinal precautions and log rolling, pt requiring a lot of assist for repositioning in bed and left with all needs met. PT will continue to follow to progress mobility as tolerated. Anticipate DC to SNF.

## 2022-12-06 NOTE — PROGRESS NOTES
"DOS: 2022  NAME: Boni Hernandez   : 1940  PCP: Kiki Weiss APRN  Reason for consult: AMS    Neurology    Subjective: Patient appears significantly improved today.  He is alert and oriented.  He ate some Jell-O this morning and was able to feed himself.  He is complaining of some left hip pain.    Objective:  Vital signs: /74 (BP Location: Right arm, Patient Position: Lying)   Pulse 113   Temp 98.1 °F (36.7 °C) (Oral)   Resp 20   Ht 170.2 cm (67\")   Wt 54.1 kg (119 lb 4.3 oz)   SpO2 99%   BMI 18.68 kg/m²       General appearance: Well developed, well nourished, alert and cooperative.   HEENT: Normocephalic.   Neck: Supple   Cardiac: Regular rate and rhythm.  Peripheral Vasculature: Radial l pulses are equal and symmetric.  Chest Exam: Clear to auscultation bilaterally, no wheezes, no rhonchi.  Extremities: Normal, no edema.   Skin: No rashes or birthmarks.     Higher integrative function: Awake/alert, oriented to self, location, month and year.  Oriented to situation.  Speech fluent.  Cranial nerves: Visual fields intact, extraocular movements full without nystagmus.  Pupils are equal, round, reactive to light.  Normal facial sensation.  No facial asymmetry.  Decreased hearing.  Tongue midline.  No dysarthria.  Motor: Normal muscle strength, bulk, and tone in upper and lower extremities except left lower extremity noted to be antalgic with hip flexion. No fasciculations, rigidity, spasticity or abnormal movements.   Sensation: Intact to light touch in arms and legs.  Station and gait: Deferred.  Coordination: Finger to nose test showed no dysmetria however initially he did have difficulty with name with right finger-to-nose but this improved with repetition.      Laboratory results:  Lab Results   Component Value Date    GLUCOSE 129 (H) 2022    CALCIUM 8.1 (L) 2022     2022    K 3.9 2022    CO2 23.7 2022     (H) 2022    BUN 24 (H) 2022    " CREATININE 0.80 12/06/2022    EGFRIFNONA 100 11/15/2019    BCR 30.0 (H) 12/06/2022    ANIONGAP 6.3 12/06/2022     Lab Results   Component Value Date    WBC 10.14 12/06/2022    HGB 8.2 (L) 12/06/2022    HCT 26.4 (L) 12/06/2022    MCV 90.4 12/06/2022     12/06/2022     No results found for: CHOL  No results found for: HDL  No results found for: LDL  Lab Results   Component Value Date    TRIG 101 12/03/2022          Review and interpretation of imaging: CT head images viewed by me, no acute findings seen.  XR Abdomen Flat & Upright    Result Date: 12/3/2022  XR ABDOMEN FLAT AND UPRIGHT-  INDICATIONS: Ileus, follow-up  TECHNIQUE: Supine and upright views of the abdomen  COMPARISON:  image from CT from 11/30/2022  FINDINGS:  NG tube extends to the proximal to mid stomach. The cecum is gas distended, but mildly less than on the prior exam. The rest of the colon is less gas distended. Loops of gas filled small bowel and the left aspect of the abdomen are noted, with borderline prominent caliber. No free air is seen under the diaphragm. Follow-up as indications persist.       As described.  This report was finalized on 12/3/2022 9:22 AM by Dr. Wes De Guzman M.D.      CT Head Without Contrast    Result Date: 12/5/2022  CT SCAN OF THE BRAIN WITHOUT CONTRAST  HISTORY: Confusion. Decreased level of consciousness.  FINDINGS: The CT scan was performed through the brain without contrast. There is prominent diffuse atrophy and chronic small vessel ischemic change. There is no evidence of acute intracranial hemorrhage or mass effect. The visualized sinuses and mastoid air cells are clear.      Radiation dose reduction techniques were utilized, including automated exposure control and exposure modulation based on body size.  This report was finalized on 12/5/2022 3:04 PM by Dr. Brian Bird M.D.      XR Abdomen KUB    Result Date: 12/2/2022  XR ABDOMEN KUB-  INDICATIONS: NG tube advanced  TECHNIQUE: Frontal view of  the abdomen  COMPARISON: 12/02/2022 at 1745 hours  FINDINGS:  NG tube extends to the proximal stomach. The bowel gas pattern is nonspecific. Follow-up as clinically indicated.       As described.  This report was finalized on 12/2/2022 7:42 PM by Dr. Wes De Guzman M.D.      XR Abdomen KUB    Result Date: 12/2/2022  XR ABDOMEN KUB-  INDICATIONS: NG tube placement  TECHNIQUE: Frontal view of the abdomen  COMPARISON: 11/30/2022 CT  image  FINDINGS:  The NG tube extends to the proximal stomach, with the proximal port at the expected location of the gastroesophageal junction, suggest advancing the tube. The bowel gas pattern is nonspecific. Follow-up as clinically indicated.         As described.  This report was finalized on 12/2/2022 6:01 PM by Dr. Wes De Guzman M.D.      XR Hip With or Without Pelvis 2 - 3 View Left    Result Date: 12/6/2022  PROCEDURE:  XR HIP W OR WO PELVIS 2-3 VIEW LEFT-  HISTORY: New onset left hip pain.  COMPARISON: Abdominal radiographs 12/3/2022  FINDINGS:   3 views of the pelvis and left hip were obtained. There is partially imaged fusion hardware in the lower lumbar spine. There is mild bilateral hip osteoarthritis. No acute fracture or malalignment is identified. If there is persistent clinical concern for acute fracture or inability to bear weight, further evaluation with dedicated CT or MRI would be recommended. There are bilateral stent grafts projecting over the pelvis, which are incompletely assessed. There is calcific atherosclerosis. There are air-filled loops of small bowel and colon partially imaged in the lower abdomen/upper pelvis. Small bowel gas in the left lower quadrant has progressed from 12/3/2022. There is also air in the rectum.  This report was finalized on 12/6/2022 12:36 PM by Dr. Katja Pruitt M.D.        Impression:  Mr. Bo is an 82-year-old male with HTN, hyperlipidemia, BPH, CAD status post MI and stent, PAD status post left CEA 2017, and lumbar  stenosis who was admitted 11/23 to undergo lumbar laminectomy and fusion.   Postop course has been complicated by hypertension, hypo and hypernatremia, MESFIN, urinary retention, postop ileus, and suspected aspiration pneumonia (s/p treatment with Zosyn).  He seemed to be doing well initially after surgery but he became confused 1128 when developing ileus was identified.    Neurology was consulted 12/4 as he has had confusion since surgery which was attributed to metabolic encephalopathy with question of baseline cognitive impairment.  The patient was started on Zyprexa 5 mg daily with dinner 12/4 and is drowsy today despite sleeping through the night.  Per review of the chart the patient was living independently at home with his wife prior to this admission with no pre-existing diagnosis of dementia.     Work-up  CT head: No acute findings  Labs: Folate 6.14, B12 1653, ammonia level 25, TSH 2.88, RPR nonreactive     Diagnosis:  AMS, most likely secondary to toxic metabolic encephalopathy +/- delirium, resolving  The above impression statement reviewed and changes noted.    Plan:  Given significant improvement overnight no further neurological work-up is needed at this time.  He is currently getting scheduled Zyprexa 2.5 mg daily with dinner.  I would not recommend continuing this on discharge.  Can be stopped as he improves.  D/W Dr Colin today. Neurology will sign off but If further issues should develop please reconsult us.

## 2022-12-06 NOTE — THERAPY TREATMENT NOTE
Acute Care - Speech Language Pathology   Swallow Re-Assessment [facility]     Patient Name: Boni Hernandez  : 1940  MRN: [id number]  Today's Date: 2022               Admit Date: 2022    Visit Dx:     ICD-10-CM ICD-9-CM   1. Spinal stenosis of lumbar region with neurogenic claudication  M48.062 724.03     Patient Active Problem List   Diagnosis   • Lumbar spinal stenosis   • DDD (degenerative disc disease), lumbar   • Spondylolisthesis of lumbar region   • Hyperlipidemia   • HTN (hypertension)   • Coronary artery disease   • BPH (benign prostatic hyperplasia)   • GERD (gastroesophageal reflux disease)   • Constipation   • Postoperative urinary retention   • Postoperative ileus (HCC)   • Leukocytosis (leucocytosis)   • Spinal stenosis of lumbar region with neurogenic claudication   • Severe malnutrition (HCC)     Past Medical History:   Diagnosis Date   • BPH (benign prostatic hyperplasia)    • Cataract    • Coronary artery disease    • DDD (degenerative disc disease), lumbar    • GERD (gastroesophageal reflux disease)    • History of MI (myocardial infarction)     APPROX. 2 YEARS AGO, HAS STENT, FOLLOWED BY DR LOREDO, 10/21/19 STRESS TEST AT [facility]   • [place] (hard of hearing)    • HTN (hypertension)    • Hyperlipidemia    • Injury of back    • Low back pain     RADIATES DOWN BOTH LEGS   • Spinal stenosis      Past Surgical History:   Procedure Laterality Date   • CAROTID ENDARTERECTOMY Left 2017   • CATARACT EXTRACTION WITH INTRAOCULAR LENS IMPLANT Bilateral    • COLONOSCOPY     • CORONARY ANGIOPLASTY WITH STENT PLACEMENT  2016   • DENTAL PROCEDURE      DENTAL IMPLANTS   • EPIDURAL BLOCK     • LUMBAR DISCECTOMY FUSION INSTRUMENTATION N/A 2019    Procedure: Lumbar 4 to lumbar 5 laminectomy with a posterior lateral fusion and instrumentation;  Surgeon: Matthew Alex MD;  Location: [facility];  Service: Neurosurgery   • LUMBAR FUSION N/A 2022    Procedure: Lumbar 3 to lumbar 4  laminectomy with fusion and instrumentation and removal of previous instrumentation at lumbar 4 to lumbar 5;  Surgeon: Matthew Alex MD;  Location: [facility] MAIN OR;  Service: [employer] - Neuro;  Laterality: N/A;   • OTHER SURGICAL HISTORY  2015    PT STATES HE HAS HAD STENTS PUT IN BOTH LEGS.    • TONSILLECTOMY         SLP Recommendation and Plan                                                                                   SWALLOW EVALUATION (last 72 hours)     SLP Adult Swallow Evaluation     Row Name 12/06/22 1100                   Rehab Evaluation    Document Type re-evaluation  -CB        Subjective Information no complaints  -CB        Patient Observations alert;cooperative;agree to therapy  -CB        Patient Effort adequate  -CB        Comment Pt in bilateral restraints, RN, Jamir, removed  -CB           General Information    Patient Profile Reviewed yes  -CB              User Key  (r) = Recorded By, (t) = Taken By, (c) = Cosigned By    Initials Name Effective Dates    CB Asha Kilpatrick CCC-SLP 11/10/22 -                 EDUCATION  The patient has been educated in the following areas:   Dysphagia (Swallowing Impairment).              Time Calculation:    Time Calculation- SLP     Row Name 12/06/22 1104             Time Calculation- SLP    SLP Start Time 1045  -CB      SLP Received On 12/06/22  -CB         Untimed Charges    01059-PK Treatment Swallow Minutes 30  -CB         Total Minutes    Untimed Charges Total Minutes 30  -CB       Total Minutes 30  -CB            User Key  (r) = Recorded By, (t) = Taken By, (c) = Cosigned By    Initials Name Provider Type    Asha Ellington CCC-SLP Speech and Language Pathologist                Therapy Charges for Today     Code Description Service Date Service Provider Modifiers Qty    [id number]  ST EVAL ORAL PHARYNG SWALLOW 2 12/6/2022 Asha Kilpatrick CCC-SLP GN 1               VANDA Hernandez  12/6/2022

## 2022-12-07 LAB
ALBUMIN SERPL-MCNC: 2.5 G/DL (ref 3.5–5.2)
ALBUMIN/GLOB SERPL: 1 G/DL
ALP SERPL-CCNC: 118 U/L (ref 39–117)
ALT SERPL W P-5'-P-CCNC: 87 U/L (ref 1–41)
ANION GAP SERPL CALCULATED.3IONS-SCNC: 4 MMOL/L (ref 5–15)
AST SERPL-CCNC: 59 U/L (ref 1–40)
BILIRUB SERPL-MCNC: <0.2 MG/DL (ref 0–1.2)
BUN SERPL-MCNC: 24 MG/DL (ref 8–23)
BUN/CREAT SERPL: 31.2 (ref 7–25)
CALCIUM SPEC-SCNC: 7.8 MG/DL (ref 8.6–10.5)
CHLORIDE SERPL-SCNC: 110 MMOL/L (ref 98–107)
CO2 SERPL-SCNC: 27 MMOL/L (ref 22–29)
CREAT SERPL-MCNC: 0.77 MG/DL (ref 0.76–1.27)
DEPRECATED RDW RBC AUTO: 43.5 FL (ref 37–54)
EGFRCR SERPLBLD CKD-EPI 2021: 89.4 ML/MIN/1.73
ERYTHROCYTE [DISTWIDTH] IN BLOOD BY AUTOMATED COUNT: 13.5 % (ref 12.3–15.4)
GLOBULIN UR ELPH-MCNC: 2.5 GM/DL
GLUCOSE SERPL-MCNC: 125 MG/DL (ref 65–99)
HCT VFR BLD AUTO: 23.7 % (ref 37.5–51)
HGB BLD-MCNC: 7.3 G/DL (ref 13–17.7)
MCH RBC QN AUTO: 27.8 PG (ref 26.6–33)
MCHC RBC AUTO-ENTMCNC: 30.8 G/DL (ref 31.5–35.7)
MCV RBC AUTO: 90.1 FL (ref 79–97)
PLATELET # BLD AUTO: 291 10*3/MM3 (ref 140–450)
PMV BLD AUTO: 10 FL (ref 6–12)
POTASSIUM SERPL-SCNC: 3.7 MMOL/L (ref 3.5–5.2)
PROT SERPL-MCNC: 5 G/DL (ref 6–8.5)
RBC # BLD AUTO: 2.63 10*6/MM3 (ref 4.14–5.8)
SODIUM SERPL-SCNC: 141 MMOL/L (ref 136–145)
WBC NRBC COR # BLD: 9.74 10*3/MM3 (ref 3.4–10.8)

## 2022-12-07 PROCEDURE — 99231 SBSQ HOSP IP/OBS SF/LOW 25: CPT | Performed by: SURGERY

## 2022-12-07 PROCEDURE — 97530 THERAPEUTIC ACTIVITIES: CPT

## 2022-12-07 PROCEDURE — 80053 COMPREHEN METABOLIC PANEL: CPT | Performed by: HOSPITALIST

## 2022-12-07 PROCEDURE — 99024 POSTOP FOLLOW-UP VISIT: CPT | Performed by: NURSE PRACTITIONER

## 2022-12-07 PROCEDURE — 85027 COMPLETE CBC AUTOMATED: CPT | Performed by: NURSE PRACTITIONER

## 2022-12-07 PROCEDURE — 25010000002 ENOXAPARIN PER 10 MG: Performed by: NURSE PRACTITIONER

## 2022-12-07 PROCEDURE — 25010000002 METHYLNALTREXONE 12 MG/0.6ML SOLUTION: Performed by: SURGERY

## 2022-12-07 RX ORDER — POLYETHYLENE GLYCOL 3350 17 G/17G
17 POWDER, FOR SOLUTION ORAL DAILY PRN
Status: DISCONTINUED | OUTPATIENT
Start: 2022-12-07 | End: 2022-12-13 | Stop reason: HOSPADM

## 2022-12-07 RX ORDER — AMOXICILLIN 250 MG
1 CAPSULE ORAL 2 TIMES DAILY PRN
Status: DISCONTINUED | OUTPATIENT
Start: 2022-12-07 | End: 2022-12-13 | Stop reason: HOSPADM

## 2022-12-07 RX ADMIN — TAMSULOSIN HYDROCHLORIDE 0.4 MG: 0.4 CAPSULE ORAL at 08:09

## 2022-12-07 RX ADMIN — METOPROLOL TARTRATE 12.5 MG: 25 TABLET, FILM COATED ORAL at 20:20

## 2022-12-07 RX ADMIN — Medication 3 ML: at 20:21

## 2022-12-07 RX ADMIN — Medication 10 ML: at 20:21

## 2022-12-07 RX ADMIN — METHYLNALTREXONE BROMIDE 6 MG: 12 INJECTION, SOLUTION SUBCUTANEOUS at 08:09

## 2022-12-07 RX ADMIN — OLANZAPINE 2.5 MG: 2.5 TABLET ORAL at 17:00

## 2022-12-07 RX ADMIN — MULTIPLE VITAMINS W/ MINERALS TAB 1 TABLET: TAB at 08:09

## 2022-12-07 RX ADMIN — BISACODYL 10 MG: 10 SUPPOSITORY RECTAL at 08:09

## 2022-12-07 RX ADMIN — Medication 10 ML: at 08:09

## 2022-12-07 RX ADMIN — METOPROLOL TARTRATE 12.5 MG: 25 TABLET, FILM COATED ORAL at 10:36

## 2022-12-07 RX ADMIN — ENOXAPARIN SODIUM 40 MG: 100 INJECTION SUBCUTANEOUS at 17:00

## 2022-12-07 RX ADMIN — Medication 3 ML: at 08:09

## 2022-12-07 RX ADMIN — PANTOPRAZOLE SODIUM 40 MG: 40 INJECTION, POWDER, FOR SOLUTION INTRAVENOUS at 05:07

## 2022-12-07 NOTE — PROGRESS NOTES
Boni Hernandez   82 y.o.  male    LOS: 8 days   Patient Care Team:  Kiki Weiss APRN as PCP - General (Nurse Practitioner)  Mark Abraham MD as Consulting Physician (Cardiology)      Subjective     Interval History:     Patient Complaints: no complaints    Review of Systems:       Medication Review:   Current Facility-Administered Medications:   •  acetaminophen (TYLENOL) tablet 1,000 mg, 1,000 mg, Oral, Q6H PRN, Matthew Alex MD, 1,000 mg at 12/05/22 2159  •  bisacodyl (DULCOLAX) suppository 10 mg, 10 mg, Rectal, Daily, Corbin Mc MD, 10 mg at 12/07/22 0809  •  Enoxaparin Sodium (LOVENOX) syringe 40 mg, 40 mg, Subcutaneous, Q24H, Sakina Goldstein APRN, 40 mg at 12/06/22 1733  •  methylnaltrexone (RELISTOR) injection 6 mg, 6 mg, Subcutaneous, Every Other Day, Corbin Mc MD, 6 mg at 12/07/22 0809  •  multivitamin with minerals 1 tablet, 1 tablet, Oral, Daily, Matthew Alex MD, 1 tablet at 12/07/22 0809  •  nitroglycerin (NITROSTAT) SL tablet 0.4 mg, 0.4 mg, Sublingual, Q5 Min PRN, Matthew Alex MD  •  OLANZapine (zyPREXA) injection 5 mg, 5 mg, Intramuscular, Q4H PRN, Patrick Wilkinson MD, 5 mg at 12/04/22 1850  •  OLANZapine (zyPREXA) tablet 2.5 mg, 2.5 mg, Oral, Daily Before Supper, Dania Bennett APRN, 2.5 mg at 12/06/22 1732  •  ondansetron (ZOFRAN) tablet 4 mg, 4 mg, Oral, Q6H PRN **OR** ondansetron (ZOFRAN) injection 4 mg, 4 mg, Intravenous, Q6H PRN, Matthew Alex MD, 4 mg at 11/27/22 1104  •  pantoprazole (PROTONIX) injection 40 mg, 40 mg, Intravenous, Q AM, Mor Yost MD, 40 mg at 12/07/22 0507  •  sodium chloride 0.9 % flush 10 mL, 10 mL, Intravenous, PRN, Matthew Alex MD  •  sodium chloride 0.9 % flush 10 mL, 10 mL, Intravenous, Q12H, Mor Yost MD, 10 mL at 12/07/22 0809  •  sodium chloride 0.9 % flush 10 mL, 10 mL, Intravenous, PRN, Mor Yost MD  •  sodium chloride 0.9 % flush 20 mL, 20 mL, Intravenous, PRN, Mor Yost  MD NICK  •  sodium chloride 0.9 % flush 3 mL, 3 mL, Intravenous, Q12H, Matthew Alex MD, 3 mL at 12/07/22 0809  •  sodium chloride 0.9 % infusion 40 mL, 40 mL, Intravenous, PRN, Matthew Alex MD  •  tamsulosin (FLOMAX) 24 hr capsule 0.4 mg, 0.4 mg, Oral, Daily, Patrick Henry Jr., MD, 0.4 mg at 12/07/22 0809      Objective   Vital Sign Min/Max for last 24 hours  Temp  Min: 97.7 °F (36.5 °C)  Max: 99.4 °F (37.4 °C)   BP  Min: 81/48  Max: 148/49    Pulse  Min: 86  Max: 120     Wt Readings from Last 3 Encounters:   12/07/22 55.5 kg (122 lb 5.7 oz)   11/10/22 68.9 kg (152 lb)   10/27/22 70.3 kg (155 lb)        Intake/Output Summary (Last 24 hours) at 12/7/2022 0906  Last data filed at 12/6/2022 1400  Gross per 24 hour   Intake --   Output 150 ml   Net -150 ml     Physical Exam:      General Appearance:    Well developed and well nourished male in no acute distress   Head:    Normocephalic, atraumatic   Eyes:            Conjunctivae normal, anicteric, no xanthelasma   Neck:   supple, trachea midline, no thyromegaly, no carotid bruit, no JVD, no elevated CVP   Lungs:     Clear to auscultation,respirations regular, even and                  unlabored    Heart:    Regular rhythm and normal rate, normal S1 and S2,            No murmur, no gallop, no rub, no click   Chest Wall:    No abnormalities observed   Abdomen:     Normal bowel sounds, dht with tube feeding   Rectal:     Deferred   Extremities:   No edema. Moves all extremities well, no cyanosis, no erythema   Pulses:   Pulses palpable and equal bilaterally   Skin:   No bleeding, bruising or rash   Neurologic:   awake alert and oriented x3, speech clear and approp, no facial drooping     : voids   Monitor:  Sinus tach vr 110    Results Review:         Sodium Sodium   Date Value Ref Range Status   12/07/2022 141 136 - 145 mmol/L Final   12/06/2022 143 136 - 145 mmol/L Final   12/05/2022 143 136 - 145 mmol/L Final      Potassium Potassium   Date Value Ref Range  Status   12/07/2022 3.7 3.5 - 5.2 mmol/L Final   12/06/2022 3.9 3.5 - 5.2 mmol/L Final   12/05/2022 4.3 3.5 - 5.2 mmol/L Final      Chloride Chloride   Date Value Ref Range Status   12/07/2022 110 (H) 98 - 107 mmol/L Final   12/06/2022 113 (H) 98 - 107 mmol/L Final   12/05/2022 109 (H) 98 - 107 mmol/L Final      Bicarbonate No results found for: PLASMABICARB   BUN BUN   Date Value Ref Range Status   12/07/2022 24 (H) 8 - 23 mg/dL Final   12/06/2022 24 (H) 8 - 23 mg/dL Final   12/05/2022 20 8 - 23 mg/dL Final      Creatinine Creatinine   Date Value Ref Range Status   12/07/2022 0.77 0.76 - 1.27 mg/dL Final   12/06/2022 0.80 0.76 - 1.27 mg/dL Final   12/05/2022 0.73 (L) 0.76 - 1.27 mg/dL Final      Calcium Calcium   Date Value Ref Range Status   12/07/2022 7.8 (L) 8.6 - 10.5 mg/dL Final   12/06/2022 8.1 (L) 8.6 - 10.5 mg/dL Final   12/05/2022 8.5 (L) 8.6 - 10.5 mg/dL Final      Magnesium Magnesium   Date Value Ref Range Status   12/05/2022 1.7 1.6 - 2.4 mg/dL Final        Results from last 7 days   Lab Units 12/07/22  0524   WBC 10*3/mm3 9.74   HEMOGLOBIN g/dL 7.3*   HEMATOCRIT % 23.7*   PLATELETS 10*3/mm3 291     No results found for: TROPONINT         Echo EF Estimated  No results found for: ECHOEFEST      Assessment/ Plan  Assessment & Plan   Active Hospital Problems    Diagnosis  POA   • **Lumbar spinal stenosis [M48.061]  Yes     Priority: Low   • Severe malnutrition (HCC) [E43]  Yes     Priority: Low   • Spinal stenosis of lumbar region with neurogenic claudication [M48.062]  Yes     Priority: Low   • Postoperative urinary retention [N99.89, R33.8]  No     Priority: Low   • Postoperative ileus (HCC) [K91.89, K56.7]  No     Priority: Low   • Leukocytosis (leucocytosis) [D72.829]  No     Priority: Low       1. Urinary retention post-op back surgery 11/23 for spinal stenosis  Urology foll  -h/o bph  fc removed     2.  postoperative ileus- now resolved  DHT with tube feeding  gen surgery  foll     3. Hypertension with soft bp  - holding Coreg, Imdur, lisinopril     4. Hypernatremia/MESFIN  Renal foll- improving      5.  Metabolic encephalopathy  Neuro foll     6. Possible aspiration pneumonia vs pneumonitis  -XR showed LLL consolidation  -s/p  zosyn --primary foll  -MRSA screen negative     7. hyperlipidemia  Alt 103, ast 95- statin on hold     8. CAD    A.  stenting of the circumflex in 2016     B. Stenting of the LAD in March 2021     9.  Sinus tach     Plan  He is off DAPT however his stent was more than a year ago.   Add low dose BB      Janett Chavez, APRN  12/07/22  09:06 EST  Patient seen and examined to continue current treatment

## 2022-12-07 NOTE — PROGRESS NOTES
[facility]    Chart reviewed and therapy noted. In PT yesterday, patient stood with max A x 2 but minimally cleared bottom from bed and was unable to tolerated additional attempts at sit to stand due to c/o 10/10 pain. We will continue to monitor progress, currently unsure patient will be able to tolerate and participate in 3 hr of therapy a day on acute rehab. Also, currently no bed availability expected till middle next week at earliest.    Lyly Hernandez RN  Rehab Admissions Coordinator  [phone]

## 2022-12-07 NOTE — PLAN OF CARE
Goal Outcome Evaluation:  Plan of Care Reviewed With: patient        Progress: declining  Outcome Evaluation: Pt seen today by OT. He showing signficant improvements todays session. He is oriented other than the month and out of restraints today. He follows safety commands much better today. He continues to require X2 assist to take a few steps but is able to sit upright in chair today now that pt is more alert and out of restraints. Very unsteady on his feet though. Pt is able to brush teeth today with OT and was able to feed self lunch and breakfast today. Pt requires total A for donning socks and anticipate pt will need AE to complete to maintain spinal precautions post op but now that he is more oriented he would be more cogntively appropriate for ADL/AE training. With improvements noted today OT now recommending acute rehab.    OT wore appropriate PPE and completed hand hygiene.

## 2022-12-07 NOTE — PLAN OF CARE
Goal Outcome Evaluation:               Patient sat in bedside chair all morning, ate well for breakfast and lunch, tolerated the meals. Dr. Mc ordered TF/core trak to be removed, RN did so without complications.     Patient VS stable and he is without complaints. Assisted back to bed at this time and he denies any other needs at the moment.     RN will continue to round per hospital protocol

## 2022-12-07 NOTE — PLAN OF CARE
Problem: Adult Inpatient Plan of Care  Goal: Plan of Care Review  Outcome: Ongoing, Progressing   Goal Outcome Evaluation: VSS. Pt became a little confused in the middle of the night, but was easily re oriented. Cortrak in place, tube feeds infusing at 45/hr, which is goal. No complaints of pain. Pt had bm x1. Pt rested throughout the night with no other issues or complaints.

## 2022-12-07 NOTE — PROGRESS NOTES
[facility] Progress Note      Patient Name: Boni Hernandez  : 1940  MRN: [id number]  Primary Care Physician:  Kiki Weiss APRN  Date of admission: 2022    Subjective     Interval History:   Follow up  hypernatremia. On the phone with his wife.  When he hung up, he said he thought she had passed away. Diet advanced.  Eating.   Review of Systems:   As noted above    Objective     Vitals:   Temp:  [97.7 °F (36.5 °C)-99.4 °F (37.4 °C)] 98.5 °F (36.9 °C)  Heart Rate:  [] 105  Resp:  [18-20] 18  BP: (132-153)/(49-78) 153/78  Flow (L/min):  [2-4] 2    Intake/Output Summary (Last 24 hours) at 2022 1651  Last data filed at 2022 1400  Gross per 24 hour   Intake 360 ml   Output --   Net 360 ml       Physical Exam:    Constitutional: more alert, confused. Oriented to self, not place or situation. [place].  chronically ill.   HEENT: Sclera anicteric.   oral mucosa moist .  Neck:  no JVD  Heart : RRR, no s3  Lungs clear to auscultation   Abd: BS +, soft, nontender.  Ext: No edema.  Skin: Warm and dry      Scheduled Meds:     bisacodyl, 10 mg, Rectal, Daily  enoxaparin, 40 mg, Subcutaneous, Q24H  methylnaltrexone, 6 mg, Subcutaneous, Every Other Day  metoprolol tartrate, 12.5 mg, Oral, Q12H  multivitamin with minerals, 1 tablet, Oral, Daily  OLANZapine, 2.5 mg, Oral, Daily Before Supper  pantoprazole, 40 mg, Intravenous, Q AM  sodium chloride, 10 mL, Intravenous, Q12H  sodium chloride, 3 mL, Intravenous, Q12H  tamsulosin, 0.4 mg, Oral, Daily      IV Meds:        Results Reviewed:   I have personally reviewed the results from the time of this admission to 2022 16:51 EST     Results from last 7 days   Lab Units 22  0524 22  0555 22  0801   SODIUM mmol/L 141 143 143   POTASSIUM mmol/L 3.7 3.9 4.3   CHLORIDE mmol/L 110* 113* 109*   CO2 mmol/L 27.0 23.7 25.8   BUN mg/dL 24* 24* 20   CREATININE mg/dL 0.77 0.80 0.73*   CALCIUM mg/dL 7.8* 8.1* 8.5*   BILIRUBIN  mg/dL <0.2 0.2 0.3   ALK PHOS U/L 118* 124* 106   ALT (SGPT) U/L 87* 103* 82*   AST (SGOT) U/L 59* 95* 57*   GLUCOSE mg/dL 125* 129* 146*       Estimated Creatinine Clearance: 58.1 mL/min (by C-G formula based on SCr of 0.77 mg/dL).    Results from last 7 days   Lab Units 12/05/22  0643 12/03/22  1159 12/02/22  0744   MAGNESIUM mg/dL 1.7 2.2 2.7*   PHOSPHORUS mg/dL 4.8* 2.5 2.4*             Results from last 7 days   Lab Units 12/07/22  0524 12/06/22  0555 12/05/22  0643 12/04/22  0414 12/03/22  1159   WBC 10*3/mm3 9.74 10.14 8.65 7.14 6.45   HEMOGLOBIN g/dL 7.3* 8.2* 7.9* 7.9* 8.0*   PLATELETS 10*3/mm3 291 283 250 232 241             Assessment / Plan     ASSESSMENT:  1.  Hypovolemic hyponatremia, acute on chronic. Resolved. Hypernatremia also resolved.    2.  MESFIN, creatinine stable. : likely prerenal from volume contraction, normalized.   3.  Lumbar spinal stenosis s/p laminectomy and facetectomy on 11/23  4.  Postoperative ileus: NG tube in place  5.  Hypertension, controlled.    6.  Urinary retention, s/p [place] catheter. Now removed. Has external catheter.   7.  Anemia Hg down to 7.3 today.  Recheck.  May need PRBC if it falls further .  8. Elevated transaminases. Improving .  PLAN:  1. Will sign off. Please call with questions.  Neida Webb MD  12/07/22  16:51 EST    [facility]  [phone]

## 2022-12-07 NOTE — PROGRESS NOTES
Chart reviewed.   Therapy notes reviewed.  Discussed with Rehab Admissions.  Took a few steps with two person assist.  Follow his progress with therapies/ cognition to determine if he is a candidate for 3 hours of therapy a day for acute inpatient rehab.

## 2022-12-07 NOTE — THERAPY TREATMENT NOTE
Patient Name: Boni Hernandez  : 1940    MRN: [id number]                              Today's Date: 2022       Admit Date: 2022    Visit Dx:     ICD-10-CM ICD-9-CM   1. Spinal stenosis of lumbar region with neurogenic claudication  M48.062 724.03     Patient Active Problem List   Diagnosis   • Lumbar spinal stenosis   • DDD (degenerative disc disease), lumbar   • Spondylolisthesis of lumbar region   • Hyperlipidemia   • HTN (hypertension)   • Coronary artery disease   • BPH (benign prostatic hyperplasia)   • GERD (gastroesophageal reflux disease)   • Constipation   • Postoperative urinary retention   • Postoperative ileus (HCC)   • Leukocytosis (leucocytosis)   • Spinal stenosis of lumbar region with neurogenic claudication   • Severe malnutrition (HCC)     Past Medical History:   Diagnosis Date   • BPH (benign prostatic hyperplasia)    • Cataract    • Coronary artery disease    • DDD (degenerative disc disease), lumbar    • GERD (gastroesophageal reflux disease)    • History of MI (myocardial infarction)     APPROX. 2 YEARS AGO, HAS STENT, FOLLOWED BY DR LOREDO, 10/21/19 STRESS TEST AT [facility]   • [place] (hard of hearing)    • HTN (hypertension)    • Hyperlipidemia    • Injury of back    • Low back pain     RADIATES DOWN BOTH LEGS   • Spinal stenosis      Past Surgical History:   Procedure Laterality Date   • CAROTID ENDARTERECTOMY Left 2017   • CATARACT EXTRACTION WITH INTRAOCULAR LENS IMPLANT Bilateral    • COLONOSCOPY     • CORONARY ANGIOPLASTY WITH STENT PLACEMENT  2016   • DENTAL PROCEDURE      DENTAL IMPLANTS   • EPIDURAL BLOCK     • LUMBAR DISCECTOMY FUSION INSTRUMENTATION N/A 2019    Procedure: Lumbar 4 to lumbar 5 laminectomy with a posterior lateral fusion and instrumentation;  Surgeon: Matthew Alex MD;  Location: [facility];  Service: Neurosurgery   • LUMBAR FUSION N/A 2022    Procedure: Lumbar 3 to lumbar 4 laminectomy with fusion and instrumentation and removal of  previous instrumentation at lumbar 4 to lumbar 5;  Surgeon: Matthew Alex MD;  Location: [facility];  Service: [employer] - Neuro;  Laterality: N/A;   • OTHER SURGICAL HISTORY  2015    PT STATES HE HAS HAD STENTS PUT IN BOTH LEGS.    • TONSILLECTOMY        General Information     Row Name 12/07/22 1453          OT Time and Intention    Document Type therapy note (daily note)  -     Mode of Treatment occupational therapy;individual therapy  -     Row Name 12/07/22 1453          General Information    Patient Profile Reviewed yes  -     Existing Precautions/Restrictions fall;spinal  -     Row Name 12/07/22 1453          Cognition    Orientation Status (Cognition) oriented to;person;place;situation  states wrong month  -     Row Name 12/07/22 1453          Safety Issues, Functional Mobility    Impairments Affecting Function (Mobility) balance;cognition;endurance/activity tolerance;strength  -           User Key  (r) = Recorded By, (t) = Taken By, (c) = Cosigned By    Initials Name Provider Type     Mar Saba OT Occupational Therapist                 Mobility/ADL's     Row Name 12/07/22 1454          Bed Mobility    Supine-Sit [place] (Bed Mobility) minimum assist (75% patient effort);verbal cues;nonverbal cues (demo/gesture)  -     Assistive Device (Bed Mobility) head of bed elevated  -     Row Name 12/07/22 1454          Bed-Chair Transfer    Bed-Chair [place] (Transfers) moderate assist (50% patient effort);2 person assist;verbal cues  -     Assistive Device (Bed-Chair Transfers) walker, front-wheeled  -     Row Name 12/07/22 1454          Sit-Stand Transfer    Sit-Stand [place] (Transfers) minimum assist (75% patient effort);2 person assist;verbal cues  -     Assistive Device (Sit-Stand Transfers) walker, 4-wheeled  -     Row Name 12/07/22 1454          Functional Mobility    Functional Mobility- Comment pt only able to take a few steps to chair today, BLE very  shaky and unsteady  -     Row Name 12/07/22 1454          Grooming Assessment/Training    [place] Level (Grooming) grooming skills;oral care regimen;standby assist  -     Position (Grooming) supported sitting  -     Row Name 12/07/22 1454          Lower Body Dressing Assessment/Training    [place] Level (Lower Body Dressing) lower body dressing skills;don;socks;dependent (less than 25% patient effort)  -     Position (Lower Body Dressing) supported sitting  -     Comment, (Lower Body Dressing) unable to safely maintain spinal precautions or complete figure 4 sitting today  -           User Key  (r) = Recorded By, (t) = Taken By, (c) = Cosigned By    Initials Name Provider Type    Mar Henriquez OT Occupational Therapist               Obj/Interventions     Row Name 12/07/22 1457          Shoulder (Therapeutic Exercise)    Shoulder (Therapeutic Exercise) AROM (active range of motion)  -     Shoulder AROM (Therapeutic Exercise) bilateral;flexion;extension;aBduction;aDduction;10 repetitions;sitting  forward punches  -     Row Name 12/07/22 1457          Motor Skills    Therapeutic Exercise shoulder  -     Row Name 12/07/22 1457          Balance    Static Sitting Balance standby assist  -     Dynamic Sitting Balance standby assist  -     Position, Sitting Balance sitting edge of bed  -     Static Standing Balance contact guard;2-person assist  -     Dynamic Standing Balance minimal assist;moderate assist;2-person assist  -     Position/Device Used, Standing Balance supported;walker, rolling  -           User Key  (r) = Recorded By, (t) = Taken By, (c) = Cosigned By    Initials Name Provider Type    Mar Henriquez OT Occupational Therapist               Goals/Plan    No documentation.                Clinical Impression     Row Name 12/07/22 1458          Pain Assessment    Pretreatment Pain Rating 0/10 - no pain  -     Posttreatment Pain Rating 0/10 - no pain  -SM      Row Name 12/07/22 1458          Plan of Care Review    Plan of Care Reviewed With patient  -     Outcome Evaluation Pt seen today by OT. He showing signficant improvements todays session. He is oriented other than the month and out of restraints today. He follows safety commands much better today. He continues to require X2 assist to take a few steps but is able to sit upright in chair today now that pt is more alert and out of restraints. Very unsteady on his feet though. Pt is able to brush teeth today with OT and was able to feed self lunch and breakfast today. Pt requires total A for donning socks and anticipate pt will need AE to complete to maintain spinal precautions post op but now that he is more oriented he would be more cogntively appropriate for ADL/AE training. With improvements noted today OT now recommending acute rehab.  -     Row Name 12/07/22 1458          Therapy Plan Review/Discharge Plan (OT)    Anticipated Discharge Disposition (OT) inpatient rehabilitation facility  -     Row Name 12/07/22 1458          Positioning and Restraints    Pre-Treatment Position in bed  -SM     Post Treatment Position chair  -SM     In Chair reclined;call light within reach;encouraged to call for assist;exit alarm on;notified [facility]  -           User Key  (r) = Recorded By, (t) = Taken By, (c) = Cosigned By    Initials Name Provider Type    Mar Henriquez, [place] Occupational Therapist               Outcome Measures     Row Name 12/07/22 3723          How much help from another is currently needed...    Putting on and taking off regular lower body clothing? 1  -SM     Bathing (including washing, rinsing, and drying) 2  -SM     Toileting (which includes using toilet bed pan or urinal) 2  -SM     Putting on and taking off regular upper body clothing 3  -SM     Taking care of personal grooming (such as brushing teeth) 3  -SM     Eating meals 4  -SM     AM-PAC 6 Clicks Score (OT) 15  -SM     Row Name 12/07/22  1503          Functional Assessment    Outcome Measure Options AM-PAC 6 Clicks Daily Activity (OT)  -           User Key  (r) = Recorded By, (t) = Taken By, (c) = Cosigned By    Initials Name Provider Type    Mar Henriquez OT Occupational Therapist                Occupational Therapy Education     Title: PT OT SLP Therapies (Done)     Topic: Occupational Therapy (Done)     Point: ADL training (Done)     Description:   Instruct learner(s) on proper safety adaptation and remediation techniques during self care or transfers.   Instruct in proper use of assistive devices.              Learning Progress Summary           Patient Acceptance, E,TB,D, VU,DU by  at 11/26/2022 1404    Comment: ed pt on role of OT. benefit of therapy, POC w. OT. ed on safety w ADL and tsf. ed on log roll and back precautions. pt demo wmin A bed mobility.   Family Acceptance, E,TB,D, VU,DU by  at 11/26/2022 1404    Comment: ed pt on role of OT. benefit of therapy, POC w. OT. ed on safety w ADL and tsf. ed on log roll and back precautions. pt demo wmin A bed mobility.                   Point: Home exercise program (Done)     Description:   Instruct learner(s) on appropriate technique for monitoring, assisting and/or progressing therapeutic exercises/activities.              Learning Progress Summary           Patient Acceptance, E,TB,D, VU,DU by  at 11/26/2022 1404    Comment: ed pt on role of OT. benefit of therapy, POC w. OT. ed on safety w ADL and tsf. ed on log roll and back precautions. pt demo wmin A bed mobility.   Family Acceptance, E,TB,D, VU,DU by  at 11/26/2022 1404    Comment: ed pt on role of OT. benefit of therapy, POC w. OT. ed on safety w ADL and tsf. ed on log roll and back precautions. pt demo wmin A bed mobility.                   Point: Precautions (Done)     Description:   Instruct learner(s) on prescribed precautions during self-care and functional transfers.              Learning Progress Summary            Patient Acceptance, E,TB,D, VU,DU by  at 11/26/2022 1404    Comment: ed pt on role of OT. benefit of therapy, POC w. OT. ed on safety w ADL and tsf. ed on log roll and back precautions. pt demo wmin A bed mobility.   Family Acceptance, E,TB,D, VU,DU by  at 11/26/2022 1404    Comment: ed pt on role of OT. benefit of therapy, POC w. OT. ed on safety w ADL and tsf. ed on log roll and back precautions. pt demo wmin A bed mobility.                   Point: Body mechanics (Done)     Description:   Instruct learner(s) on proper positioning and spine alignment during self-care, functional mobility activities and/or exercises.              Learning Progress Summary           Patient Acceptance, E,TB,D, VU,DU by  at 11/26/2022 1404    Comment: ed pt on role of OT. benefit of therapy, POC w. OT. ed on safety w ADL and tsf. ed on log roll and back precautions. pt demo wmin A bed mobility.   Family Acceptance, E,TB,D, VU,DU by  at 11/26/2022 1404    Comment: ed pt on role of OT. benefit of therapy, POC w. OT. ed on safety w ADL and tsf. ed on log roll and back precautions. pt demo wmin A bed mobility.                               User Key     Initials Effective Dates Name Provider Type Discipline     06/16/21 -  Lina Berry, OTR Occupational Therapist OT              OT Recommendation and Plan     Plan of Care Review  Plan of Care Reviewed With: patient  Progress: declining  Outcome Evaluation: Pt seen today by OT. He showing signficant improvements todays session. He is oriented other than the month and out of restraints today. He follows safety commands much better today. He continues to require X2 assist to take a few steps but is able to sit upright in chair today now that pt is more alert and out of restraints. Very unsteady on his feet though. Pt is able to brush teeth today with OT and was able to feed self lunch and breakfast today. Pt requires total A for donning socks and anticipate pt will need  AE to complete to maintain spinal precautions post op but now that he is more oriented he would be more cogntively appropriate for ADL/AE training. With improvements noted today OT now recommending acute rehab.     Time Calculation:    Time Calculation- OT     Row Name 12/07/22 1504             Time Calculation- OT    OT Start Time 1311  -SM      OT Stop Time 1330  -SM      OT Time Calculation (min) 19 min  -SM      Total Timed Code Minutes- OT 19 minute(s)  -SM      OT Received On 12/07/22  -      OT - Next Appointment 12/08/22  -         Timed Charges    05940 - OT Therapeutic Activity Minutes 19  -SM         Total Minutes    Timed Charges Total Minutes 19  -SM       Total Minutes 19  -SM            User Key  (r) = Recorded By, (t) = Taken By, (c) = Cosigned By    Initials Name Provider Type     Mar Saba OT Occupational Therapist              Therapy Charges for Today     Code Description Service Date Service Provider Modifiers Qty    [id number] HC OT THERAPEUTIC ACT EA 15 MIN 12/7/2022 Mar Saba OT GO 1               Mar Saba OT  12/7/2022

## 2022-12-07 NOTE — PROGRESS NOTES
[facility] NEUROSURGERY PROGRESS NOTE      CC: POD#14 bilateral lami, neurolysis of L4 nerve root with fusion and instrumentation for severe pain in the legs and back.      Subjective     Interval History: No events reported overnight.  States taht he is feeling better.  States that he does have increased pain in left hip with movement, but denies any n/t in BLE, or other pain.     ROS:  Constitutional: No fever, chills  GI: No nausea, vomiting  MS: back pain  Neuro: No numbness, tingling, and generalized weakness, balance difficulties  : No difficulty voiding, no incontinence    Objective     Vital signs in last 24 hours:  Temp:  [97.7 °F (36.5 °C)-99.4 °F (37.4 °C)] 97.7 °F (36.5 °C)  Heart Rate:  [] 86  Resp:  [18-20] 18  BP: ()/(48-74) 148/49    Intake/Output this shift:  No intake/output data recorded.    LABS:  Results from last 7 days   Lab Units 12/07/22  0524 12/06/22  0555 12/05/22  0643   WBC 10*3/mm3 9.74 10.14 8.65   HEMOGLOBIN g/dL 7.3* 8.2* 7.9*   HEMATOCRIT % 23.7* 26.4* 29.4*   PLATELETS 10*3/mm3 291 283 250     Results from last 7 days   Lab Units 12/07/22  0524 12/06/22  0555 12/05/22  0801   SODIUM mmol/L 141 143 143   POTASSIUM mmol/L 3.7 3.9 4.3   CHLORIDE mmol/L 110* 113* 109*   CO2 mmol/L 27.0 23.7 25.8   BUN mg/dL 24* 24* 20   CREATININE mg/dL 0.77 0.80 0.73*   CALCIUM mg/dL 7.8* 8.1* 8.5*   BILIRUBIN mg/dL <0.2 0.2 0.3   ALK PHOS U/L 118* 124* 106   ALT (SGPT) U/L 87* 103* 82*   AST (SGOT) U/L 59* 95* 57*   GLUCOSE mg/dL 125* 129* 146*         IMAGING STUDIES:  XR of left hip revealed mild bilateral hip osteoarthritis.  No acute fracure or malalingnment identified.  If persistent clinical concern for acute fracture or inability to bear weight. There is calcific atherosclerosis. There are  air-filled loops of small bowel and colon partially imaged in the lower  abdomen/upper pelvis. Small bowel gas in the left lower quadrant has  progressed from 12/3/2022. There is also air in the  rectum.     I personally reviewed and interpreted the patient's report and imaging.    Meds reviewed/changed: Yes    Current Facility-Administered Medications:   •  acetaminophen (TYLENOL) tablet 1,000 mg, 1,000 mg, Oral, Q6H PRN, Matthew Alex MD, 1,000 mg at 12/05/22 2159  •  bisacodyl (DULCOLAX) suppository 10 mg, 10 mg, Rectal, Daily, Corbin Mc MD, 10 mg at 12/07/22 0809  •  Enoxaparin Sodium (LOVENOX) syringe 40 mg, 40 mg, Subcutaneous, Q24H, Sakina Goldstein APRN, 40 mg at 12/06/22 1733  •  methylnaltrexone (RELISTOR) injection 6 mg, 6 mg, Subcutaneous, Every Other Day, Corbin Mc MD, 6 mg at 12/07/22 0809  •  multivitamin with minerals 1 tablet, 1 tablet, Oral, Daily, Matthew Alex MD, 1 tablet at 12/07/22 0809  •  nitroglycerin (NITROSTAT) SL tablet 0.4 mg, 0.4 mg, Sublingual, Q5 Min PRN, Matthew Alex MD  •  OLANZapine (zyPREXA) injection 5 mg, 5 mg, Intramuscular, Q4H PRN, Patrick Wilkinson MD, 5 mg at 12/04/22 1850  •  OLANZapine (zyPREXA) tablet 2.5 mg, 2.5 mg, Oral, Daily Before Supper, Dania Bennett APRN, 2.5 mg at 12/06/22 1732  •  ondansetron (ZOFRAN) tablet 4 mg, 4 mg, Oral, Q6H PRN **OR** ondansetron (ZOFRAN) injection 4 mg, 4 mg, Intravenous, Q6H PRN, Matthew Alex MD, 4 mg at 11/27/22 1104  •  pantoprazole (PROTONIX) injection 40 mg, 40 mg, Intravenous, Q AM, Mor Yost MD, 40 mg at 12/07/22 0507  •  sodium chloride 0.9 % flush 10 mL, 10 mL, Intravenous, PRN, Matthew Alex MD  •  sodium chloride 0.9 % flush 10 mL, 10 mL, Intravenous, Q12H, Mor Yost MD, 10 mL at 12/07/22 0809  •  sodium chloride 0.9 % flush 10 mL, 10 mL, Intravenous, PRN, Mor Yost MD  •  sodium chloride 0.9 % flush 20 mL, 20 mL, Intravenous, PRN, Mor Yost MD  •  sodium chloride 0.9 % flush 3 mL, 3 mL, Intravenous, Q12H, Matthew Alex MD, 3 mL at 12/07/22 0809  •  sodium chloride 0.9 % infusion 40 mL, 40 mL, Intravenous, PRN, Isai,  "Matthew ROMERO MD  •  tamsulosin (FLOMAX) 24 hr capsule 0.4 mg, 0.4 mg, Oral, Daily, Patrick Henry Jr., MD, 0.4 mg at 12/07/22 0809      Physical Exam:    General:   Awake, alert, oriented x3. Speech clear with no aphasia, left nare DHT  Back:    Incision midline with no drainage, no erythema, no edema  Motor: Moves BLE well, with increased activity he has increased pain in left hip  Sensation: Normal to light touch  Coordination: Moves all extremities well  Station and Gait:             Defer to PT  Extremities:   Wearing SCD      Assessment & Plan     ASSESSMENT:      Lumbar spinal stenosis    Postoperative urinary retention    Postoperative ileus (HCC)    Leukocytosis (leucocytosis)    Spinal stenosis of lumbar region with neurogenic claudication    Severe malnutrition (HCC)    83yo admitted for L3-4 fusion/instrumentation with postop complications of ileus, urinary retention, hyponatremia/hypovolemia, asp pna.    PLAN:   Keep f/c-managed by urology  GI following ileus  Neurology following for AMS  No fracture or alignment noted in hip x-ray, osteoarthritis  Encourage ambulation, as tolerated  Encourage IS-ordered    I discussed the patient's findings and my recommendations with patient and Dr. Alex       LOS: 8 days       Lexi Ruth, APRN  12/7/2022  08:57 EST    \"Dictated utilizing Dragon dictation\".      "

## 2022-12-08 LAB
ALBUMIN SERPL-MCNC: 2.4 G/DL (ref 3.5–5.2)
ALBUMIN/GLOB SERPL: 0.9 G/DL
ALP SERPL-CCNC: 110 U/L (ref 39–117)
ALT SERPL W P-5'-P-CCNC: 77 U/L (ref 1–41)
ANION GAP SERPL CALCULATED.3IONS-SCNC: 4.7 MMOL/L (ref 5–15)
AST SERPL-CCNC: 53 U/L (ref 1–40)
BILIRUB SERPL-MCNC: <0.2 MG/DL (ref 0–1.2)
BUN SERPL-MCNC: 20 MG/DL (ref 8–23)
BUN/CREAT SERPL: 28.6 (ref 7–25)
CALCIUM SPEC-SCNC: 7.8 MG/DL (ref 8.6–10.5)
CHLORIDE SERPL-SCNC: 106 MMOL/L (ref 98–107)
CO2 SERPL-SCNC: 26.3 MMOL/L (ref 22–29)
CREAT SERPL-MCNC: 0.7 MG/DL (ref 0.76–1.27)
DEPRECATED RDW RBC AUTO: 42.3 FL (ref 37–54)
EGFRCR SERPLBLD CKD-EPI 2021: 92 ML/MIN/1.73
ERYTHROCYTE [DISTWIDTH] IN BLOOD BY AUTOMATED COUNT: 13.2 % (ref 12.3–15.4)
GLOBULIN UR ELPH-MCNC: 2.7 GM/DL
GLUCOSE SERPL-MCNC: 103 MG/DL (ref 65–99)
HCT VFR BLD AUTO: 22.6 % (ref 37.5–51)
HGB BLD-MCNC: 7.2 G/DL (ref 13–17.7)
MCH RBC QN AUTO: 27.8 PG (ref 26.6–33)
MCHC RBC AUTO-ENTMCNC: 31.9 G/DL (ref 31.5–35.7)
MCV RBC AUTO: 87.3 FL (ref 79–97)
PLATELET # BLD AUTO: 270 10*3/MM3 (ref 140–450)
PMV BLD AUTO: 9.9 FL (ref 6–12)
POTASSIUM SERPL-SCNC: 3.6 MMOL/L (ref 3.5–5.2)
PROT SERPL-MCNC: 5.1 G/DL (ref 6–8.5)
RBC # BLD AUTO: 2.59 10*6/MM3 (ref 4.14–5.8)
SODIUM SERPL-SCNC: 137 MMOL/L (ref 136–145)
WBC NRBC COR # BLD: 7.64 10*3/MM3 (ref 3.4–10.8)

## 2022-12-08 PROCEDURE — 80053 COMPREHEN METABOLIC PANEL: CPT | Performed by: HOSPITALIST

## 2022-12-08 PROCEDURE — 25010000002 ENOXAPARIN PER 10 MG: Performed by: NURSE PRACTITIONER

## 2022-12-08 PROCEDURE — 85027 COMPLETE CBC AUTOMATED: CPT | Performed by: NURSE PRACTITIONER

## 2022-12-08 PROCEDURE — 99024 POSTOP FOLLOW-UP VISIT: CPT

## 2022-12-08 RX ORDER — CLOPIDOGREL BISULFATE 75 MG/1
75 TABLET ORAL DAILY
Status: DISCONTINUED | OUTPATIENT
Start: 2022-12-08 | End: 2022-12-13 | Stop reason: HOSPADM

## 2022-12-08 RX ADMIN — TAMSULOSIN HYDROCHLORIDE 0.4 MG: 0.4 CAPSULE ORAL at 09:54

## 2022-12-08 RX ADMIN — MULTIPLE VITAMINS W/ MINERALS TAB 1 TABLET: TAB at 09:54

## 2022-12-08 RX ADMIN — Medication 3 ML: at 21:02

## 2022-12-08 RX ADMIN — ACETAMINOPHEN 1000 MG: 325 TABLET, FILM COATED ORAL at 12:38

## 2022-12-08 RX ADMIN — ENOXAPARIN SODIUM 40 MG: 100 INJECTION SUBCUTANEOUS at 18:01

## 2022-12-08 RX ADMIN — PANTOPRAZOLE SODIUM 40 MG: 40 INJECTION, POWDER, FOR SOLUTION INTRAVENOUS at 05:21

## 2022-12-08 RX ADMIN — Medication 10 ML: at 21:02

## 2022-12-08 RX ADMIN — METOPROLOL TARTRATE 12.5 MG: 25 TABLET, FILM COATED ORAL at 09:55

## 2022-12-08 RX ADMIN — METOPROLOL TARTRATE 12.5 MG: 25 TABLET, FILM COATED ORAL at 21:01

## 2022-12-08 RX ADMIN — CLOPIDOGREL 75 MG: 75 TABLET, FILM COATED ORAL at 12:38

## 2022-12-08 RX ADMIN — BISACODYL 10 MG: 10 SUPPOSITORY RECTAL at 09:55

## 2022-12-08 RX ADMIN — Medication 10 ML: at 09:55

## 2022-12-08 RX ADMIN — OLANZAPINE 2.5 MG: 2.5 TABLET ORAL at 18:01

## 2022-12-08 RX ADMIN — ACETAMINOPHEN 1000 MG: 325 TABLET, FILM COATED ORAL at 21:01

## 2022-12-08 NOTE — PROGRESS NOTES
Boni Hernandez   82 y.o.  male    LOS: 9 days   Patient Care Team:  Kiki Weiss APRN as PCP - General (Nurse Practitioner)  Mark Abraham MD as Consulting Physician (Cardiology)      Subjective   Doing well  Interval History:     Patient Complaints:     Review of Systems:       Medication Review:   Current Facility-Administered Medications:   •  acetaminophen (TYLENOL) tablet 1,000 mg, 1,000 mg, Oral, Q6H PRN, Matthew Alex MD, 1,000 mg at 12/05/22 2159  •  bisacodyl (DULCOLAX) suppository 10 mg, 10 mg, Rectal, Daily, Corbin Mc MD, 10 mg at 12/08/22 0955  •  Enoxaparin Sodium (LOVENOX) syringe 40 mg, 40 mg, Subcutaneous, Q24H, Sakina Goldstein APRN, 40 mg at 12/07/22 1700  •  methylnaltrexone (RELISTOR) injection 6 mg, 6 mg, Subcutaneous, Every Other Day, Corbin Mc MD, 6 mg at 12/07/22 0809  •  metoprolol tartrate (LOPRESSOR) tablet 12.5 mg, 12.5 mg, Oral, Q12H, Janett Chavez APRN, 12.5 mg at 12/08/22 0955  •  multivitamin with minerals 1 tablet, 1 tablet, Oral, Daily, Matthew Alex MD, 1 tablet at 12/08/22 0954  •  nitroglycerin (NITROSTAT) SL tablet 0.4 mg, 0.4 mg, Sublingual, Q5 Min PRN, Matthew Alex MD  •  OLANZapine (zyPREXA) injection 5 mg, 5 mg, Intramuscular, Q4H PRN, Patrick Wilkinson MD, 5 mg at 12/04/22 1850  •  OLANZapine (zyPREXA) tablet 2.5 mg, 2.5 mg, Oral, Daily Before Supper, Dania Bennett APRN, 2.5 mg at 12/07/22 1700  •  ondansetron (ZOFRAN) tablet 4 mg, 4 mg, Oral, Q6H PRN **OR** ondansetron (ZOFRAN) injection 4 mg, 4 mg, Intravenous, Q6H PRN, Matthew Alex MD, 4 mg at 11/27/22 1104  •  pantoprazole (PROTONIX) injection 40 mg, 40 mg, Intravenous, Q AM, Mor Yost MD, 40 mg at 12/08/22 0521  •  polyethylene glycol (MIRALAX) packet 17 g, 17 g, Oral, Daily PRN, Corbin Mc MD  •  sennosides-docusate (PERICOLACE) 8.6-50 MG per tablet 1 tablet, 1 tablet, Oral, BID PRN, Corbin Mc MD  •  sodium chloride 0.9 %  flush 10 mL, 10 mL, Intravenous, PRN, Matthew Alex MD  •  sodium chloride 0.9 % flush 10 mL, 10 mL, Intravenous, Q12H, Mor Yost MD, 10 mL at 12/08/22 0955  •  sodium chloride 0.9 % flush 10 mL, 10 mL, Intravenous, PRN, Mor Yost MD  •  sodium chloride 0.9 % flush 20 mL, 20 mL, Intravenous, PRN, Mor Yost MD  •  sodium chloride 0.9 % flush 3 mL, 3 mL, Intravenous, Q12H, Matthew Alex MD, 3 mL at 12/07/22 2021  •  sodium chloride 0.9 % infusion 40 mL, 40 mL, Intravenous, PRN, Matthew Alex MD  •  tamsulosin (FLOMAX) 24 hr capsule 0.4 mg, 0.4 mg, Oral, Daily, Patrick Henry Jr., MD, 0.4 mg at 12/08/22 0954      Objective   Vital Sign Min/Max for last 24 hours  Temp  Min: 97.9 °F (36.6 °C)  Max: 99.1 °F (37.3 °C)   BP  Min: 132/55  Max: 153/78    Pulse  Min: 86  Max: 105     Wt Readings from Last 3 Encounters:   12/08/22 56 kg (123 lb 7.3 oz)   11/10/22 68.9 kg (152 lb)   10/27/22 70.3 kg (155 lb)        Intake/Output Summary (Last 24 hours) at 12/8/2022 1153  Last data filed at 12/8/2022 0900  Gross per 24 hour   Intake 360 ml   Output --   Net 360 ml     Physical Exam:      General Appearance:    Well developed and well nourished in no acute distress   Head:    Normocephalic, atraumatic   Eyes:            Conjunctivae normal, anicteric, no xanthelasma   Neck:   supple, trachea midline, no thyromegaly, no carotid bruit, no JVD, no elevated CVP   Lungs:     Clear to auscultation,respirations regular, even and                  unlabored    Heart:    Regular rhythm and normal rate, normal S1 and S2,            No murmur, no gallop, no rub, no click   Chest Wall:    No abnormalities observed   Abdomen:     Normal bowel sounds, no masses, no organomegaly, soft        nontender, nondistended, no guarding, no rebound                tenderness   Rectal:     Deferred   Extremities:   No edema. Moves all extremities well, no cyanosis, no erythema   Pulses:   Pulses palpable and equal  bilaterally   Skin:   No bleeding, bruising or rash   Neurologic:   awake alert and oriented x3, speech clear and approp, no facial drooping     :    Monitor:      Results Review:         Sodium Sodium   Date Value Ref Range Status   12/08/2022 137 136 - 145 mmol/L Final   12/07/2022 141 136 - 145 mmol/L Final   12/06/2022 143 136 - 145 mmol/L Final      Potassium Potassium   Date Value Ref Range Status   12/08/2022 3.6 3.5 - 5.2 mmol/L Final   12/07/2022 3.7 3.5 - 5.2 mmol/L Final   12/06/2022 3.9 3.5 - 5.2 mmol/L Final      Chloride Chloride   Date Value Ref Range Status   12/08/2022 106 98 - 107 mmol/L Final   12/07/2022 110 (H) 98 - 107 mmol/L Final   12/06/2022 113 (H) 98 - 107 mmol/L Final      Bicarbonate No results found for: PLASMABICARB   BUN BUN   Date Value Ref Range Status   12/08/2022 20 8 - 23 mg/dL Final   12/07/2022 24 (H) 8 - 23 mg/dL Final   12/06/2022 24 (H) 8 - 23 mg/dL Final      Creatinine Creatinine   Date Value Ref Range Status   12/08/2022 0.70 (L) 0.76 - 1.27 mg/dL Final   12/07/2022 0.77 0.76 - 1.27 mg/dL Final   12/06/2022 0.80 0.76 - 1.27 mg/dL Final      Calcium Calcium   Date Value Ref Range Status   12/08/2022 7.8 (L) 8.6 - 10.5 mg/dL Final   12/07/2022 7.8 (L) 8.6 - 10.5 mg/dL Final   12/06/2022 8.1 (L) 8.6 - 10.5 mg/dL Final      Magnesium No results found for: MG     Results from last 7 days   Lab Units 12/08/22  0521   WBC 10*3/mm3 7.64   HEMOGLOBIN g/dL 7.2*   HEMATOCRIT % 22.6*   PLATELETS 10*3/mm3 270     No results found for: TROPONINT         Echo EF Estimated  No results found for: ECHOEFEST      Assessment/ Plan  Assessment & Plan   Active Hospital Problems    Diagnosis  POA   • **Lumbar spinal stenosis [M48.061]  Yes   • Severe malnutrition (HCC) [E43]  Yes   • Spinal stenosis of lumbar region with neurogenic claudication [M48.062]  Yes   • Postoperative urinary retention [N99.89, R33.8]  No   • Postoperative ileus (HCC) [K91.89, K56.7]  No   • Leukocytosis  (leucocytosis) [D72.829]  No     1. Urinary retention post-op back surgery 11/23 for spinal stenosis  Urology foll  -h/o bph  fc removed     2.  postoperative ileus- now resolved  DHT with tube feeding  gen surgery foll     3. Hypertension with soft bp  - holding Coreg, Imdur, lisinopril     4. Hypernatremia/MESFIN  Renal foll- improving      5.  Metabolic encephalopathy  Neuro foll     6. Possible aspiration pneumonia vs pneumonitis  -XR showed LLL consolidation  -s/p  zosyn --primary foll  -MRSA screen negative     7. hyperlipidemia  Alt 103, ast 95- statin on hold     8. CAD    A.  stenting of the circumflex in 2016     B. Stenting of the LAD in March 2021     9.  Sinus tach     Plan  He is off DAPT however his stent was more than a year ago.   To rehab soon  We will follow-up as needed  We will restart Plavix    Andrade Guerrero MD  12/08/22  11:53 EST

## 2022-12-08 NOTE — PROGRESS NOTES
Cc: Ileus    S: Tolerating tube feeds, tolerating regular diet.  Having bowel function, no abdominal pain    O:   Vitals:    12/07/22 0500 12/07/22 0700 12/07/22 1206 12/07/22 1500   BP:  148/49 132/55 153/78   BP Location:  Right arm Left arm Right arm   Patient Position:  Lying Lying Lying   Pulse:  86  105   Resp:   18    Temp:  97.7 °F (36.5 °C) 98.3 °F (36.8 °C) 98.5 °F (36.9 °C)   TempSrc:  Oral Oral Oral   SpO2:  99%  100%   Weight: 55.5 kg (122 lb 5.7 oz)      Height:         Alert, no acute distress  Ill-appearing  Abdomen soft, nontender nondistended  Core track in place    LFTs improving  Normal white blood cell count  Hemoglobin 7.3    Assessment and plan    82-year-old male with postop ileus after spine surgery.  Tolerating tube feeds and regular diet.    Discontinue core track tube  Continue regular diet  Stool softeners and MiraLAX as needed    Will sign off, call if questions

## 2022-12-08 NOTE — DISCHARGE PLACEMENT REQUEST
"Boni Hernandez (82 y.o. Male)     Date of Birth   1940    Social Security Number       Address   [address] [place] [address]    Home Phone   [phone]    MRN   [id number]       Christianity   None    Marital Status                               Admission Date   11/23/22    Admission Type   Elective    Admitting Provider   Matthew Alex MD    Attending Provider   Matthew Alex MD    Department, Room/Bed   [address], [address]       Discharge Date       Discharge Disposition       Discharge Destination                               Attending Provider: Matthew Alex MD    Allergies: No Known Allergies    Isolation: None   Infection: None   Code Status: CPR    Ht: 170.2 cm (67\")   Wt: 56 kg (123 lb 7.3 oz)    Admission Cmt: None   Principal Problem: Lumbar spinal stenosis [M48.061]                 Active Insurance as of 11/23/2022     Primary Coverage     Payor Plan Insurance Group Employer/Plan Group    HUMANA MEDICARE REPLACEMENT HUMANA MEDICARE REPLACEMENT [id number]     Payor Plan Address Payor Plan Phone Number Payor Plan Fax Number Effective Dates    [address] [address] [phone]  1/1/2018 - None Entered    Former[facility] [id number]       Subscriber Name Subscriber Birth Date Member ID       BONI HERNANDEZ 1940 [id number]                 Emergency Contacts      (Rel.) Home Phone Work Phone Mobile Phone    ASAD HERNANDEZ (Spouse) [phone] -- [phone]            {Outbreak/Travel/Exposure Documentation......;  Question Available Choices Patient Response   COVID-19 Outbreak Screen:  Do you currently have a new onset of the following symptoms?        Fever/Chills, Cough, Shortness of air, Loss of taste or smell, No, Unknown  No (11/23/22 8840)   COVID-19 Outbreak Screen: In the last 14 days, have you had contact with anyone who is ill, has show any of the symptoms listed above and/or has been diagnosis with the 2019 Novel Coronavirus? This includes " any immediate household members but excludes any patients with whom you have been in contact within your normal work duties wearing proper PPE, if you are a healthcare worker.  Yes, No, Unknown              No (11/23/22 0740)   COVID-19 Outbreak Screen: Who was notified? Free text (not recorded)   Ebola Screening Outbreak Screen: Have you traveled to the [place] or [place] within the past 21 days?  Yes, No, Unknown No (11/23/22 0740)   Ebola Screening Outbreak Screen: Do you have ANY of the following symptoms: Fever/Chills, Vomiting, Diarrhea, Fatigue, Headache, Muscle pain, Unexplained bleeding, Abdominal (stomach) pain, No, Unknown (not recorded)   Ebola Screening Outbreak Screen: Name of Person notified Free text (not recorded)   Travel Screen: Have you traveled in the last month? If so, to what country have you traveled? If [place] what state? Yes, No, Unknown  List of all countries  List of all States No (11/23/22 0742)  (not recorded)  (not recorded)   Infection Risk: Do you currently have the following symptoms?  (If cough is selected, the Tuberculosis Screen is performed.) Cough, Fever, Rash, No No (11/23/22 0742)   Tuberculosis Screen: Do you have any of the following Tuberculosis Risks?  · Have you lived or spent time with anyone who had or may have TB?  · Have you lived in or visited any of the following areas for more than one month: [place], [place], [place], [place] or [place], the [place] or [place]?  · Do you have HIV/AIDS?  · Have you lived in or worked in a nursing home, homeless shelter, correctional facility, or substance abuse treatment facility?   · No    If Yes do you have any of the following symptoms? Yes responses display to the right    If Yes, symptoms listed are:  Cough greater than or equal to 3 weeks, Loss of appetite, Unexplained weight loss, Night sweats, Bloody sputum or hemoptysis, Hoarseness, Fever, Fatigue, Chest pain, No (not recorded)  (not  recorded)   Exposure Screen: Have you been exposed to any of these contagious diseases in the last month? Measles, Chickenpox, Meningitis, Pertussis, Whooping Cough, No No (11/23/22 9496)

## 2022-12-08 NOTE — CASE MANAGEMENT/SOCIAL WORK
Continued Stay Note  [facility]     Patient Name: Boni Hernandez  MRN: [id number]  Today's Date: 12/8/2022    Admit Date: 11/23/2022    Plan: SNF- referrals pending   Discharge Plan     Row Name 12/08/22 1341       Plan    Plan SNF- referrals pending    Patient/Family in Agreement with Plan yes    Plan Comments CCP met with patient's wife, Shikha, and family to discuss discharge planning. Shikha prefers rehab closer to home and first choice is [facility]. CCP mentioned back up SNF's already in Epic being [facility] [place] and [facility] agreeable if [facility] unable to accept. CCP spoke with Najma/Mal who is reviewing. Christina HERNANDEZ RN CCP               Discharge Codes    No documentation.               Expected Discharge Date and Time     Expected Discharge Date Expected Discharge Time    Dec 9, 2022             Christina Cai RN

## 2022-12-08 NOTE — PROGRESS NOTES
"Nutrition Services    Patient Name:  Boni Hernandez  YOB: 1940  MRN: [id number]  Admit Date:  11/23/2022      PROGRESS NOTE - CLINICAL NUTRITION    Comments: Follow up for MSA/TF's/po intake:    Encounter Information         Reason for Encounter Follow up for MSA/TF's/po intake    Current Issues Pt advanced to Soft to Chew Diet (chopped meat) with thins and tolerating well with % po intake of meals. TF's & cortrak d/c'd last night. SLP stated pt needs denture paste and once he has it, diet may be upgraded to Regular.   Bowel obstruction resolved, 1 BM 12/7, dulcolax suppository daily  Awaiting rehab placement.      Current Nutrition Orders & Evaluation of Intake       Oral Nutrition     Current PO Diet Diet: Regular/House Diet; Texture: Soft to Chew (NDD 3); Soft to Chew: Chopped Meat; Fluid Consistency: Thin (IDDSI 0)   Supplement n/a   PO Evaluation     % PO Intake % po intake    # of Days Evaluated 2    Factors Affecting Intake  altered GI function   --  Anthropometrics          Height    Weight Height: 170.2 cm (67\")  Weight: 56 kg (123 lb 7.3 oz) (12/08/22 0629)    BMI kg/m2 Body mass index is 19.34 kg/m².    Weight trend Gain, Amount/Timeframe: 3 lb wt gain over past week     Labs        Pertinent Labs Reviewed, listed below     Results from last 7 days   Lab Units 12/08/22 0521 12/07/22  0524 12/06/22  0555   SODIUM mmol/L 137 141 143   POTASSIUM mmol/L 3.6 3.7 3.9   CHLORIDE mmol/L 106 110* 113*   CO2 mmol/L 26.3 27.0 23.7   BUN mg/dL 20 24* 24*   CREATININE mg/dL 0.70* 0.77 0.80   CALCIUM mg/dL 7.8* 7.8* 8.1*   BILIRUBIN mg/dL <0.2 <0.2 0.2   ALK PHOS U/L 110 118* 124*   ALT (SGPT) U/L 77* 87* 103*   AST (SGOT) U/L 53* 59* 95*   GLUCOSE mg/dL 103* 125* 129*     Results from last 7 days   Lab Units 12/08/22  0521 12/05/22  0801 12/05/22  0643 12/04/22  0414 12/03/22  1159 12/02/22  0744   MAGNESIUM mg/dL  --   --  1.7  --  2.2 2.7*   PHOSPHORUS mg/dL  --   --  4.8*  --  2.5 2.4* "   HEMOGLOBIN g/dL 7.2*   < > 7.9*   < > 8.0* 9.0*   HEMATOCRIT % 22.6*   < > 29.4*   < > 25.0* 28.8*   WBC 10*3/mm3 7.64   < > 8.65   < > 6.45 9.26   TRIGLYCERIDES mg/dL  --   --   --   --  101  --    ALBUMIN g/dL 2.40*   < >  --    < > 2.50* 2.60*    < > = values in this interval not displayed.     Results from last 7 days   Lab Units 12/08/22  0521 12/07/22  0524 12/06/22  0555 12/05/22  0643 12/04/22  0414   PLATELETS 10*3/mm3 270 291 283 250 232     No results found for: COVID19  Lab Results   Component Value Date    HGBA1C 5.30 11/28/2022          Medications            Scheduled Medications bisacodyl, 10 mg, Rectal, Daily  clopidogrel, 75 mg, Oral, Daily  enoxaparin, 40 mg, Subcutaneous, Q24H  methylnaltrexone, 6 mg, Subcutaneous, Every Other Day  metoprolol tartrate, 12.5 mg, Oral, Q12H  multivitamin with minerals, 1 tablet, Oral, Daily  OLANZapine, 2.5 mg, Oral, Daily Before Supper  pantoprazole, 40 mg, Intravenous, Q AM  sodium chloride, 10 mL, Intravenous, Q12H  sodium chloride, 3 mL, Intravenous, Q12H  tamsulosin, 0.4 mg, Oral, Daily        Infusions      PRN Medications •  acetaminophen  •  nitroglycerin  •  OLANZapine  •  ondansetron **OR** ondansetron  •  polyethylene glycol  •  senna-docusate sodium  •  sodium chloride  •  sodium chloride  •  sodium chloride  •  sodium chloride     Physical Findings          Physical Appearance alert, oriented, room air   Oral/Mouth Cavity teeth missing   Edema  no edema   Gastrointestinal non-distended , last bowel movement:1 BM 12/8   Skin  skin intact, bruising, skin tear, surgical incision   Tubes/Drains none   NFPE See Malnutrition Severity Assessment   --  NUTRITION INTERVENTION / PLAN OF CARE  Intervention Goal         Intervention Goal(s) Maintain nutrition status, Meet estimated needs, Disease management/therapy, Maintain intake and Appropriate weight gain     Nutrition Intervention         RD Action Encourage intake, Follow Tx Progress and Care plan reviewed      Nutrition Prescription         Diet Prescription     Supplement Prescription n/a   EN/PN Prescription    New Prescription Ordered? No changes at this time   --  Monitor/Evaluation        Monitor Per protocol   Discharge Needs Pending clinical course   Education Will instruct as appropriate   --    RD to follow up per protocol.    Electronically signed by:  Radha Euceda RD  12/08/22 16:15 EST

## 2022-12-08 NOTE — PROGRESS NOTES
[facility] PROGRESS NOTE      CC: POD 15 bilateral lami, neurolysis of L4 nerve root with fusion and instrumentation for severe pain in the legs and back.      Subjective     Interval History: Reports doing well. No new radicular symptoms. Tolerating regular diet. Would like to go to a rehab facility closer to home. Denies fever, chills, HA.      Objective     Vital signs in last 24 hours:  Temp:  [97.9 °F (36.6 °C)-99.1 °F (37.3 °C)] 97.9 °F (36.6 °C)  Heart Rate:  [] 89  Resp:  [16-18] 16  BP: (139-153)/(58-78) 139/66    Intake/Output this shift:  I/O this shift:  In: 120 [P.O.:120]  Out: -     LABS:  Results from last 7 days   Lab Units 12/08/22  0521 12/07/22  0524 12/06/22  0555 12/05/22  0643   WBC 10*3/mm3 7.64 9.74 10.14 8.65   HEMOGLOBIN g/dL 7.2* 7.3* 8.2* 7.9*   HEMATOCRIT % 22.6* 23.7* 26.4* 29.4*   PLATELETS 10*3/mm3 270 291 283 250   MONOCYTES % %  --   --   --  1.0*     Results from last 7 days   Lab Units 12/08/22  0521 12/07/22  0524 12/06/22  0555   SODIUM mmol/L 137 141 143   POTASSIUM mmol/L 3.6 3.7 3.9   CHLORIDE mmol/L 106 110* 113*   CO2 mmol/L 26.3 27.0 23.7   BUN mg/dL 20 24* 24*   CREATININE mg/dL 0.70* 0.77 0.80   CALCIUM mg/dL 7.8* 7.8* 8.1*   BILIRUBIN mg/dL <0.2 <0.2 0.2   ALK PHOS U/L 110 118* 124*   ALT (SGPT) U/L 77* 87* 103*   AST (SGOT) U/L 53* 59* 95*   GLUCOSE mg/dL 103* 125* 129*       IMAGING STUDIES:  No radiology results for the last day      I personally viewed and interpreted the patient's chart.    Meds reviewed/changed: Yes      Physical Exam:    General:   Awake, alert, oriented x3. Speech clear with no aphasia  Back:    Dressing overlying incision C/D/I. Incision edges well approximated without erythema or drainage.     Motor:    5/5 bilateral DF/PF and iliopsoas strength. No fasciculations, rigidity, spasticity, or abnormal movements.  Reflexes:   No clonus  Sensation:   Normal to light touch bilateral LE  Station and Gait:             Deferred to PT  "due to recent surgery      Assessment & Plan     ASSESSMENT:      Lumbar spinal stenosis    Postoperative urinary retention    Postoperative ileus (HCC)    Leukocytosis (leucocytosis)    Spinal stenosis of lumbar region with neurogenic claudication    Severe malnutrition (HCC)      PLAN:   Continue diet  Continue MiraLAX and stool softeners PRN per general surgery recs  Continue mobility work  CCP for d/c placement      I discussed the patient's findings and my recommendations with patient and Dr. Alex.       LOS: 9 days       Christina Anderson PA-C  12/8/2022  12:32 EST    \"Dictated utilizing Dragon dictation\".      "

## 2022-12-08 NOTE — PROGRESS NOTES
[facility]    Chart reviewed. Noted patient/patient wife request for rehab closer to home. Referrals to subacute facilities noted. Currently, no beds available on acute rehab and none anticipated till mid next week. Will follow up for disposition.    Lyly Hernandez RN  Rehab Admissions Coordinator  [phone]

## 2022-12-08 NOTE — PLAN OF CARE
Problem: Adult Inpatient Plan of Care  Goal: Plan of Care Review  Outcome: Ongoing, Progressing   Goal Outcome Evaluation:  VSS. Alert and oriented x4 with intermittent confusion. Pt rested throughout the night with no issues or complaints.

## 2022-12-09 LAB
ABO GROUP BLD: NORMAL
ALBUMIN SERPL-MCNC: 2.5 G/DL (ref 3.5–5.2)
ALBUMIN/GLOB SERPL: 1 G/DL
ALP SERPL-CCNC: 115 U/L (ref 39–117)
ALT SERPL W P-5'-P-CCNC: 71 U/L (ref 1–41)
ANION GAP SERPL CALCULATED.3IONS-SCNC: 4 MMOL/L (ref 5–15)
AST SERPL-CCNC: 46 U/L (ref 1–40)
BILIRUB SERPL-MCNC: 0.2 MG/DL (ref 0–1.2)
BLD GP AB SCN SERPL QL: NEGATIVE
BUN SERPL-MCNC: 16 MG/DL (ref 8–23)
BUN/CREAT SERPL: 24.2 (ref 7–25)
CALCIUM SPEC-SCNC: 7.8 MG/DL (ref 8.6–10.5)
CHLORIDE SERPL-SCNC: 101 MMOL/L (ref 98–107)
CO2 SERPL-SCNC: 26 MMOL/L (ref 22–29)
CREAT SERPL-MCNC: 0.66 MG/DL (ref 0.76–1.27)
DEPRECATED RDW RBC AUTO: 43 FL (ref 37–54)
EGFRCR SERPLBLD CKD-EPI 2021: 93.6 ML/MIN/1.73
ERYTHROCYTE [DISTWIDTH] IN BLOOD BY AUTOMATED COUNT: 13.5 % (ref 12.3–15.4)
GLOBULIN UR ELPH-MCNC: 2.5 GM/DL
GLUCOSE BLDC GLUCOMTR-MCNC: 120 MG/DL (ref 70–130)
GLUCOSE SERPL-MCNC: 107 MG/DL (ref 65–99)
HCT VFR BLD AUTO: 23.3 % (ref 37.5–51)
HCT VFR BLD AUTO: 29.4 % (ref 37.5–51)
HGB BLD-MCNC: 7.5 G/DL (ref 13–17.7)
HGB BLD-MCNC: 9.4 G/DL (ref 13–17.7)
MCH RBC QN AUTO: 28.6 PG (ref 26.6–33)
MCHC RBC AUTO-ENTMCNC: 32.2 G/DL (ref 31.5–35.7)
MCV RBC AUTO: 88.9 FL (ref 79–97)
PLATELET # BLD AUTO: 286 10*3/MM3 (ref 140–450)
PMV BLD AUTO: 9.9 FL (ref 6–12)
POTASSIUM SERPL-SCNC: 3.7 MMOL/L (ref 3.5–5.2)
PROT SERPL-MCNC: 5 G/DL (ref 6–8.5)
RBC # BLD AUTO: 2.62 10*6/MM3 (ref 4.14–5.8)
RH BLD: POSITIVE
SODIUM SERPL-SCNC: 131 MMOL/L (ref 136–145)
T&S EXPIRATION DATE: NORMAL
WBC NRBC COR # BLD: 6.56 10*3/MM3 (ref 3.4–10.8)

## 2022-12-09 PROCEDURE — 85018 HEMOGLOBIN: CPT

## 2022-12-09 PROCEDURE — 86850 RBC ANTIBODY SCREEN: CPT

## 2022-12-09 PROCEDURE — 86900 BLOOD TYPING SEROLOGIC ABO: CPT

## 2022-12-09 PROCEDURE — 86901 BLOOD TYPING SEROLOGIC RH(D): CPT

## 2022-12-09 PROCEDURE — P9016 RBC LEUKOCYTES REDUCED: HCPCS

## 2022-12-09 PROCEDURE — 85027 COMPLETE CBC AUTOMATED: CPT | Performed by: NURSE PRACTITIONER

## 2022-12-09 PROCEDURE — 86923 COMPATIBILITY TEST ELECTRIC: CPT

## 2022-12-09 PROCEDURE — 99024 POSTOP FOLLOW-UP VISIT: CPT

## 2022-12-09 PROCEDURE — 80053 COMPREHEN METABOLIC PANEL: CPT | Performed by: HOSPITALIST

## 2022-12-09 PROCEDURE — 36430 TRANSFUSION BLD/BLD COMPNT: CPT

## 2022-12-09 PROCEDURE — 82962 GLUCOSE BLOOD TEST: CPT

## 2022-12-09 PROCEDURE — 85014 HEMATOCRIT: CPT

## 2022-12-09 PROCEDURE — 25010000002 ENOXAPARIN PER 10 MG: Performed by: NURSE PRACTITIONER

## 2022-12-09 PROCEDURE — 25010000002 METHYLNALTREXONE 12 MG/0.6ML SOLUTION: Performed by: SURGERY

## 2022-12-09 RX ADMIN — Medication 10 ML: at 09:33

## 2022-12-09 RX ADMIN — TAMSULOSIN HYDROCHLORIDE 0.4 MG: 0.4 CAPSULE ORAL at 09:33

## 2022-12-09 RX ADMIN — ENOXAPARIN SODIUM 40 MG: 100 INJECTION SUBCUTANEOUS at 17:53

## 2022-12-09 RX ADMIN — MULTIPLE VITAMINS W/ MINERALS TAB 1 TABLET: TAB at 09:33

## 2022-12-09 RX ADMIN — METOPROLOL TARTRATE 12.5 MG: 25 TABLET, FILM COATED ORAL at 22:23

## 2022-12-09 RX ADMIN — Medication 10 ML: at 22:25

## 2022-12-09 RX ADMIN — METHYLNALTREXONE BROMIDE 6 MG: 12 INJECTION, SOLUTION SUBCUTANEOUS at 09:32

## 2022-12-09 RX ADMIN — CLOPIDOGREL 75 MG: 75 TABLET, FILM COATED ORAL at 09:33

## 2022-12-09 RX ADMIN — PANTOPRAZOLE SODIUM 40 MG: 40 INJECTION, POWDER, FOR SOLUTION INTRAVENOUS at 05:56

## 2022-12-09 RX ADMIN — Medication 3 ML: at 22:27

## 2022-12-09 RX ADMIN — OLANZAPINE 2.5 MG: 2.5 TABLET ORAL at 17:53

## 2022-12-09 RX ADMIN — METOPROLOL TARTRATE 12.5 MG: 25 TABLET, FILM COATED ORAL at 09:33

## 2022-12-09 RX ADMIN — Medication 20 ML: at 22:26

## 2022-12-09 NOTE — SIGNIFICANT NOTE
12/09/22 1535   OTHER   Discipline occupational therapist   Rehab Time/Intention   Session Not Performed unable to treat, medical status change  (Spoke with RN, requesting to hold OT today. Pt awaiting dopplers to r/u RLE blood clot.)   Therapy Assessment/Plan (PT)   Criteria for Skilled Interventions Met (PT) yes   Recommendation   OT - Next Appointment 12/12/22

## 2022-12-09 NOTE — PROGRESS NOTES
[facility] PROGRESS NOTE      CC:POD 16 bilateral lami, neurolysis of L4 nerve root with fusion and instrumentation for severe pain in the legs and back.      Subjective     Interval History: Reports doing well. Admits to feeling fatigued. C/o diffuse bilateral leg pain, worst in the right calf.    ROS:  Constitutional: No fever, chills  GI: No nausea, vomiting  MS: No back pain  Neuro: No numbness, tingling, or weakness,  no balance difficulties  : No difficulty voiding, no incontinence    Objective     Vital signs in last 24 hours:  Temp:  [97.4 °F (36.3 °C)-98.3 °F (36.8 °C)] 98 °F (36.7 °C)  Heart Rate:  [80-92] 80  Resp:  [16-18] 17  BP: (110-148)/(49-65) 148/56    Intake/Output this shift:  No intake/output data recorded.    LABS:  Results from last 7 days   Lab Units 12/09/22  0556 12/08/22  0521 12/07/22  0524 12/06/22  0555 12/05/22  0643   WBC 10*3/mm3 6.56 7.64 9.74   < > 8.65   HEMOGLOBIN g/dL 7.5* 7.2* 7.3*   < > 7.9*   HEMATOCRIT % 23.3* 22.6* 23.7*   < > 29.4*   PLATELETS 10*3/mm3 286 270 291   < > 250   MONOCYTES % %  --   --   --   --  1.0*    < > = values in this interval not displayed.     Results from last 7 days   Lab Units 12/09/22  0556 12/08/22  0521 12/07/22 0524   SODIUM mmol/L 131* 137 141   POTASSIUM mmol/L 3.7 3.6 3.7   CHLORIDE mmol/L 101 106 110*   CO2 mmol/L 26.0 26.3 27.0   BUN mg/dL 16 20 24*   CREATININE mg/dL 0.66* 0.70* 0.77   CALCIUM mg/dL 7.8* 7.8* 7.8*   BILIRUBIN mg/dL 0.2 <0.2 <0.2   ALK PHOS U/L 115 110 118*   ALT (SGPT) U/L 71* 77* 87*   AST (SGOT) U/L 46* 53* 59*   GLUCOSE mg/dL 107* 103* 125*       IMAGING STUDIES:  No radiology results for the last day    I personally viewed and interpreted the patient's chart.    Meds reviewed/changed: Yes      Physical Exam:    General:   Awake, alert, oriented x3. Speech clear with no aphasia     Motor:    5/5 bilateral DF/PF and 4/5 bilateral iliopsoas strength. No fasciculations, rigidity, spasticity, or abnormal  "movements.  Reflexes:   No clonus  Sensation:   Normal to light touch bilateral LE  Station and Gait:             Deferred to PT due to recent surgery  Extremities:   Negative Lucrecia bilaterally. + R calf TTP.      Assessment & Plan     ASSESSMENT:      Lumbar spinal stenosis    Postoperative urinary retention    Postoperative ileus (HCC)    Leukocytosis (leucocytosis)    Spinal stenosis of lumbar region with neurogenic claudication    Severe malnutrition (HCC)      PLAN:   Bilateral LE dopplers pending  1 unit of pRBCs to be transfused  Rehab referrals pending  Cardiology, Surgery, Nephrology following. Appreciate input on this patient    I discussed the patient's findings and my recommendations with patient, nursing staff and Dr. Alex.       LOS: 10 days       Christina Anderson PA-C  12/9/2022  11:35 EST    \"Dictated utilizing Dragon dictation\".      "

## 2022-12-09 NOTE — CASE MANAGEMENT/SOCIAL WORK
Continued Stay Note  [facility]     Patient Name: Boni Hernandez  MRN: [id number]  Today's Date: 12/9/2022    Admit Date: 11/23/2022    Plan: SNF - referrals pending.   Discharge Plan     Row Name 12/09/22 1213       Plan    Plan SNF - referrals pending.    Plan Comments No beds currently at [facility].  CCP spoke w/ [facility] who states they are evaling - will need to see another PT note before making a decision.  Referral to [facility] is also still pending at this time. Will need precert for SNF.  Do not anticipate pt being ready for DC over the weekend.  CCP will followup on Monday. ........Halley KIRK/ DARYL               Discharge Codes    No documentation.               Expected Discharge Date and Time     Expected Discharge Date Expected Discharge Time    Dec 10, 2022             Halley Pickard RN

## 2022-12-09 NOTE — PROGRESS NOTES
[facility]  Continuing to follow for progress with therapy to see if pt is able to participate in/ tolerate 3 hours of therapy a day. Noted wife looking at rehab closer to home. Of note, no Acute Rehab beds available at this time- not anticipated until middle to end of next week at earliest.     Jessica East RN  Acute Rehab Admission Nurse

## 2022-12-10 ENCOUNTER — APPOINTMENT (OUTPATIENT)
Dept: CARDIOLOGY | Facility: HOSPITAL | Age: 82
End: 2022-12-10

## 2022-12-10 LAB
ALBUMIN SERPL-MCNC: 2.5 G/DL (ref 3.5–5.2)
ALBUMIN/GLOB SERPL: 0.9 G/DL
ALP SERPL-CCNC: 122 U/L (ref 39–117)
ALT SERPL W P-5'-P-CCNC: 64 U/L (ref 1–41)
ANION GAP SERPL CALCULATED.3IONS-SCNC: 5.9 MMOL/L (ref 5–15)
AST SERPL-CCNC: 41 U/L (ref 1–40)
BH BB BLOOD EXPIRATION DATE: NORMAL
BH BB BLOOD TYPE BARCODE: 5100
BH BB DISPENSE STATUS: NORMAL
BH BB PRODUCT CODE: NORMAL
BH BB UNIT NUMBER: NORMAL
BH CV LOWER VASCULAR LEFT COMMON FEMORAL AUGMENT: NORMAL
BH CV LOWER VASCULAR LEFT COMMON FEMORAL COMPETENT: NORMAL
BH CV LOWER VASCULAR LEFT COMMON FEMORAL COMPRESS: NORMAL
BH CV LOWER VASCULAR LEFT COMMON FEMORAL PHASIC: NORMAL
BH CV LOWER VASCULAR LEFT COMMON FEMORAL SPONT: NORMAL
BH CV LOWER VASCULAR LEFT DISTAL FEMORAL COMPRESS: NORMAL
BH CV LOWER VASCULAR LEFT GASTRONEMIUS COMPRESS: NORMAL
BH CV LOWER VASCULAR LEFT GREATER SAPH AK COMPRESS: NORMAL
BH CV LOWER VASCULAR LEFT GREATER SAPH BK COMPRESS: NORMAL
BH CV LOWER VASCULAR LEFT LESSER SAPH COMPRESS: NORMAL
BH CV LOWER VASCULAR LEFT MID FEMORAL AUGMENT: NORMAL
BH CV LOWER VASCULAR LEFT MID FEMORAL COMPETENT: NORMAL
BH CV LOWER VASCULAR LEFT MID FEMORAL COMPRESS: NORMAL
BH CV LOWER VASCULAR LEFT MID FEMORAL PHASIC: NORMAL
BH CV LOWER VASCULAR LEFT MID FEMORAL SPONT: NORMAL
BH CV LOWER VASCULAR LEFT PERONEAL COMPRESS: NORMAL
BH CV LOWER VASCULAR LEFT POPLITEAL AUGMENT: NORMAL
BH CV LOWER VASCULAR LEFT POPLITEAL COMPETENT: NORMAL
BH CV LOWER VASCULAR LEFT POPLITEAL COMPRESS: NORMAL
BH CV LOWER VASCULAR LEFT POPLITEAL PHASIC: NORMAL
BH CV LOWER VASCULAR LEFT POPLITEAL SPONT: NORMAL
BH CV LOWER VASCULAR LEFT POSTERIOR TIBIAL COMPRESS: NORMAL
BH CV LOWER VASCULAR LEFT PROFUNDA FEMORAL COMPRESS: NORMAL
BH CV LOWER VASCULAR LEFT PROXIMAL FEMORAL COMPRESS: NORMAL
BH CV LOWER VASCULAR LEFT SAPHENOFEMORAL JUNCTION COMPRESS: NORMAL
BH CV LOWER VASCULAR RIGHT COMMON FEMORAL AUGMENT: NORMAL
BH CV LOWER VASCULAR RIGHT COMMON FEMORAL COMPETENT: NORMAL
BH CV LOWER VASCULAR RIGHT COMMON FEMORAL COMPRESS: NORMAL
BH CV LOWER VASCULAR RIGHT COMMON FEMORAL PHASIC: NORMAL
BH CV LOWER VASCULAR RIGHT COMMON FEMORAL SPONT: NORMAL
BH CV LOWER VASCULAR RIGHT DISTAL FEMORAL COMPRESS: NORMAL
BH CV LOWER VASCULAR RIGHT GASTRONEMIUS COMPRESS: NORMAL
BH CV LOWER VASCULAR RIGHT GREATER SAPH AK COMPRESS: NORMAL
BH CV LOWER VASCULAR RIGHT GREATER SAPH BK COMPRESS: NORMAL
BH CV LOWER VASCULAR RIGHT LESSER SAPH COMPRESS: NORMAL
BH CV LOWER VASCULAR RIGHT MID FEMORAL AUGMENT: NORMAL
BH CV LOWER VASCULAR RIGHT MID FEMORAL COMPETENT: NORMAL
BH CV LOWER VASCULAR RIGHT MID FEMORAL COMPRESS: NORMAL
BH CV LOWER VASCULAR RIGHT MID FEMORAL PHASIC: NORMAL
BH CV LOWER VASCULAR RIGHT MID FEMORAL SPONT: NORMAL
BH CV LOWER VASCULAR RIGHT PERONEAL COMPRESS: NORMAL
BH CV LOWER VASCULAR RIGHT POPLITEAL AUGMENT: NORMAL
BH CV LOWER VASCULAR RIGHT POPLITEAL COMPETENT: NORMAL
BH CV LOWER VASCULAR RIGHT POPLITEAL COMPRESS: NORMAL
BH CV LOWER VASCULAR RIGHT POPLITEAL PHASIC: NORMAL
BH CV LOWER VASCULAR RIGHT POPLITEAL SPONT: NORMAL
BH CV LOWER VASCULAR RIGHT POSTERIOR TIBIAL COMPRESS: NORMAL
BH CV LOWER VASCULAR RIGHT PROFUNDA FEMORAL COMPRESS: NORMAL
BH CV LOWER VASCULAR RIGHT PROXIMAL FEMORAL COMPRESS: NORMAL
BH CV LOWER VASCULAR RIGHT SAPHENOFEMORAL JUNCTION COMPRESS: NORMAL
BILIRUB SERPL-MCNC: 0.3 MG/DL (ref 0–1.2)
BUN SERPL-MCNC: 11 MG/DL (ref 8–23)
BUN/CREAT SERPL: 16.4 (ref 7–25)
CALCIUM SPEC-SCNC: 8 MG/DL (ref 8.6–10.5)
CHLORIDE SERPL-SCNC: 103 MMOL/L (ref 98–107)
CO2 SERPL-SCNC: 23.1 MMOL/L (ref 22–29)
CREAT SERPL-MCNC: 0.67 MG/DL (ref 0.76–1.27)
CROSSMATCH INTERPRETATION: NORMAL
DEPRECATED RDW RBC AUTO: 42.9 FL (ref 37–54)
EGFRCR SERPLBLD CKD-EPI 2021: 93.2 ML/MIN/1.73
ERYTHROCYTE [DISTWIDTH] IN BLOOD BY AUTOMATED COUNT: 14.1 % (ref 12.3–15.4)
GLOBULIN UR ELPH-MCNC: 2.9 GM/DL
GLUCOSE BLDC GLUCOMTR-MCNC: 124 MG/DL (ref 70–130)
GLUCOSE SERPL-MCNC: 99 MG/DL (ref 65–99)
HCT VFR BLD AUTO: 27.8 % (ref 37.5–51)
HGB BLD-MCNC: 9.2 G/DL (ref 13–17.7)
MAXIMAL PREDICTED HEART RATE: 138 BPM
MCH RBC QN AUTO: 28.1 PG (ref 26.6–33)
MCHC RBC AUTO-ENTMCNC: 33.1 G/DL (ref 31.5–35.7)
MCV RBC AUTO: 85 FL (ref 79–97)
PLATELET # BLD AUTO: 322 10*3/MM3 (ref 140–450)
PMV BLD AUTO: 9.7 FL (ref 6–12)
POTASSIUM SERPL-SCNC: 4.3 MMOL/L (ref 3.5–5.2)
PROT SERPL-MCNC: 5.4 G/DL (ref 6–8.5)
RBC # BLD AUTO: 3.27 10*6/MM3 (ref 4.14–5.8)
SODIUM SERPL-SCNC: 132 MMOL/L (ref 136–145)
STRESS TARGET HR: 117 BPM
UNIT  ABO: NORMAL
UNIT  RH: NORMAL
WBC NRBC COR # BLD: 7.92 10*3/MM3 (ref 3.4–10.8)

## 2022-12-10 PROCEDURE — 93970 EXTREMITY STUDY: CPT

## 2022-12-10 PROCEDURE — 82962 GLUCOSE BLOOD TEST: CPT

## 2022-12-10 PROCEDURE — 25010000002 ENOXAPARIN PER 10 MG: Performed by: NURSE PRACTITIONER

## 2022-12-10 PROCEDURE — 99024 POSTOP FOLLOW-UP VISIT: CPT

## 2022-12-10 PROCEDURE — 97530 THERAPEUTIC ACTIVITIES: CPT

## 2022-12-10 RX ADMIN — TAMSULOSIN HYDROCHLORIDE 0.4 MG: 0.4 CAPSULE ORAL at 09:40

## 2022-12-10 RX ADMIN — CLOPIDOGREL 75 MG: 75 TABLET, FILM COATED ORAL at 09:42

## 2022-12-10 RX ADMIN — ENOXAPARIN SODIUM 40 MG: 100 INJECTION SUBCUTANEOUS at 17:07

## 2022-12-10 RX ADMIN — Medication 10 ML: at 09:40

## 2022-12-10 RX ADMIN — Medication 3 ML: at 20:10

## 2022-12-10 RX ADMIN — PANTOPRAZOLE SODIUM 40 MG: 40 INJECTION, POWDER, FOR SOLUTION INTRAVENOUS at 06:18

## 2022-12-10 RX ADMIN — METOPROLOL TARTRATE 12.5 MG: 25 TABLET, FILM COATED ORAL at 20:10

## 2022-12-10 RX ADMIN — MULTIPLE VITAMINS W/ MINERALS TAB 1 TABLET: TAB at 09:40

## 2022-12-10 RX ADMIN — OLANZAPINE 2.5 MG: 2.5 TABLET ORAL at 16:47

## 2022-12-10 RX ADMIN — ACETAMINOPHEN 1000 MG: 325 TABLET, FILM COATED ORAL at 13:13

## 2022-12-10 RX ADMIN — Medication 3 ML: at 09:40

## 2022-12-10 RX ADMIN — BISACODYL 10 MG: 10 SUPPOSITORY RECTAL at 09:40

## 2022-12-10 RX ADMIN — Medication 10 ML: at 20:10

## 2022-12-10 RX ADMIN — METOPROLOL TARTRATE 12.5 MG: 25 TABLET, FILM COATED ORAL at 09:39

## 2022-12-10 NOTE — PLAN OF CARE
Goal Outcome Evaluation:           Problem: Fall Injury Risk  Goal: Absence of Fall and Fall-Related Injury  Outcome: Ongoing, Progressing  Intervention: Identify and Manage Contributors  Recent Flowsheet Documentation  Taken 12/10/2022 0600 by Heather Melendez RN  Medication Review/Management: medications reviewed  Taken 12/10/2022 0500 by Heather Melendez RN  Medication Review/Management: medications reviewed  Taken 12/10/2022 0400 by Heather Melendez RN  Medication Review/Management: medications reviewed  Taken 12/10/2022 0000 by Heather Melendez RN  Medication Review/Management: medications reviewed  Taken 12/9/2022 2200 by Heather Melendez RN  Medication Review/Management: medications reviewed  Taken 12/9/2022 2000 by Heather Melendez RN  Medication Review/Management: medications reviewed  Intervention: Promote Injury-Free Environment  Recent Flowsheet Documentation  Taken 12/10/2022 0600 by Heather Melendez RN  Safety Promotion/Fall Prevention:   toileting scheduled   safety round/check completed   room organization consistent   activity supervised   clutter free environment maintained  Taken 12/10/2022 0400 by Heather Melendez RN  Safety Promotion/Fall Prevention:   assistive device/personal items within reach   activity supervised   clutter free environment maintained   room organization consistent   safety round/check completed   toileting scheduled  Taken 12/10/2022 0200 by Heather Melendez RN  Safety Promotion/Fall Prevention:   activity supervised   safety round/check completed   room organization consistent   lighting adjusted   toileting scheduled   clutter free environment maintained  Taken 12/10/2022 0000 by Heather Melendez RN  Safety Promotion/Fall Prevention:   toileting scheduled   safety round/check completed   room organization consistent   activity supervised   clutter free environment maintained  Taken 12/9/2022 2200 by Heather Melendez RN  Safety Promotion/Fall Prevention:   activity supervised   toileting  scheduled   room organization consistent   safety round/check completed   lighting adjusted   clutter free environment maintained  Taken 12/9/2022 2000 by Heather Melendez RN  Safety Promotion/Fall Prevention:   activity supervised   clutter free environment maintained   lighting adjusted     Problem: Bleeding (Spinal Surgery)  Goal: Absence of Bleeding  Outcome: Ongoing, Progressing     Problem: Pain (Spinal Surgery)  Goal: Acceptable Pain Control  Outcome: Ongoing, Progressing  Intervention: Prevent or Manage Pain  Recent Flowsheet Documentation  Taken 12/9/2022 2000 by Heather Melendez RN  Diversional Activities: television Pt had no complaints of pain or discomfort.

## 2022-12-10 NOTE — PLAN OF CARE
Goal Outcome Evaluation:  Plan of Care Reviewed With: patient        Progress: no change  Outcome Evaluation: Pt seen by PT today for treatment. Pt sat up to EOB w/ cues for seq and mod A. Pt transferred B>C req max A w/ PTAs B hands on pt 's gt belt and pt holding this PTAs B UE. No sequencing noted for B LE w/ pt stating he was unable to move them and stand pivot transfer performed. Pt performed B LE ther ex in chair for strengthening and to incr endurance. Because of pt's low activity tolerance, Pt may benefit from SNF after dc to improve strength, mobility and endurance.    Patient was not wearing a face mask during this therapy encounter. Therapist used appropriate personal protective equipment including mask and gloves.  Mask used was standard procedure mask. Appropriate PPE was worn during the entire therapy session. Hand hygiene was completed before and after therapy session. Patient is not in enhanced droplet precautions.

## 2022-12-10 NOTE — PROGRESS NOTES
[facility]  Continuing to follow for progress with therapy to see if pt is able to participate in/ tolerate 3 hours of therapy a day. Noted wife looking at rehab closer to home. Also noted awaiting Dopplers for calf pain.     Of note, no Acute Rehab beds available at this time- not anticipated until middle to end of next week at earliest.      Jessica East RN  Acute Rehab Admission Nurse

## 2022-12-10 NOTE — PROGRESS NOTES
[facility] NEUROSURGERY PROGRESS NOTE      CC:POD 17 bilateral lami, neurolysis of L4 nerve root with fusion and instrumentation for severe pain in the legs and back.      Subjective     Interval History: Reports doing well.  States that he feels less fatigued/lightheaded since receiving the blood transfusion yesterday.  Continues to complain of diffuse bilateral leg pain, worst in the right calf.  States that his leg pain is unchanged from yesterday.    ROS:  Constitutional: No fever, chills  GI: No nausea, vomiting  MS: No back pain  Neuro: No numbness, tingling, or weakness,  no balance difficulties  : No difficulty voiding, no incontinence    Objective     Vital signs in last 24 hours:  Temp:  [98 °F (36.7 °C)-98.7 °F (37.1 °C)] 98 °F (36.7 °C)  Heart Rate:  [82-92] 85  Resp:  [16-17] 16  BP: (122-153)/(48-81) 140/54    Intake/Output this shift:  No intake/output data recorded.    LABS:  Results from last 7 days   Lab Units 12/09/22  2246 12/09/22  0556 12/08/22  0521 12/06/22  0555 12/05/22  0643   WBC 10*3/mm3 7.92 6.56 7.64   < > 8.65   HEMOGLOBIN g/dL 9.2*  9.4* 7.5* 7.2*   < > 7.9*   HEMATOCRIT % 27.8*  29.4* 23.3* 22.6*   < > 29.4*   PLATELETS 10*3/mm3 322 286 270   < > 250   MONOCYTES % %  --   --   --   --  1.0*    < > = values in this interval not displayed.     Results from last 7 days   Lab Units 12/09/22  2347 12/09/22  0556 12/08/22  0521   SODIUM mmol/L 132* 131* 137   POTASSIUM mmol/L 4.3 3.7 3.6   CHLORIDE mmol/L 103 101 106   CO2 mmol/L 23.1 26.0 26.3   BUN mg/dL 11 16 20   CREATININE mg/dL 0.67* 0.66* 0.70*   CALCIUM mg/dL 8.0* 7.8* 7.8*   BILIRUBIN mg/dL 0.3 0.2 <0.2   ALK PHOS U/L 122* 115 110   ALT (SGPT) U/L 64* 71* 77*   AST (SGOT) U/L 41* 46* 53*   GLUCOSE mg/dL 99 107* 103*       IMAGING STUDIES:  No radiology results for the last day      I personally viewed and interpreted the patient's chart.    Meds reviewed/changed: Yes      Physical Exam:    General:   Awake, alert, oriented x3.  "Speech clear with no aphasia    Motor:    5/5 bilateral DF/PF and iliopsoas strength. No fasciculations, rigidity, spasticity, or abnormal movements.  Reflexes:   No clonus  Sensation:   Normal to light touch bilateral LE  Station and Gait:             Deferred to PT due to recent surgery  Extremities:   Wearing SCD. Negative Lucrecia bilaterally. No calf TTP bilaterally.      Assessment & Plan     ASSESSMENT:      Lumbar spinal stenosis    Postoperative urinary retention    Postoperative ileus (HCC)    Leukocytosis (leucocytosis)    Spinal stenosis of lumbar region with neurogenic claudication    Severe malnutrition (HCC)      PLAN:   Dopplers complete but read pending  Rehab referrals pending  Continue mobility work, work towards rehab    I discussed the patient's findings and my recommendations with patient and Dr. Altman.       LOS: 11 days       Christina Anderson PA-C  12/10/2022  08:52 EST    \"Dictated utilizing Dragon dictation\".      "

## 2022-12-10 NOTE — THERAPY TREATMENT NOTE
Patient Name: Boni Hernandez  : 1940    MRN: [id number]                              Today's Date: 12/10/2022       Admit Date: 2022    Visit Dx:     ICD-10-CM ICD-9-CM   1. Spinal stenosis of lumbar region with neurogenic claudication  M48.062 724.03     Patient Active Problem List   Diagnosis   • Lumbar spinal stenosis   • DDD (degenerative disc disease), lumbar   • Spondylolisthesis of lumbar region   • Hyperlipidemia   • HTN (hypertension)   • Coronary artery disease   • BPH (benign prostatic hyperplasia)   • GERD (gastroesophageal reflux disease)   • Constipation   • Postoperative urinary retention   • Postoperative ileus (HCC)   • Leukocytosis (leucocytosis)   • Spinal stenosis of lumbar region with neurogenic claudication   • Severe malnutrition (HCC)     Past Medical History:   Diagnosis Date   • BPH (benign prostatic hyperplasia)    • Cataract    • Coronary artery disease    • DDD (degenerative disc disease), lumbar    • GERD (gastroesophageal reflux disease)    • History of MI (myocardial infarction)     APPROX. 2 YEARS AGO, HAS STENT, FOLLOWED BY DR LOREDO, 10/21/19 STRESS TEST AT [facility]   • [place] (hard of hearing)    • HTN (hypertension)    • Hyperlipidemia    • Injury of back    • Low back pain     RADIATES DOWN BOTH LEGS   • Spinal stenosis      Past Surgical History:   Procedure Laterality Date   • CAROTID ENDARTERECTOMY Left 2017   • CATARACT EXTRACTION WITH INTRAOCULAR LENS IMPLANT Bilateral    • COLONOSCOPY     • CORONARY ANGIOPLASTY WITH STENT PLACEMENT  2016   • DENTAL PROCEDURE      DENTAL IMPLANTS   • EPIDURAL BLOCK     • LUMBAR DISCECTOMY FUSION INSTRUMENTATION N/A 2019    Procedure: Lumbar 4 to lumbar 5 laminectomy with a posterior lateral fusion and instrumentation;  Surgeon: Matthew Alex MD;  Location: [facility];  Service: Neurosurgery   • LUMBAR FUSION N/A 2022    Procedure: Lumbar 3 to lumbar 4 laminectomy with fusion and instrumentation and removal of  previous instrumentation at lumbar 4 to lumbar 5;  Surgeon: Matthew Alex MD;  Location: [facility] OR;  Service: Robotics - Neuro;  Laterality: N/A;   • OTHER SURGICAL HISTORY  2015    PT STATES HE HAS HAD STENTS PUT IN BOTH LEGS.    • TONSILLECTOMY        General Information     Row Name 12/10/22 1300          Physical Therapy Time and Intention    Document Type therapy note (daily note)  -     Mode of Treatment physical therapy  -     Row Name 12/10/22 1300          General Information    Existing Precautions/Restrictions fall;spinal  -     Row Name 12/10/22 1300          Cognition    Orientation Status (Cognition) oriented to;person;situation;place  -     Row Name 12/10/22 1300          Safety Issues, Functional Mobility    Safety Issues Affecting Function (Mobility) awareness of need for assistance;problem-solving;sequencing abilities  -     Impairments Affecting Function (Mobility) balance;endurance/activity tolerance;strength;postural/trunk control;range of motion (ROM)  -     Comment, Safety Issues/Impairments (Mobility) gt belt and non skid socks donned;  -           User Key  (r) = Recorded By, (t) = Taken By, (c) = Cosigned By    Initials Name Provider Type     Miri Khan PTA Physical Therapist Assistant               Mobility     Row Name 12/10/22 1301          Bed Mobility    Bed Mobility supine-sit;sidelying-sit  -     Supine-Sit [place] (Bed Mobility) minimum assist (75% patient effort);verbal cues;nonverbal cues (demo/gesture);moderate assist (50% patient effort)  -     Sidelying-Sit [place] (Bed Mobility) moderate assist (50% patient effort);verbal cues;nonverbal cues (demo/gesture)  -     Assistive Device (Bed Mobility) bed rails;head of bed elevated  -     Comment, (Bed Mobility) cues for sequencing; pt req mod A for sidelying to sit; CGA at EOB w/ pt reporting weakness and fear of falling  -     Row Name 12/10/22 1301          Bed-Chair  "Transfer    Bed-Chair Davidson (Transfers) maximum assist (25% patient effort);verbal cues;nonverbal cues (demo/gesture)  -PH     Comment, (Bed-Chair Transfer) PTAs B hands on pt gt belt w/ pt performing SPT. no B LE sequencing noted w/ pt reporting B LE pain; pt req max A to scoot back into chair  -PH     Row Name 12/10/22 1301          Gait/Stairs (Locomotion)    Davidson Level (Gait) unable to assess  -PH     Davidson Level (Stairs) unable to assess  -PH           User Key  (r) = Recorded By, (t) = Taken By, (c) = Cosigned By    Initials Name Provider Type     Miri Khan PTA Physical Therapist Assistant               Obj/Interventions     Row Name 12/10/22 1303          Motor Skills    Therapeutic Exercise other (see comments)  BAP, HS, hip abd, SAQ, SLR w/ AAROM; x 10 reps all  -PH     Row Name 12/10/22 1303          Balance    Balance Assessment sitting static balance;standing static balance  -PH     Static Sitting Balance contact guard  -PH     Comment, Balance Pt reported that he felt weak and as though he was going to \"fall off the bed\" so provided CGA w/ no LOB occurring.  -PH           User Key  (r) = Recorded By, (t) = Taken By, (c) = Cosigned By    Initials Name Provider Type     Miri Khan PTA Physical Therapist Assistant               Goals/Plan    No documentation.                Clinical Impression     Row Name 12/10/22 1304          Pain    Posttreatment Pain Rating 6/10  -PH     Pain Location lower  -PH     Pre/Posttreatment Pain Comment pt reporting B LE pain and LBP which he states is chronic  -PH     Pain Intervention(s) Repositioned;Ambulation/increased activity;Rest  -PH     Row Name 12/10/22 1308          Plan of Care Review    Plan of Care Reviewed With patient  -PH     Progress no change  -PH     Outcome Evaluation Pt seen by PT today for treatment. Pt sat up to EOB w/ cues for seq and mod A. Pt transferred B>C req max A w/ PTAs B hands on pt 's gt " belt and pt holding this PTAs B UE. No sequencing noted for B LE w/ pt stating he was unable to move them and stand pivot transfer performed. Pt performed B LE ther ex in chair for strengthening and to incr endurance. Because of pt's low activity tolerance, Pt may benefit from SNF after dc to improve strength, mobility and endurance.  -PH     Row Name 12/10/22 1303          Positioning and Restraints    Pre-Treatment Position in bed  -PH     Post Treatment Position chair  -PH     In Chair reclined;call light within reach;encouraged to call for assist;exit alarm on;notified nsg  -PH           User Key  (r) = Recorded By, (t) = Taken By, (c) = Cosigned By    Initials Name Provider Type     Miri Khan PTA Physical Therapist Assistant               Outcome Measures     Row Name 12/10/22 4958          How much help from another person do you currently need...    Turning from your back to your side while in flat bed without using bedrails? 2  -PH     Moving from lying on back to sitting on the side of a flat bed without bedrails? 2  -PH     Moving to and from a bed to a chair (including a wheelchair)? 2  -PH     Standing up from a chair using your arms (e.g., wheelchair, bedside chair)? 2  -PH     Climbing 3-5 steps with a railing? 1  -PH     To walk in hospital room? 1  -PH     AM-PAC 6 Clicks Score (PT) 10  -PH     Highest level of mobility 4 --> Transferred to chair/commode  -PH     Row Name 12/10/22 1309          Functional Assessment    Outcome Measure Options AM-PAC 6 Clicks Basic Mobility (PT)  -PH           User Key  (r) = Recorded By, (t) = Taken By, (c) = Cosigned By    Initials Name Provider Type    Miri Reynoso PTA Physical Therapist Assistant                             Physical Therapy Education     Title: PT OT SLP Therapies (Done)     Topic: Physical Therapy (Done)     Point: Mobility training (Done)     Learning Progress Summary           Patient Acceptance, E,D, VU,NR by   at 12/10/2022 1309    Acceptance, E,TB,D, VU,NR by BH at 12/6/2022 1704    Acceptance, E, NR by EM at 12/2/2022 1346    Acceptance, E, NR by EM at 11/30/2022 1629    Acceptance, E,D, NR,NL by PH at 11/29/2022 1548    Acceptance, E,TB, VU,DU by LB at 11/27/2022 1400    Acceptance, E,TB,D, VU,DU by LB at 11/26/2022 1245    Acceptance, E,D, VU,NR by MS at 11/25/2022 1156    Acceptance, E,TB, VU,DU by CALI at 11/24/2022 1135                   Point: Home exercise program (Done)     Learning Progress Summary           Patient Acceptance, E,D, VU,NR by  at 12/10/2022 1309    Acceptance, E,TB,D, VU,NR by  at 12/6/2022 1704    Acceptance, E,D, NR,NL by PH at 11/29/2022 1548    Acceptance, E,TB, VU,DU by LB at 11/27/2022 1400    Acceptance, E,TB,D, VU,DU by LB at 11/26/2022 1245    Acceptance, E,D, VU,NR by MS at 11/25/2022 1156    Acceptance, E,TB, VU,DU by CALI at 11/24/2022 1135                   Point: Body mechanics (Done)     Learning Progress Summary           Patient Acceptance, E,D, VU,NR by PH at 12/10/2022 1309    Acceptance, E,TB,D, VU,NR by  at 12/6/2022 1704    Acceptance, E,D, NR,NL by PH at 11/29/2022 1548    Acceptance, E,TB, VU,DU by LB at 11/27/2022 1400    Acceptance, E,TB,D, VU,DU by LB at 11/26/2022 1245    Acceptance, E,D, VU,NR by MS at 11/25/2022 1156    Acceptance, E,TB, VU,DU by CALI at 11/24/2022 1135                   Point: Precautions (Done)     Learning Progress Summary           Patient Acceptance, E,D, VU,NR by PH at 12/10/2022 1309    Acceptance, E,TB,D, VU,NR by BH at 12/6/2022 1704    Acceptance, E,D, NR,NL by PH at 11/29/2022 1548    Acceptance, E,TB, VU,DU by LB at 11/27/2022 1400    Acceptance, E,TB,D, VU,DU by LB at 11/26/2022 1245    Acceptance, E,D, VU,NR by MS at 11/25/2022 1156    Acceptance, E,TB, VU,DU by CALI at 11/24/2022 1135                               User Key     Initials Effective Dates Name Provider Type Discipline    EM 06/16/21 -  Virgie Lynch, PT Physical  Therapist PT    MS 06/16/21 -  Martinez Solares, PT Physical Therapist PT    CALI 05/19/21 -  Merlene Haines, PT Physical Therapist PT    LB 08/09/20 -  Leticia Cadet, PT Physical Therapist PT    PH 06/16/21 -  Miri Khan PTA Physical Therapist Assistant PT     04/08/22 -  Dejah Salazar, PT Physical Therapist PT              PT Recommendation and Plan     Plan of Care Reviewed With: patient  Progress: no change  Outcome Evaluation: Pt seen by PT today for treatment. Pt sat up to EOB w/ cues for seq and mod A. Pt transferred B>C req max A w/ PTAs B hands on pt 's gt belt and pt holding this PTAs B UE. No sequencing noted for B LE w/ pt stating he was unable to move them and stand pivot transfer performed. Pt performed B LE ther ex in chair for strengthening and to incr endurance. Because of pt's low activity tolerance, Pt may benefit from SNF after dc to improve strength, mobility and endurance.     Time Calculation:    PT Charges     Row Name 12/10/22 1309             Time Calculation    Start Time 1238  -PH      Stop Time 1252  -PH      Time Calculation (min) 14 min  -PH      PT Received On 12/10/22  -PH      PT - Next Appointment 12/12/22  -PH         Timed Charges    48170 - PT Therapeutic Exercise Minutes 6  -PH      33322 - PT Therapeutic Activity Minutes 8  -PH         Total Minutes    Timed Charges Total Minutes 14  -PH       Total Minutes 14  -PH            User Key  (r) = Recorded By, (t) = Taken By, (c) = Cosigned By    Initials Name Provider Type    PH Miri Khan PTA Physical Therapist Assistant              Therapy Charges for Today     Code Description Service Date Service Provider Modifiers Qty    [id number]  PT THERAPEUTIC ACT EA 15 MIN 12/10/2022 Miri Khan PTA GP 1          PT G-Codes  Outcome Measure Options: AM-PAC 6 Clicks Basic Mobility (PT)  AM-PAC 6 Clicks Score (PT): 10  AM-PAC 6 Clicks Score (OT): 15  PT Discharge Summary  Anticipated  Discharge Disposition (PT): skilled nursing facility    Miri Khan, PTA  12/10/2022

## 2022-12-10 NOTE — PLAN OF CARE
Problem: Adult Inpatient Plan of Care  Goal: Plan of Care Review  Outcome: Ongoing, Progressing   Goal Outcome Evaluation:   Patient up to chair today and did well. Tylenol x1 dose related to generalized pain while sitting up in the chair.  Doppler done today and results pending.  Purewick in place.  Double-lumen PICC in place to upper right arm.  Flushes well with good blood return noted.  Dressing looks good.  Room air.  No s/s of distress noted at this time.  Call light is within reach.

## 2022-12-11 LAB
ALBUMIN SERPL-MCNC: 2.8 G/DL (ref 3.5–5.2)
ALBUMIN/GLOB SERPL: 1 G/DL
ALP SERPL-CCNC: 128 U/L (ref 39–117)
ALT SERPL W P-5'-P-CCNC: 67 U/L (ref 1–41)
ANION GAP SERPL CALCULATED.3IONS-SCNC: 9.6 MMOL/L (ref 5–15)
AST SERPL-CCNC: 48 U/L (ref 1–40)
BILIRUB SERPL-MCNC: 0.3 MG/DL (ref 0–1.2)
BUN SERPL-MCNC: 11 MG/DL (ref 8–23)
BUN/CREAT SERPL: 17.7 (ref 7–25)
CALCIUM SPEC-SCNC: 8.3 MG/DL (ref 8.6–10.5)
CHLORIDE SERPL-SCNC: 100 MMOL/L (ref 98–107)
CO2 SERPL-SCNC: 25.4 MMOL/L (ref 22–29)
CREAT SERPL-MCNC: 0.62 MG/DL (ref 0.76–1.27)
DEPRECATED RDW RBC AUTO: 46.4 FL (ref 37–54)
EGFRCR SERPLBLD CKD-EPI 2021: 95.4 ML/MIN/1.73
ERYTHROCYTE [DISTWIDTH] IN BLOOD BY AUTOMATED COUNT: 14.5 % (ref 12.3–15.4)
GLOBULIN UR ELPH-MCNC: 2.8 GM/DL
GLUCOSE SERPL-MCNC: 115 MG/DL (ref 65–99)
HCT VFR BLD AUTO: 30.3 % (ref 37.5–51)
HGB BLD-MCNC: 9.8 G/DL (ref 13–17.7)
MCH RBC QN AUTO: 28.5 PG (ref 26.6–33)
MCHC RBC AUTO-ENTMCNC: 32.3 G/DL (ref 31.5–35.7)
MCV RBC AUTO: 88.1 FL (ref 79–97)
PLATELET # BLD AUTO: 340 10*3/MM3 (ref 140–450)
PMV BLD AUTO: 9.3 FL (ref 6–12)
POTASSIUM SERPL-SCNC: 4.1 MMOL/L (ref 3.5–5.2)
PROT SERPL-MCNC: 5.6 G/DL (ref 6–8.5)
RBC # BLD AUTO: 3.44 10*6/MM3 (ref 4.14–5.8)
SODIUM SERPL-SCNC: 135 MMOL/L (ref 136–145)
WBC NRBC COR # BLD: 8.14 10*3/MM3 (ref 3.4–10.8)

## 2022-12-11 PROCEDURE — 80053 COMPREHEN METABOLIC PANEL: CPT | Performed by: HOSPITALIST

## 2022-12-11 PROCEDURE — 25010000002 ENOXAPARIN PER 10 MG: Performed by: NURSE PRACTITIONER

## 2022-12-11 PROCEDURE — 99024 POSTOP FOLLOW-UP VISIT: CPT

## 2022-12-11 PROCEDURE — 25010000002 METHYLNALTREXONE 12 MG/0.6ML SOLUTION: Performed by: SURGERY

## 2022-12-11 PROCEDURE — 85027 COMPLETE CBC AUTOMATED: CPT | Performed by: NURSE PRACTITIONER

## 2022-12-11 RX ADMIN — CLOPIDOGREL 75 MG: 75 TABLET, FILM COATED ORAL at 09:10

## 2022-12-11 RX ADMIN — METHYLNALTREXONE BROMIDE 6 MG: 12 INJECTION, SOLUTION SUBCUTANEOUS at 09:10

## 2022-12-11 RX ADMIN — METOPROLOL TARTRATE 12.5 MG: 25 TABLET, FILM COATED ORAL at 09:10

## 2022-12-11 RX ADMIN — METOPROLOL TARTRATE 12.5 MG: 25 TABLET, FILM COATED ORAL at 22:14

## 2022-12-11 RX ADMIN — ENOXAPARIN SODIUM 40 MG: 100 INJECTION SUBCUTANEOUS at 17:18

## 2022-12-11 RX ADMIN — OLANZAPINE 2.5 MG: 2.5 TABLET ORAL at 17:18

## 2022-12-11 RX ADMIN — PANTOPRAZOLE SODIUM 40 MG: 40 INJECTION, POWDER, FOR SOLUTION INTRAVENOUS at 05:00

## 2022-12-11 RX ADMIN — TAMSULOSIN HYDROCHLORIDE 0.4 MG: 0.4 CAPSULE ORAL at 09:10

## 2022-12-11 RX ADMIN — Medication 10 ML: at 09:10

## 2022-12-11 RX ADMIN — Medication 3 ML: at 09:11

## 2022-12-11 RX ADMIN — ACETAMINOPHEN 1000 MG: 325 TABLET, FILM COATED ORAL at 13:26

## 2022-12-11 RX ADMIN — Medication 10 ML: at 22:14

## 2022-12-11 RX ADMIN — MULTIPLE VITAMINS W/ MINERALS TAB 1 TABLET: TAB at 09:10

## 2022-12-11 RX ADMIN — Medication 3 ML: at 22:15

## 2022-12-11 NOTE — PROGRESS NOTES
[facility]  Continuing to follow for progress with therapy to see if pt is able to participate in/ tolerate 3 hours of therapy a day. Able to transfer yesterday with maximal assistance in therapy but no ambulation. Noted wife looking at rehab closer to home.       Of note, no Acute Rehab beds available at this time- not anticipated until middle to end of next week at earliest.      Jessica East RN  Acute Rehab Admission Nurse

## 2022-12-11 NOTE — PLAN OF CARE
Problem: Adult Inpatient Plan of Care  Goal: Plan of Care Review  Outcome: Ongoing, Progressing  Flowsheets (Taken 12/11/2022 1608)  Progress: improving  Plan of Care Reviewed With: patient  Outcome Evaluation: vss, no falls, c/o pain prn tylenol, turn q2, continue to monitor  Goal: Patient-Specific Goal (Individualized)  Outcome: Ongoing, Progressing  Goal: Absence of Hospital-Acquired Illness or Injury  Outcome: Ongoing, Progressing  Intervention: Identify and Manage Fall Risk  Recent Flowsheet Documentation  Taken 12/11/2022 1607 by Mary Tobar RN  Safety Promotion/Fall Prevention: safety round/check completed  Taken 12/11/2022 1418 by Mayr Tobar RN  Safety Promotion/Fall Prevention:   assistive device/personal items within reach   clutter free environment maintained   fall prevention program maintained   nonskid shoes/slippers when out of bed   room organization consistent   safety round/check completed  Taken 12/11/2022 1201 by Mary Tobar RN  Safety Promotion/Fall Prevention: safety round/check completed  Taken 12/11/2022 1018 by Mary Tobar RN  Safety Promotion/Fall Prevention:   activity supervised   assistive device/personal items within reach   clutter free environment maintained   fall prevention program maintained   nonskid shoes/slippers when out of bed   safety round/check completed  Taken 12/11/2022 0911 by Mary Tobar RN  Safety Promotion/Fall Prevention:   activity supervised   assistive device/personal items within reach   clutter free environment maintained   fall prevention program maintained   nonskid shoes/slippers when out of bed   safety round/check completed   room organization consistent  Taken 12/11/2022 0733 by Mary Tobar RN  Safety Promotion/Fall Prevention: safety round/check completed  Intervention: Prevent Skin Injury  Recent Flowsheet Documentation  Taken 12/11/2022 1607 by Mary Tobar RN  Body Position:   position changed  independently   left   turned  Taken 12/11/2022 1418 by Mary Tobar RN  Body Position:   position changed independently   supine  Skin Protection: skin sealant/moisture barrier applied  Taken 12/11/2022 1201 by Mary Tobar RN  Body Position:   left   turned   position changed independently  Taken 12/11/2022 0911 by Mary Tobar RN  Body Position: supine, legs elevated  Skin Protection: skin sealant/moisture barrier applied  Intervention: Prevent and Manage VTE (Venous Thromboembolism) Risk  Recent Flowsheet Documentation  Taken 12/11/2022 1418 by Mary Tobar RN  Activity Management: activity adjusted per tolerance  Taken 12/11/2022 1018 by Mary Tobar RN  Activity Management: activity adjusted per tolerance  Taken 12/11/2022 0911 by Mary Tobar RN  Activity Management: activity adjusted per tolerance  Goal: Optimal Comfort and Wellbeing  Outcome: Ongoing, Progressing  Intervention: Monitor Pain and Promote Comfort  Recent Flowsheet Documentation  Taken 12/11/2022 0911 by Mary Tobar RN  Pain Management Interventions:   position adjusted   pillow support provided   medication offered but refused  Goal: Readiness for Transition of Care  Outcome: Ongoing, Progressing     Problem: Fall Injury Risk  Goal: Absence of Fall and Fall-Related Injury  Outcome: Ongoing, Progressing  Intervention: Promote Injury-Free Environment  Recent Flowsheet Documentation  Taken 12/11/2022 1607 by Mary Tobar RN  Safety Promotion/Fall Prevention: safety round/check completed  Taken 12/11/2022 1418 by Mary Tobar RN  Safety Promotion/Fall Prevention:   assistive device/personal items within reach   clutter free environment maintained   fall prevention program maintained   nonskid shoes/slippers when out of bed   room organization consistent   safety round/check completed  Taken 12/11/2022 1201 by Mary Tobar RN  Safety Promotion/Fall Prevention: safety round/check  completed  Taken 12/11/2022 1018 by Mary Tobar RN  Safety Promotion/Fall Prevention:   activity supervised   assistive device/personal items within reach   clutter free environment maintained   fall prevention program maintained   nonskid shoes/slippers when out of bed   safety round/check completed  Taken 12/11/2022 0911 by Mary Tobar RN  Safety Promotion/Fall Prevention:   activity supervised   assistive device/personal items within reach   clutter free environment maintained   fall prevention program maintained   nonskid shoes/slippers when out of bed   safety round/check completed   room organization consistent  Taken 12/11/2022 0733 by Mary Tobar RN  Safety Promotion/Fall Prevention: safety round/check completed     Problem: Hypertension Comorbidity  Goal: Blood Pressure in Desired Range  Outcome: Ongoing, Progressing     Problem: Bleeding (Spinal Surgery)  Goal: Absence of Bleeding  Outcome: Ongoing, Progressing     Problem: Bowel Motility Impaired (Spinal Surgery)  Goal: Effective Bowel Elimination  Outcome: Ongoing, Progressing     Problem: Fluid and Electrolyte Imbalance (Spinal Surgery)  Goal: Fluid and Electrolyte Balance  Outcome: Ongoing, Progressing     Problem: Functional Ability Impaired (Spinal Surgery)  Goal: Optimal Functional Ability  Outcome: Ongoing, Progressing  Intervention: Optimize Functional Status  Recent Flowsheet Documentation  Taken 12/11/2022 1418 by Mary Tobar RN  Activity Management: activity adjusted per tolerance  Taken 12/11/2022 1018 by Mary Tobar RN  Activity Management: activity adjusted per tolerance  Taken 12/11/2022 0911 by Mary Tobar RN  Activity Management: activity adjusted per tolerance  Positioning/Transfer Devices:   pillows   in use     Problem: Infection (Spinal Surgery)  Goal: Absence of Infection Signs and Symptoms  Outcome: Ongoing, Progressing     Problem: Neurologic Impairment (Spinal Surgery)  Goal: Optimal  Neurologic Function  Outcome: Ongoing, Progressing  Intervention: Optimize Neurologic Function  Recent Flowsheet Documentation  Taken 12/11/2022 1607 by Mary Tobar RN  Body Position:   position changed independently   left   turned  Taken 12/11/2022 1418 by Mary Tobar RN  Body Position:   position changed independently   supine  Pressure Reduction Devices:   specialty bed utilized   pressure-redistributing mattress utilized  Taken 12/11/2022 1201 by Mary Tobar RN  Body Position:   left   turned   position changed independently  Taken 12/11/2022 0911 by Mary Tobar RN  Body Position: supine, legs elevated  Pressure Reduction Devices:   pressure-redistributing mattress utilized   specialty bed utilized     Problem: Ongoing Anesthesia Effects (Spinal Surgery)  Goal: Anesthesia/Sedation Recovery  Outcome: Ongoing, Progressing  Intervention: Optimize Anesthesia Recovery  Recent Flowsheet Documentation  Taken 12/11/2022 1607 by Mary Tobar RN  Safety Promotion/Fall Prevention: safety round/check completed  Taken 12/11/2022 1418 by Mary Tobar RN  Safety Promotion/Fall Prevention:   assistive device/personal items within reach   clutter free environment maintained   fall prevention program maintained   nonskid shoes/slippers when out of bed   room organization consistent   safety round/check completed  Taken 12/11/2022 1400 by Mary Tobar RN  Administration (IS): self-administered  Taken 12/11/2022 1201 by Mary Tobar RN  Safety Promotion/Fall Prevention: safety round/check completed  Taken 12/11/2022 1200 by Mary Tobar RN  Administration (IS): self-administered  Taken 12/11/2022 1018 by Mary Tobar RN  Safety Promotion/Fall Prevention:   activity supervised   assistive device/personal items within reach   clutter free environment maintained   fall prevention program maintained   nonskid shoes/slippers when out of bed   safety round/check  completed  Taken 12/11/2022 1000 by Mary Tobar RN  Administration (IS): (sleeping) other (see comments)  Taken 12/11/2022 0911 by Mary Tobar RN  Patient Tolerance (IS): good  Safety Promotion/Fall Prevention:   activity supervised   assistive device/personal items within reach   clutter free environment maintained   fall prevention program maintained   nonskid shoes/slippers when out of bed   safety round/check completed   room organization consistent  Level Incentive Spirometer (mL): 2500  Number of Repetitions (IS): 5  Taken 12/11/2022 0733 by Mary Tobar RN  Safety Promotion/Fall Prevention: safety round/check completed     Problem: Pain (Spinal Surgery)  Goal: Acceptable Pain Control  Outcome: Ongoing, Progressing  Intervention: Prevent or Manage Pain  Recent Flowsheet Documentation  Taken 12/11/2022 1418 by Mary Tobar RN  Diversional Activities: television  Taken 12/11/2022 0911 by Mary Tobar RN  Pain Management Interventions:   position adjusted   pillow support provided   medication offered but refused  Diversional Activities: television     Problem: Postoperative Nausea and Vomiting (Spinal Surgery)  Goal: Nausea and Vomiting Relief  Outcome: Ongoing, Progressing     Problem: Postoperative Urinary Retention (Spinal Surgery)  Goal: Effective Urinary Elimination  Outcome: Ongoing, Progressing     Problem: Respiratory Compromise (Spinal Surgery)  Goal: Effective Oxygenation and Ventilation  Outcome: Ongoing, Progressing  Intervention: Optimize Oxygenation and Ventilation  Recent Flowsheet Documentation  Taken 12/11/2022 0911 by Mary Tobar RN  Head of Bed (HOB) Positioning: HOB at 20-30 degrees     Problem: Skin Injury Risk Increased  Goal: Skin Health and Integrity  Outcome: Ongoing, Progressing  Intervention: Optimize Skin Protection  Recent Flowsheet Documentation  Taken 12/11/2022 1418 by Mary Tobar RN  Pressure Reduction Techniques:   heels elevated off  bed   frequent weight shift encouraged  Pressure Reduction Devices:   specialty bed utilized   pressure-redistributing mattress utilized  Skin Protection: skin sealant/moisture barrier applied  Taken 12/11/2022 0911 by Mary Tobar RN  Pressure Reduction Techniques:   frequent weight shift encouraged   heels elevated off bed   weight shift assistance provided  Head of Bed (HOB) Positioning: HOB at 20-30 degrees  Pressure Reduction Devices:   pressure-redistributing mattress utilized   specialty bed utilized  Skin Protection: skin sealant/moisture barrier applied     Problem: Restraint, Nonbehavioral (Nonviolent)  Goal: Absence of Harm or Injury  Outcome: Ongoing, Progressing  Intervention: Implement Least Restrictive Safety Strategies  Recent Flowsheet Documentation  Taken 12/11/2022 1418 by Mary Tobar RN  Diversional Activities: television  Taken 12/11/2022 0911 by Mary Tobar RN  Diversional Activities: television  Intervention: Protect Skin and Joint Integrity  Recent Flowsheet Documentation  Taken 12/11/2022 1607 by Mary Tobar RN  Body Position:   position changed independently   left   turned  Taken 12/11/2022 1418 by Mary Tobar RN  Body Position:   position changed independently   supine  Taken 12/11/2022 1201 by Mary Tobar RN  Body Position:   left   turned   position changed independently  Taken 12/11/2022 0911 by Mary Tobar RN  Body Position: supine, legs elevated     Problem: Aspiration (Enteral Nutrition)  Goal: Absence of Aspiration Signs and Symptoms  Outcome: Ongoing, Progressing  Intervention: Minimize Aspiration Risk  Recent Flowsheet Documentation  Taken 12/11/2022 0911 by Mary Tobar RN  Head of Bed (HOB) Positioning: HOB at 20-30 degrees     Problem: Device-Related Complication Risk (Enteral Nutrition)  Goal: Safe, Effective Therapy Delivery  Outcome: Ongoing, Progressing     Problem: Feeding Intolerance (Enteral Nutrition)  Goal: Feeding  Tolerance  Outcome: Ongoing, Progressing   Goal Outcome Evaluation:

## 2022-12-11 NOTE — PROGRESS NOTES
[facility] NEUROSURGERY PROGRESS NOTE      CC:POD 18 bilateral lami, neurolysis of L4 nerve root with fusion and instrumentation for severe pain in the legs and back.      Subjective     Interval History: Dopplers neg for DVT. Rehab referrals pending. Medical issues appear to be stable at this time. No new complaints. Remains afebrile and w/o elevated WBC. Hgb stabilized. Denies any issues w/ bowel/bladder function.      Objective     Vital signs in last 24 hours:  Temp:  [98.2 °F (36.8 °C)-98.8 °F (37.1 °C)] 98.2 °F (36.8 °C)  Heart Rate:  [82-91] 91  Resp:  [16-18] 16  BP: (129-167)/(62-70) 167/62    Intake/Output this shift:  No intake/output data recorded.    LABS:  Results from last 7 days   Lab Units 12/11/22  0504 12/09/22  2246 12/09/22  0556 12/06/22  0555 12/05/22  0643   WBC 10*3/mm3 8.14 7.92 6.56   < > 8.65   HEMOGLOBIN g/dL 9.8* 9.2*  9.4* 7.5*   < > 7.9*   HEMATOCRIT % 30.3* 27.8*  29.4* 23.3*   < > 29.4*   PLATELETS 10*3/mm3 340 322 286   < > 250   MONOCYTES % %  --   --   --   --  1.0*    < > = values in this interval not displayed.     Results from last 7 days   Lab Units 12/11/22  0504 12/09/22  2347 12/09/22  0556   SODIUM mmol/L 135* 132* 131*   POTASSIUM mmol/L 4.1 4.3 3.7   CHLORIDE mmol/L 100 103 101   CO2 mmol/L 25.4 23.1 26.0   BUN mg/dL 11 11 16   CREATININE mg/dL 0.62* 0.67* 0.66*   CALCIUM mg/dL 8.3* 8.0* 7.8*   BILIRUBIN mg/dL 0.3 0.3 0.2   ALK PHOS U/L 128* 122* 115   ALT (SGPT) U/L 67* 64* 71*   AST (SGOT) U/L 48* 41* 46*   GLUCOSE mg/dL 115* 99 107*       IMAGING STUDIES:  No radiology results for the last day      I personally viewed and interpreted the patient's chart.    Meds reviewed/changed: Yes      Physical Exam:    General:   Awake, alert, oriented x3. Speech clear with no aphasia     Motor:    5/5 bilateral DF/PF and iliopsoas strength. No fasciculations, rigidity, spasticity, or abnormal movements.  Reflexes:   No clonus  Sensation:   Normal to light touch bilateral  "LE  Station and Gait:             Deferred to PT due to recent surgery      Assessment & Plan     ASSESSMENT:      Lumbar spinal stenosis    Postoperative urinary retention    Postoperative ileus (HCC)    Leukocytosis (leucocytosis)    Spinal stenosis of lumbar region with neurogenic claudication    Severe malnutrition (HCC)      PLAN:   D/C to rehab once he has a placement  Continue mobility work w/ PT/OT  Continue Lovenox for DVT prophylaxis    I discussed the patient's findings and my recommendations with patient and Dr. Altman.       LOS: 12 days       Christina Anderson PA-C  12/11/2022  08:45 EST    \"Dictated utilizing Dragon dictation\".      "

## 2022-12-12 PROBLEM — K91.89 POSTOPERATIVE ILEUS: Status: RESOLVED | Noted: 2022-11-27 | Resolved: 2022-12-12

## 2022-12-12 PROBLEM — K56.7 POSTOPERATIVE ILEUS (HCC): Status: RESOLVED | Noted: 2022-11-27 | Resolved: 2022-12-12

## 2022-12-12 PROBLEM — D72.829 LEUKOCYTOSIS (LEUCOCYTOSIS): Status: RESOLVED | Noted: 2022-11-27 | Resolved: 2022-12-12

## 2022-12-12 LAB
ALBUMIN SERPL-MCNC: 2.8 G/DL (ref 3.5–5.2)
ALBUMIN/GLOB SERPL: 1 G/DL
ALP SERPL-CCNC: 135 U/L (ref 39–117)
ALT SERPL W P-5'-P-CCNC: 65 U/L (ref 1–41)
ANION GAP SERPL CALCULATED.3IONS-SCNC: 6.2 MMOL/L (ref 5–15)
AST SERPL-CCNC: 36 U/L (ref 1–40)
BILIRUB SERPL-MCNC: 0.2 MG/DL (ref 0–1.2)
BUN SERPL-MCNC: 13 MG/DL (ref 8–23)
BUN/CREAT SERPL: 17.8 (ref 7–25)
CALCIUM SPEC-SCNC: 8.3 MG/DL (ref 8.6–10.5)
CHLORIDE SERPL-SCNC: 100 MMOL/L (ref 98–107)
CO2 SERPL-SCNC: 26.8 MMOL/L (ref 22–29)
CREAT SERPL-MCNC: 0.73 MG/DL (ref 0.76–1.27)
DEPRECATED RDW RBC AUTO: 46.4 FL (ref 37–54)
EGFRCR SERPLBLD CKD-EPI 2021: 90.8 ML/MIN/1.73
ERYTHROCYTE [DISTWIDTH] IN BLOOD BY AUTOMATED COUNT: 14.5 % (ref 12.3–15.4)
GLOBULIN UR ELPH-MCNC: 2.9 GM/DL
GLUCOSE SERPL-MCNC: 114 MG/DL (ref 65–99)
HCT VFR BLD AUTO: 31.1 % (ref 37.5–51)
HGB BLD-MCNC: 9.9 G/DL (ref 13–17.7)
MCH RBC QN AUTO: 28.2 PG (ref 26.6–33)
MCHC RBC AUTO-ENTMCNC: 31.8 G/DL (ref 31.5–35.7)
MCV RBC AUTO: 88.6 FL (ref 79–97)
PLATELET # BLD AUTO: 338 10*3/MM3 (ref 140–450)
PMV BLD AUTO: 9.3 FL (ref 6–12)
POTASSIUM SERPL-SCNC: 4 MMOL/L (ref 3.5–5.2)
PROT SERPL-MCNC: 5.7 G/DL (ref 6–8.5)
RBC # BLD AUTO: 3.51 10*6/MM3 (ref 4.14–5.8)
SODIUM SERPL-SCNC: 133 MMOL/L (ref 136–145)
WBC NRBC COR # BLD: 8.69 10*3/MM3 (ref 3.4–10.8)

## 2022-12-12 PROCEDURE — 97530 THERAPEUTIC ACTIVITIES: CPT

## 2022-12-12 PROCEDURE — 85027 COMPLETE CBC AUTOMATED: CPT | Performed by: NURSE PRACTITIONER

## 2022-12-12 PROCEDURE — 80053 COMPREHEN METABOLIC PANEL: CPT | Performed by: HOSPITALIST

## 2022-12-12 PROCEDURE — 25010000002 ENOXAPARIN PER 10 MG: Performed by: NURSE PRACTITIONER

## 2022-12-12 PROCEDURE — 99024 POSTOP FOLLOW-UP VISIT: CPT | Performed by: NURSE PRACTITIONER

## 2022-12-12 RX ADMIN — OLANZAPINE 2.5 MG: 2.5 TABLET ORAL at 17:05

## 2022-12-12 RX ADMIN — Medication 10 ML: at 20:23

## 2022-12-12 RX ADMIN — MULTIPLE VITAMINS W/ MINERALS TAB 1 TABLET: TAB at 08:20

## 2022-12-12 RX ADMIN — Medication 3 ML: at 08:21

## 2022-12-12 RX ADMIN — ACETAMINOPHEN 1000 MG: 325 TABLET, FILM COATED ORAL at 08:24

## 2022-12-12 RX ADMIN — METOPROLOL TARTRATE 12.5 MG: 25 TABLET, FILM COATED ORAL at 20:23

## 2022-12-12 RX ADMIN — PANTOPRAZOLE SODIUM 40 MG: 40 INJECTION, POWDER, FOR SOLUTION INTRAVENOUS at 05:44

## 2022-12-12 RX ADMIN — Medication 10 ML: at 08:20

## 2022-12-12 RX ADMIN — TAMSULOSIN HYDROCHLORIDE 0.4 MG: 0.4 CAPSULE ORAL at 08:20

## 2022-12-12 RX ADMIN — ENOXAPARIN SODIUM 40 MG: 100 INJECTION SUBCUTANEOUS at 17:05

## 2022-12-12 RX ADMIN — CLOPIDOGREL 75 MG: 75 TABLET, FILM COATED ORAL at 08:20

## 2022-12-12 RX ADMIN — METOPROLOL TARTRATE 12.5 MG: 25 TABLET, FILM COATED ORAL at 08:20

## 2022-12-12 NOTE — THERAPY TREATMENT NOTE
Patient Name: Boni Hernandez  : 1940    MRN: [id number]                              Today's Date: 2022       Admit Date: 2022    Visit Dx:     ICD-10-CM ICD-9-CM   1. Spinal stenosis of lumbar region with neurogenic claudication  M48.062 724.03     Patient Active Problem List   Diagnosis   • Lumbar spinal stenosis   • DDD (degenerative disc disease), lumbar   • Spondylolisthesis of lumbar region   • Hyperlipidemia   • HTN (hypertension)   • Coronary artery disease   • BPH (benign prostatic hyperplasia)   • GERD (gastroesophageal reflux disease)   • Constipation   • Postoperative urinary retention   • Spinal stenosis of lumbar region with neurogenic claudication   • Severe malnutrition (HCC)     Past Medical History:   Diagnosis Date   • BPH (benign prostatic hyperplasia)    • Cataract    • Coronary artery disease    • DDD (degenerative disc disease), lumbar    • GERD (gastroesophageal reflux disease)    • History of MI (myocardial infarction)     APPROX. 2 YEARS AGO, HAS STENT, FOLLOWED BY DR LOREDO, 10/21/19 STRESS TEST AT [facility]   • [place] (hard of hearing)    • HTN (hypertension)    • Hyperlipidemia    • Injury of back    • Low back pain     RADIATES DOWN BOTH LEGS   • Postoperative ileus (HCC) 2022   • Spinal stenosis      Past Surgical History:   Procedure Laterality Date   • CAROTID ENDARTERECTOMY Left 2017   • CATARACT EXTRACTION WITH INTRAOCULAR LENS IMPLANT Bilateral    • COLONOSCOPY     • CORONARY ANGIOPLASTY WITH STENT PLACEMENT  2016   • DENTAL PROCEDURE      DENTAL IMPLANTS   • EPIDURAL BLOCK     • LUMBAR DISCECTOMY FUSION INSTRUMENTATION N/A 2019    Procedure: Lumbar 4 to lumbar 5 laminectomy with a posterior lateral fusion and instrumentation;  Surgeon: Matthew Alex MD;  Location: [facility];  Service: Neurosurgery   • LUMBAR FUSION N/A 2022    Procedure: Lumbar 3 to lumbar 4 laminectomy with fusion and instrumentation and removal of previous  instrumentation at lumbar 4 to lumbar 5;  Surgeon: Matthew Alex MD;  Location: [facility] MAIN OR;  Service: Rob[employer] - Neuro;  Laterality: N/A;   • OTHER SURGICAL HISTORY  2015    PT STATES HE HAS HAD STENTS PUT IN BOTH LEGS.    • TONSILLECTOMY        General Information     [facility] Name 12/12/22 [facility]          Physical Therapy Time and Intention    Document Type therapy note (daily note)  -     Mode of Treatment physical therapy  -[facility] Name 12/12/22 [facility]          General Information    Patient Profile Reviewed yes  -     Existing Precautions/Restrictions fall;spinal  -[facility] Name 12/12/22 1235          Cognition    Orientation Status (Cognition) oriented to;person;situation;place  -[facility] Name 12/12/22 1235          Safety Issues, Functional Mobility    Impairments Affecting Function (Mobility) balance;endurance/activity tolerance;strength;postural/trunk control;range of motion (ROM)  -           User Key  (r) = Recorded By, (t) = Taken By, (c) = Cosigned By    Initials Name Provider Type     Dejah Salazar PT Physical Therapist               Mobility     Row Name 12/12/22 [facility]          Bed Mobility    Bed Mobility supine-sit;sit-supine  -     Supine-Sit [place] (Bed Mobility) verbal cues;nonverbal cues (demo/gesture);2 person assist;minimum assist (75% patient effort)  -     Sit-Supine [place] (Bed Mobility) moderate assist (50% patient effort);2 person assist  -     Assistive Device (Bed Mobility) bed rails;head of bed elevated  -[facility] Name 12/12/22 1235          Sit-Stand Transfer    Sit-Stand [place] (Transfers) moderate assist (50% patient effort);maximum assist (25% patient effort);2 person assist;verbal cues  -     Assistive Device (Sit-Stand Transfers) walker, front-wheeled  -     Comment, (Sit-Stand Transfer) Completed ~5 STS with rwx, strong posterior lean with forward flexed posture over rwx - max cues to correct, able to stand for ~10-15 seconds on last  2 attempts  -     Row Name 12/12/22 1235          Gait/Stairs (Locomotion)    [place] Level (Gait) moderate assist (50% patient effort);2 person assist;nonverbal cues (demo/gesture);verbal cues  -     Assistive Device (Gait) walker, front-wheeled  -     Distance in Feet (Gait) Side steps to HOB  -     Deviations/Abnormal Patterns (Gait) yong decreased;stride length decreased  -     Bilateral Gait Deviations forward flexed posture;knee buckling bilaterally  -           User Key  (r) = Recorded By, (t) = Taken By, (c) = Cosigned By    Initials Name Provider Type     Dejah Salazar, PT Physical Therapist               Obj/Interventions    No documentation.                Goals/Plan     Row Name 12/12/22 1303          Bed Mobility Goal 1 (PT)    Activity/Assistive Device (Bed Mobility Goal 1, PT) bed mobility activities, all  -     [place] Level/Cues Needed (Bed Mobility Goal 1, PT) minimum assist (75% or more patient effort)  -     Time Frame (Bed Mobility Goal 1, PT) 1 week  -     Progress/Outcomes (Bed Mobility Goal 1, PT) goal ongoing  -[facility] Name 12/12/22 1303          Transfer Goal 1 (PT)    Activity/Assistive Device (Transfer Goal 1, PT) transfers, all  -     [place] Level/Cues Needed (Transfer Goal 1, PT) minimum assist (75% or more patient effort)  -     Time Frame (Transfer Goal 1, PT) 1 week  -     Progress/Outcome (Transfer Goal 1, PT) goal ongoing  -[facility] Name 12/12/22 1303          Gait Training Goal 1 (PT)    Activity/Assistive Device (Gait Training Goal 1, PT) gait (walking locomotion);walker, rolling  -     [place] Level (Gait Training Goal 1, PT) minimum assist (75% or more patient effort)  -     Distance (Gait Training Goal 1, PT) 25  -     Time Frame (Gait Training Goal 1, PT) 1 week  -     Progress/Outcome (Gait Training Goal 1, PT) goal ongoing  -           User Key  (r) = Recorded By, (t) = Taken By, (c) = Cosigned By     Initials Name Provider Type     Dejah Salazar, PT Physical Therapist               Clinical Impression     Row Name 12/12/22 1242          Pain    Pretreatment Pain Rating 0/10 - no pain  -     Pre/Posttreatment Pain Comment C/o back pain with mobility, no numerical value given  -     Pain Intervention(s) Repositioned;Ambulation/increased activity;Rest  -     Row Name 12/12/22 1242          Plan of Care Review    Plan of Care Reviewed With patient  -     Progress no change  -     Outcome Evaluation Pt seen for PT this AM, transferred to EOB with min A x2 and cues for log roll. Increased assist for bringing trunk to sit. Pt c/o lightheadedness with sitting up - increased time spent EOB working on midline positioning and upright posture. Pt completed multiple stands requiring max A x2 and rwx. Pt cued for extending hips/knees and bringing shoulders back. Pt fatigues quickly, but able to stand for ~10-15 seconds on last 2 attempts. Pt completed additional stand and able to take 3 side steps to HOB with mod A x2 with heavy cues for sequencing and increased assist 2/2 B knee buckling - assisted back to sitting and then supine with mod A x2. Pt repositioned and left with all needs met. Pt is very weak - noted increased LLE>RLE weakness, encouraged LE exercises in bed throughout the day. PT discussed with RN following session - may be able to transfer to chair later today after rest. PT will continue to follow to progress mobility as tolerated. Anticipate DC to SNF.  -     Row Name 12/12/22 1242          Vital Signs    O2 Delivery Pre Treatment room air  -     O2 Delivery Intra Treatment room air  -     O2 Delivery Post Treatment room air  -     Row Name 12/12/22 1242          Positioning and Restraints    Pre-Treatment Position in bed  -     Post Treatment Position bed  -BH     In Bed notified nsg;fowlers;call light within reach;encouraged to call for assist;exit alarm on  -           User Key   (r) = Recorded By, (t) = Taken By, (c) = Cosigned By    Initials Name Provider Type     Dejah Salazar PT Physical Therapist               Outcome Measures     Row Name 12/12/22 1303          How much help from another person do you currently need...    Turning from your back to your side while in flat bed without using bedrails? 2  -BH     Moving from lying on back to sitting on the side of a flat bed without bedrails? 2  -BH     Moving to and from a bed to a chair (including a wheelchair)? 2  -BH     Standing up from a chair using your arms (e.g., wheelchair, bedside chair)? 2  -BH     Climbing 3-5 steps with a railing? 1  -BH     To walk in hospital room? 2  -BH     AM-PAC 6 Clicks Score (PT) 11  -     Highest level of mobility 4 --> Transferred to chair/commode  -     Row Name 12/12/22 1303          Functional Assessment    Outcome Measure Options AM-PAC 6 Clicks Basic Mobility (PT)  -           User Key  (r) = Recorded By, (t) = Taken By, (c) = Cosigned By    Initials Name Provider Type     Dejah Salazar PT Physical Therapist                             Physical Therapy Education     Title: PT OT SLP Therapies (Done)     Topic: Physical Therapy (Done)     Point: Mobility training (Done)     Learning Progress Summary           Patient Acceptance, E,TB,D, VU,NR by  at 12/12/2022 1303    Acceptance, E,D, VU,NR by PH at 12/10/2022 1309    Acceptance, E,TB,D, VU,NR by  at 12/6/2022 1704    Acceptance, E, NR by EM at 12/2/2022 1346    Acceptance, E, NR by EM at 11/30/2022 1629    Acceptance, E,D, NR,NL by PH at 11/29/2022 1548    Acceptance, E,TB, VU,DU by LB at 11/27/2022 1400    Acceptance, E,TB,D, VU,DU by LB at 11/26/2022 1245    Acceptance, E,D, VU,NR by MS at 11/25/2022 1156    Acceptance, E,TB, VU,DU by CALI at 11/24/2022 1135                   Point: Home exercise program (Done)     Learning Progress Summary           Patient Acceptance, E,TB,D, VU,NR by  at 12/12/2022 1303    Acceptance,  E,D, VU,NR by PH at 12/10/2022 1309    Acceptance, E,TB,D, VU,NR by  at 12/6/2022 1704    Acceptance, E,D, NR,NL by PH at 11/29/2022 1548    Acceptance, E,TB, VU,DU by LB at 11/27/2022 1400    Acceptance, E,TB,D, VU,DU by LB at 11/26/2022 1245    Acceptance, E,D, VU,NR by MS at 11/25/2022 1156    Acceptance, E,TB, VU,DU by CALI at 11/24/2022 1135                   Point: Body mechanics (Done)     Learning Progress Summary           Patient Acceptance, E,TB,D, VU,NR by  at 12/12/2022 1303    Acceptance, E,D, VU,NR by PH at 12/10/2022 1309    Acceptance, E,TB,D, VU,NR by  at 12/6/2022 1704    Acceptance, E,D, NR,NL by PH at 11/29/2022 1548    Acceptance, E,TB, VU,DU by LB at 11/27/2022 1400    Acceptance, E,TB,D, VU,DU by LB at 11/26/2022 1245    Acceptance, E,D, VU,NR by MS at 11/25/2022 1156    Acceptance, E,TB, VU,DU by CALI at 11/24/2022 1135                   Point: Precautions (Done)     Learning Progress Summary           Patient Acceptance, E,TB,D, VU,NR by  at 12/12/2022 1303    Acceptance, E,D, VU,NR by PH at 12/10/2022 1309    Acceptance, E,TB,D, VU,NR by  at 12/6/2022 1704    Acceptance, E,D, NR,NL by PH at 11/29/2022 1548    Acceptance, E,TB, VU,DU by LB at 11/27/2022 1400    Acceptance, E,TB,D, VU,DU by LB at 11/26/2022 1245    Acceptance, E,D, VU,NR by MS at 11/25/2022 1156    Acceptance, E,TB, VU,DU by CALI at 11/24/2022 1135                               User Key     Initials Effective Dates Name Provider Type Discipline    EM 06/16/21 -  Virgie Lynch, PT Physical Therapist PT    MS 06/16/21 -  Martinez Solares, PT Physical Therapist PT    CALI 05/19/21 -  Merlene Haines, PT Physical Therapist PT    LB 08/09/20 -  Leticia Cadet, PT Physical Therapist PT    PH 06/16/21 -  Miri Khan, PTA Physical Therapist Assistant PT     04/08/22 -  Dejah Salazar, PT Physical Therapist PT              PT Recommendation and Plan     Plan of Care Reviewed With: patient  Progress: no  change  Outcome Evaluation: Pt seen for PT this AM, transferred to EOB with min A x2 and cues for log roll. Increased assist for bringing trunk to sit. Pt c/o lightheadedness with sitting up - increased time spent EOB working on midline positioning and upright posture. Pt completed multiple stands requiring max A x2 and rwx. Pt cued for extending hips/knees and bringing shoulders back. Pt fatigues quickly, but able to stand for ~10-15 seconds on last 2 attempts. Pt completed additional stand and able to take 3 side steps to HOB with mod A x2 with heavy cues for sequencing and increased assist 2/2 B knee buckling - assisted back to sitting and then supine with mod A x2. Pt repositioned and left with all needs met. Pt is very weak - noted increased LLE>RLE weakness, encouraged LE exercises in bed throughout the day. PT discussed with RN following session - may be able to transfer to chair later today after rest. PT will continue to follow to progress mobility as tolerated. Anticipate DC to SNF.     Time Calculation:    PT Charges     Row Name 12/12/22 1304             Time Calculation    Start Time 1109  -      Stop Time 1129  -      Time Calculation (min) 20 min  -      PT Received On 12/12/22  -      PT - Next Appointment 12/13/22  -      PT Goal Re-Cert Due Date 12/19/22  -         Time Calculation- PT    Total Timed Code Minutes- PT 20 minute(s)  -         Timed Charges    88382 - PT Therapeutic Activity Minutes 20  -BH         Total Minutes    Timed Charges Total Minutes 20  -BH       Total Minutes 20  -            User Key  (r) = Recorded By, (t) = Taken By, (c) = Cosigned By    Initials Name Provider Type     Dejah Salazar PT Physical Therapist              Therapy Charges for Today     Code Description Service Date Service Provider Modifiers Qty    67203216207 HC PT THERAPEUTIC ACT EA 15 MIN 12/12/2022 Dejah Salazar PT GP 1    13560573363 HC PT THER SUPP EA 15 MIN 12/12/2022 Martin  Dejah MCNEAL, PT GP 1          PT G-Codes  Outcome Measure Options: AM-PAC 6 Clicks Basic Mobility (PT)  AM-PAC 6 Clicks Score (PT): 11  AM-PAC 6 Clicks Score (OT): 15  PT Discharge Summary  Anticipated Discharge Disposition (PT): skilled nursing facility    Dejah Salazar, PT  12/12/2022

## 2022-12-12 NOTE — PROGRESS NOTES
NEUROSURGERY POT OP PROGRESS NOTE     LOS: 13 days   Patient Care Team:  Kiki Weiss APRN as PCP - General (Nurse Practitioner)  Mark Abraham MD as Consulting Physician (Cardiology)      Interval History: Off TPN for nearly a week. Advanced to soft to chew diet (chopped meats) with thin liquids. Tolerating well. Eating % meals per dietician note. Did not qualify for acute rehab due to inability to participate in 3 hours of therapy daily. Cognition also improved.      History taken from: patient chart RN    Objective        Vital Signs  Temp:  [98.2 °F (36.8 °C)-99.2 °F (37.3 °C)] 98.3 °F (36.8 °C)  Heart Rate:  [77-97] 97  Resp:  [16-18] 18  BP: (112-142)/(43-77) 142/77       Physical Exam:     AA&O x 3.   No motor deficits on exam but generally weak from extended hospital stay due to medical problems  No calf swelling or tenderness to bilateral palpation.        Results Review:     I reviewed the patient's new clinical results.     .  Results from last 7 days   Lab Units 12/12/22  0544 12/11/22  0504 12/09/22  2246   WBC 10*3/mm3 8.69 8.14 7.92   HEMOGLOBIN g/dL 9.9* 9.8* 9.2*  9.4*   HEMATOCRIT % 31.1* 30.3* 27.8*  29.4*   PLATELETS 10*3/mm3 338 340 322     .  Results from last 7 days   Lab Units 12/12/22  0544 12/11/22  0504 12/09/22  2347   SODIUM mmol/L 133* 135* 132*   POTASSIUM mmol/L 4.0 4.1 4.3   CHLORIDE mmol/L 100 100 103   CO2 mmol/L 26.8 25.4 23.1   BUN mg/dL 13 11 11   CREATININE mg/dL 0.73* 0.62* 0.67*   GLUCOSE mg/dL 114* 115* 99   CALCIUM mg/dL 8.3* 8.3* 8.0*     LOWER EXTREMITY VENOUS DOPPLERS 12/10/22    Bilateral lower extremities negative for DVT      Assessment & Plan       Lumbar spinal stenosis    Postoperative urinary retention    Spinal stenosis of lumbar region with neurogenic claudication    Severe malnutrition (HCC)      POD 19 bilateral L3/4 laminectomy with neurolysis L4 nerve roots with instrumentation and fusion L3/4 and removal of previous fusion instrumentation at L4/5  for lumbar spinal stenosis with neurogenic claudication.   Immobilization syndrome  Hx of CAD w stent LAD March 2021 - Plavix resumed 12/08/22  Post op ileus - resolved, resumed reg diet 12/07    Continue to mobilize  Rehab saw. Due to current functional status, patient is not appropriate for 3 hours of therapy per day. Will need to go to subacute rehab. Family prefers [facility] [facility] [place] [facility]  Transportation arranged for tomorrow.       Sakina Goldstein, RUPERT  12/12/22  10:30 EST

## 2022-12-12 NOTE — CASE MANAGEMENT/SOCIAL WORK
Continued Stay Note  [facility]     Patient Name: Boni Hernandez  MRN: [id number]  Today's Date: 12/12/2022    Admit Date: 11/23/2022    Plan: Plan is skilled bed at [facility] and [facility] PENDING precert   Discharge Plan     Row Name 12/12/22 1107       Plan    Plan Plan is skilled bed at [facility] and [facility] PENDING precert    Plan Comments S/W Najma/ Mal who confirms pt accepted at [facility] pending precert.  CCP spoke w/ pt's spouse Shikha who confirms that is their first choice.  Najma will have the facility initiate precert today. Await determination. ..........Halley KIRK/ DARYL               Discharge Codes    No documentation.               Expected Discharge Date and Time     Expected Discharge Date Expected Discharge Time    Dec 13, 2022             Halley Pickard RN

## 2022-12-12 NOTE — PLAN OF CARE
Goal Outcome Evaluation:  Plan of Care Reviewed With: patient        Progress: no change  Outcome Evaluation: Pt seen for PT this AM, transferred to EOB with min A x2 and cues for log roll. Increased assist for bringing trunk to sit. Pt c/o lightheadedness with sitting up - increased time spent EOB working on midline positioning and upright posture. Pt completed multiple stands requiring max A x2 and rwx. Pt cued for extending hips/knees and bringing shoulders back. Pt fatigues quickly, but able to stand for ~10-15 seconds on last 2 attempts. Pt completed additional stand and able to take 3 side steps to HOB with mod A x2 with heavy cues for sequencing and increased assist 2/2 B knee buckling - assisted back to sitting and then supine with mod A x2. Pt repositioned and left with all needs met. Pt is very weak - noted increased LLE>RLE weakness, encouraged LE exercises in bed throughout the day. PT discussed with RN following session - may be able to transfer to chair later today after rest. PT will continue to follow to progress mobility as tolerated. Anticipate DC to SNF.

## 2022-12-12 NOTE — CASE MANAGEMENT/SOCIAL WORK
Continued Stay Note  [facility]     Patient Name: Boni Hernandez  MRN: [id number]  Today's Date: 12/12/2022    Admit Date: 11/23/2022    Plan: Plan is skilled bed at [facility] received.   Discharge Plan     Row Name 12/12/22 1508       Plan    [facility].    Plan Comments S/W Najma/ [facility] who confirms precert has been received and bed is available.  Pt will need stretcher transport. .........Halley KIRK/ DARYL               Discharge Codes    No documentation.               Expected Discharge Date and Time     Expected Discharge Date Expected Discharge Time    Dec 13, 2022             Halley Pickard RN

## 2022-12-12 NOTE — PROGRESS NOTES
[facility]    No beds available this week. Noted patient and patient wife prefer rehab closer to home at [facility] and they are initiating precert. Will sign off.    Lyly Hernandez RN  Rehab Admissions Coordinator  [phone]

## 2022-12-12 NOTE — PLAN OF CARE
Problem: Adult Inpatient Plan of Care  Goal: Plan of Care Review  Outcome: Ongoing, Progressing  Flowsheets (Taken 12/12/2022 1617)  Progress: improving  Plan of Care Reviewed With: patient  Outcome Evaluation: vss, no falls, c/o pain, prn tylenol, turn q2, dc tomorrow to rehab, continue to monitor  Goal: Patient-Specific Goal (Individualized)  Outcome: Ongoing, Progressing  Goal: Absence of Hospital-Acquired Illness or Injury  Outcome: Ongoing, Progressing  Intervention: Identify and Manage Fall Risk  Recent Flowsheet Documentation  Taken 12/12/2022 1604 by Mary Tobar RN  Safety Promotion/Fall Prevention: safety round/check completed  Taken 12/12/2022 1441 by Mary Tobar RN  Safety Promotion/Fall Prevention: safety round/check completed  Taken 12/12/2022 1235 by Mary Tobar RN  Safety Promotion/Fall Prevention: safety round/check completed  Taken 12/12/2022 1007 by Mary Tobar RN  Safety Promotion/Fall Prevention: safety round/check completed  Taken 12/12/2022 0821 by Mary Tobar RN  Safety Promotion/Fall Prevention:   clutter free environment maintained   assistive device/personal items within reach   activity supervised   fall prevention program maintained   nonskid shoes/slippers when out of bed   room organization consistent   safety round/check completed  Taken 12/12/2022 0752 by Mary Tboar RN  Safety Promotion/Fall Prevention: safety round/check completed  Intervention: Prevent Skin Injury  Recent Flowsheet Documentation  Taken 12/12/2022 1604 by Mary Tobar RN  Body Position: supine  Taken 12/12/2022 1441 by Mary Tobar RN  Body Position:   left   turned  Taken 12/12/2022 1235 by Mary Tobar RN  Body Position: supine  Taken 12/12/2022 1007 by Mary Tobar RN  Body Position:   right   turned  Taken 12/12/2022 0821 by Mary Tobar RN  Body Position: supine, legs elevated  Skin Protection: skin sealant/moisture barrier applied  Taken  12/12/2022 0752 by Mary Tobar RN  Body Position:   left   turned   position changed independently  Intervention: Prevent and Manage VTE (Venous Thromboembolism) Risk  Recent Flowsheet Documentation  Taken 12/12/2022 1441 by Mary Tobar RN  Activity Management: activity adjusted per tolerance  Taken 12/12/2022 1235 by Mary Tobar RN  Activity Management: activity adjusted per tolerance  Taken 12/12/2022 0821 by Mary Tobar RN  Activity Management: activity adjusted per tolerance  Taken 12/12/2022 0752 by Mary Tobar RN  Activity Management: activity adjusted per tolerance  Goal: Optimal Comfort and Wellbeing  Outcome: Ongoing, Progressing  Intervention: Monitor Pain and Promote Comfort  Recent Flowsheet Documentation  Taken 12/12/2022 1441 by Mary Tobar RN  Pain Management Interventions: position adjusted  Taken 12/12/2022 0821 by Mary Tobar RN  Pain Management Interventions:   see MAR   pillow support provided   position adjusted  Goal: Readiness for Transition of Care  Outcome: Ongoing, Progressing     Problem: Fall Injury Risk  Goal: Absence of Fall and Fall-Related Injury  Outcome: Ongoing, Progressing  Intervention: Promote Injury-Free Environment  Recent Flowsheet Documentation  Taken 12/12/2022 1604 by Mary Tobar RN  Safety Promotion/Fall Prevention: safety round/check completed  Taken 12/12/2022 1441 by Mary Tobar RN  Safety Promotion/Fall Prevention: safety round/check completed  Taken 12/12/2022 1235 by Mary Tobar RN  Safety Promotion/Fall Prevention: safety round/check completed  Taken 12/12/2022 1007 by Mary Tobar RN  Safety Promotion/Fall Prevention: safety round/check completed  Taken 12/12/2022 0821 by Mary Tobar RN  Safety Promotion/Fall Prevention:   clutter free environment maintained   assistive device/personal items within reach   activity supervised   fall prevention program maintained   nonskid  shoes/slippers when out of bed   room organization consistent   safety round/check completed  Taken 12/12/2022 0752 by Mary Tobar RN  Safety Promotion/Fall Prevention: safety round/check completed     Problem: Hypertension Comorbidity  Goal: Blood Pressure in Desired Range  Outcome: Ongoing, Progressing     Problem: Bleeding (Spinal Surgery)  Goal: Absence of Bleeding  Outcome: Ongoing, Progressing     Problem: Bowel Motility Impaired (Spinal Surgery)  Goal: Effective Bowel Elimination  Outcome: Ongoing, Progressing     Problem: Fluid and Electrolyte Imbalance (Spinal Surgery)  Goal: Fluid and Electrolyte Balance  Outcome: Ongoing, Progressing     Problem: Functional Ability Impaired (Spinal Surgery)  Goal: Optimal Functional Ability  Outcome: Ongoing, Progressing  Intervention: Optimize Functional Status  Recent Flowsheet Documentation  Taken 12/12/2022 1441 by Mary Tobar RN  Activity Management: activity adjusted per tolerance  Taken 12/12/2022 1235 by Mary Tobar RN  Activity Management: activity adjusted per tolerance  Taken 12/12/2022 0821 by Mary Tobar RN  Activity Management: activity adjusted per tolerance  Positioning/Transfer Devices:   pillows   in use  Taken 12/12/2022 0752 by Mary Tobar RN  Activity Management: activity adjusted per tolerance     Problem: Infection (Spinal Surgery)  Goal: Absence of Infection Signs and Symptoms  Outcome: Ongoing, Progressing     Problem: Neurologic Impairment (Spinal Surgery)  Goal: Optimal Neurologic Function  Outcome: Ongoing, Progressing  Intervention: Optimize Neurologic Function  Recent Flowsheet Documentation  Taken 12/12/2022 1604 by Mary Tobar RN  Body Position: supine  Taken 12/12/2022 1441 by Mary Tobar RN  Body Position:   left   turned  Taken 12/12/2022 1235 by Mary Tobar RN  Body Position: supine  Taken 12/12/2022 1007 by Mary Tobar RN  Body Position:   right   turned  Taken 12/12/2022  0821 by Mary Tobar RN  Body Position: supine, legs elevated  Pressure Reduction Devices:   pressure-redistributing mattress utilized   specialty bed utilized  Taken 12/12/2022 0752 by Mary Tobar RN  Body Position:   left   turned   position changed independently     Problem: Ongoing Anesthesia Effects (Spinal Surgery)  Goal: Anesthesia/Sedation Recovery  Outcome: Ongoing, Progressing  Intervention: Optimize Anesthesia Recovery  Recent Flowsheet Documentation  Taken 12/12/2022 1604 by Mary Tobar RN  Safety Promotion/Fall Prevention: safety round/check completed  Taken 12/12/2022 1552 by Mary Tobar RN  Administration (IS): self-administered  Taken 12/12/2022 1441 by Mary Tobar RN  Patient Tolerance (IS): good  Safety Promotion/Fall Prevention: safety round/check completed  Level Incentive Spirometer (mL): 2500  Number of Repetitions (IS): 5  Taken 12/12/2022 1235 by Mary Tobar RN  Safety Promotion/Fall Prevention: safety round/check completed  Taken 12/12/2022 1200 by Mary Tobar RN  Administration (IS): self-administered  Taken 12/12/2022 1007 by Mary Tobar RN  Safety Promotion/Fall Prevention: safety round/check completed  Administration (IS): (pt sleeping) other (see comments)  Taken 12/12/2022 0821 by Mary Tobar RN  Patient Tolerance (IS): good  Safety Promotion/Fall Prevention:   clutter free environment maintained   assistive device/personal items within reach   activity supervised   fall prevention program maintained   nonskid shoes/slippers when out of bed   room organization consistent   safety round/check completed  Level Incentive Spirometer (mL): 2000  Number of Repetitions (IS): 5  Taken 12/12/2022 0752 by Mary Tobar RN  Safety Promotion/Fall Prevention: safety round/check completed     Problem: Pain (Spinal Surgery)  Goal: Acceptable Pain Control  Outcome: Ongoing, Progressing  Intervention: Prevent or Manage Pain  Recent  Flowsheet Documentation  Taken 12/12/2022 1441 by Mary Tobar RN  Pain Management Interventions: position adjusted  Diversional Activities: television  Taken 12/12/2022 0821 by Mary Tobar RN  Pain Management Interventions:   see MAR   pillow support provided   position adjusted  Diversional Activities: television     Problem: Postoperative Nausea and Vomiting (Spinal Surgery)  Goal: Nausea and Vomiting Relief  Outcome: Ongoing, Progressing     Problem: Postoperative Urinary Retention (Spinal Surgery)  Goal: Effective Urinary Elimination  Outcome: Ongoing, Progressing     Problem: Respiratory Compromise (Spinal Surgery)  Goal: Effective Oxygenation and Ventilation  Outcome: Ongoing, Progressing  Intervention: Optimize Oxygenation and Ventilation  Recent Flowsheet Documentation  Taken 12/12/2022 0821 by Mary Tobar RN  Head of Bed (HOB) Positioning: HOB at 20 degrees     Problem: Skin Injury Risk Increased  Goal: Skin Health and Integrity  Outcome: Ongoing, Progressing  Intervention: Optimize Skin Protection  Recent Flowsheet Documentation  Taken 12/12/2022 1441 by Mary Tobar RN  Pressure Reduction Techniques:   frequent weight shift encouraged   heels elevated off bed  Taken 12/12/2022 0821 by Mary Tobar RN  Pressure Reduction Techniques:   frequent weight shift encouraged   heels elevated off bed   weight shift assistance provided  Head of Bed (HOB) Positioning: HOB at 20 degrees  Pressure Reduction Devices:   pressure-redistributing mattress utilized   specialty bed utilized  Skin Protection: skin sealant/moisture barrier applied     Problem: Restraint, Nonbehavioral (Nonviolent)  Goal: Absence of Harm or Injury  Outcome: Ongoing, Progressing  Intervention: Implement Least Restrictive Safety Strategies  Recent Flowsheet Documentation  Taken 12/12/2022 1441 by Mary Tobar RN  Diversional Activities: television  Taken 12/12/2022 0821 by Mary Tobar RN  Diversional  Activities: television  Intervention: Protect Skin and Joint Integrity  Recent Flowsheet Documentation  Taken 12/12/2022 1604 by Mary Tobar RN  Body Position: supine  Taken 12/12/2022 1441 by Mary Tobar RN  Body Position:   left   turned  Taken 12/12/2022 1235 by Mary Tobar RN  Body Position: supine  Taken 12/12/2022 1007 by Mary Tobar RN  Body Position:   right   turned  Taken 12/12/2022 0821 by Mary Tobar RN  Body Position: supine, legs elevated  Taken 12/12/2022 0752 by Mary Tobar RN  Body Position:   left   turned   position changed independently     Problem: Aspiration (Enteral Nutrition)  Goal: Absence of Aspiration Signs and Symptoms  Outcome: Ongoing, Progressing  Intervention: Minimize Aspiration Risk  Recent Flowsheet Documentation  Taken 12/12/2022 0821 by Mary Tobar RN  Head of Bed (HOB) Positioning: HOB at 20 degrees     Problem: Device-Related Complication Risk (Enteral Nutrition)  Goal: Safe, Effective Therapy Delivery  Outcome: Ongoing, Progressing     Problem: Feeding Intolerance (Enteral Nutrition)  Goal: Feeding Tolerance  Outcome: Ongoing, Progressing   Goal Outcome Evaluation:  Plan of Care Reviewed With: patient        Progress: improving  Outcome Evaluation: vss, no falls, c/o pain, prn tylenol, turn q2, dc tomorrow to rehab, continue to monitor

## 2022-12-13 VITALS
WEIGHT: 120.37 LBS | BODY MASS INDEX: 18.89 KG/M2 | HEIGHT: 67 IN | RESPIRATION RATE: 18 BRPM | HEART RATE: 85 BPM | TEMPERATURE: 98.4 F | OXYGEN SATURATION: 97 % | SYSTOLIC BLOOD PRESSURE: 152 MMHG | DIASTOLIC BLOOD PRESSURE: 76 MMHG

## 2022-12-13 LAB
ALBUMIN SERPL-MCNC: 2.8 G/DL (ref 3.5–5.2)
ALBUMIN/GLOB SERPL: 1 G/DL
ALP SERPL-CCNC: 127 U/L (ref 39–117)
ALT SERPL W P-5'-P-CCNC: 51 U/L (ref 1–41)
ANION GAP SERPL CALCULATED.3IONS-SCNC: 6 MMOL/L (ref 5–15)
AST SERPL-CCNC: 25 U/L (ref 1–40)
BILIRUB SERPL-MCNC: 0.3 MG/DL (ref 0–1.2)
BUN SERPL-MCNC: 12 MG/DL (ref 8–23)
BUN/CREAT SERPL: 17.1 (ref 7–25)
CALCIUM SPEC-SCNC: 8.4 MG/DL (ref 8.6–10.5)
CHLORIDE SERPL-SCNC: 99 MMOL/L (ref 98–107)
CO2 SERPL-SCNC: 28 MMOL/L (ref 22–29)
CREAT SERPL-MCNC: 0.7 MG/DL (ref 0.76–1.27)
DEPRECATED RDW RBC AUTO: 44.1 FL (ref 37–54)
EGFRCR SERPLBLD CKD-EPI 2021: 92 ML/MIN/1.73
ERYTHROCYTE [DISTWIDTH] IN BLOOD BY AUTOMATED COUNT: 14.1 % (ref 12.3–15.4)
GLOBULIN UR ELPH-MCNC: 2.7 GM/DL
GLUCOSE SERPL-MCNC: 112 MG/DL (ref 65–99)
HCT VFR BLD AUTO: 30.8 % (ref 37.5–51)
HGB BLD-MCNC: 10.1 G/DL (ref 13–17.7)
MCH RBC QN AUTO: 28.6 PG (ref 26.6–33)
MCHC RBC AUTO-ENTMCNC: 32.8 G/DL (ref 31.5–35.7)
MCV RBC AUTO: 87.3 FL (ref 79–97)
PLATELET # BLD AUTO: 332 10*3/MM3 (ref 140–450)
PMV BLD AUTO: 9.2 FL (ref 6–12)
POTASSIUM SERPL-SCNC: 3.9 MMOL/L (ref 3.5–5.2)
PROT SERPL-MCNC: 5.5 G/DL (ref 6–8.5)
RBC # BLD AUTO: 3.53 10*6/MM3 (ref 4.14–5.8)
SODIUM SERPL-SCNC: 133 MMOL/L (ref 136–145)
WBC NRBC COR # BLD: 5.35 10*3/MM3 (ref 3.4–10.8)

## 2022-12-13 PROCEDURE — 25010000002 METHYLNALTREXONE 12 MG/0.6ML SOLUTION: Performed by: SURGERY

## 2022-12-13 PROCEDURE — 80053 COMPREHEN METABOLIC PANEL: CPT | Performed by: HOSPITALIST

## 2022-12-13 PROCEDURE — 85027 COMPLETE CBC AUTOMATED: CPT | Performed by: NURSE PRACTITIONER

## 2022-12-13 RX ORDER — BISACODYL 10 MG
10 SUPPOSITORY, RECTAL RECTAL DAILY PRN
Start: 2022-12-13

## 2022-12-13 RX ORDER — TAMSULOSIN HYDROCHLORIDE 0.4 MG/1
0.4 CAPSULE ORAL DAILY
Qty: 30 CAPSULE
Start: 2022-12-13

## 2022-12-13 RX ORDER — ENOXAPARIN SODIUM 100 MG/ML
40 INJECTION SUBCUTANEOUS EVERY 24 HOURS
Start: 2022-12-13

## 2022-12-13 RX ORDER — POLYETHYLENE GLYCOL 3350 17 G/17G
17 POWDER, FOR SOLUTION ORAL DAILY
Start: 2022-12-13

## 2022-12-13 RX ADMIN — MULTIPLE VITAMINS W/ MINERALS TAB 1 TABLET: TAB at 08:53

## 2022-12-13 RX ADMIN — METOPROLOL TARTRATE 12.5 MG: 25 TABLET, FILM COATED ORAL at 08:54

## 2022-12-13 RX ADMIN — METHYLNALTREXONE BROMIDE 6 MG: 12 INJECTION, SOLUTION SUBCUTANEOUS at 08:54

## 2022-12-13 RX ADMIN — PANTOPRAZOLE SODIUM 40 MG: 40 INJECTION, POWDER, FOR SOLUTION INTRAVENOUS at 06:09

## 2022-12-13 RX ADMIN — CLOPIDOGREL 75 MG: 75 TABLET, FILM COATED ORAL at 08:53

## 2022-12-13 RX ADMIN — TAMSULOSIN HYDROCHLORIDE 0.4 MG: 0.4 CAPSULE ORAL at 08:53

## 2022-12-13 NOTE — DISCHARGE SUMMARY
Boni Hernandez  1940    Patient Care Team:  Kiki Weiss APRN as PCP - General (Nurse Practitioner)  Mark Abraham MD as Consulting Physician (Cardiology)    Date of Admit: 11/23/2022    Date of Discharge:  12/13/2022    Discharge Diagnosis:  Lumbar spinal stenosis    Postoperative urinary retention    Spinal stenosis of lumbar region with neurogenic claudication    Severe malnutrition (HCC)    Agitation, Metabolic Encephalopathy - improved    Required PICC and TPN/lipids due to malnourishment, too confused to eat and recent ileus.    Post op hypertension - improved    Post operative ileus - resolved    Post operative leukocytosis - resolved    Immobilization syndrome    Hypovolemic, hyponatremia improved    Anemia, blood loss     Tachycardia - improved     Dehydration - improved     Acute kidney injury, prerenal from volume contraction, hypotension, constipation, ileus requiring NG tube - improved     Aspiration pneumonia vs pneumonitis - improved     Hx CAD with LAD stent - March 2021 - plavix resumed     Procedures Performed  Procedure(s):  Lumbar 3 to lumbar 4 laminectomy with fusion and instrumentation and removal of previous instrumentation at lumbar 4 to lumbar 5       Complications: None  Consultants:   Consults     Date and Time Order Name Status Description    12/6/2022  9:05 AM Inpatient Physical Medicine Rehab Consult      12/4/2022  2:05 PM Inpatient Neurology Consult Other (see comments) (confusion and change in mental status) Completed     11/28/2022 12:08 PM Inpatient Cardiology Consult      11/27/2022  2:30 PM Inpatient Nephrology Consult      11/26/2022 11:26 PM Inpatient General Surgery Consult Completed     11/26/2022  4:09 PM Inpatient Urology Consult Completed     11/26/2022  8:58 AM Inpatient Internal Medicine Consult Completed           Condition on Discharge: stable    Discharge disposition: SNF    Brief HPI: This is an 82-year-old male who was seen by Dr. Alex for complaints of severe,  worsening back and bilateral leg pain.  He underwent lumbar myelography which revealed severe lumbar canal stenosis with neurogenic claudication.  He had had a previous fusion at L4-5.  Current myelogram showed adjacent level stenosis at L3-4 with associated grade 1 spondylolisthesis.  Due to failure of conservative measures, he was brought to the operating room for the above-stated elective surgical procedure and removal of previous fusion hardware at L4-5.  Isai admission H&P for further details.    Hospital Course: The patient had been quite debilitated by his admitting problem.  He had some issues with postoperative urinary retention as well as constipation.  This soon developed into ileus requiring NG tube placement for several days and evaluation with general surgery.  CT imaging of the abdomen revealed possible findings of bowel ischemia however he was looking better clinically and he was not acidotic.  Decision was made for continuation of NG tube to low wall suction and IV hydration. NG tube was pulled out on 12/3/2022 and remained out as he did not have any episodes of vomiting.  His bowels had moved but he was still too confused and encephalopathic and at times combative to take anything by mouth.  He was started on TPN/lipids via PICC line.  He was receiving tube feeds by core track tube and the TPN was discontinued December 6.  On December 7 he seemed to be doing well and he was started on clear liquid diet and subsequently soft chew diet with chopped meats and thin liquids.  He did not have his dentures at the hospital. He was evaluated by the nutrition service and he is tolerating 50 to 100% of his meals.  He is still tolerating his diet diet well.  He is voiding but is having episodes of incontinence.  He was evaluated by urology early on for postoperative urinary retention. Dale catheter was removed a week or so ago.  The lumbar incision is well approximated.  There is no redness, swelling, or  drainage.  The patient has some minor pinkish areas to the sacrum that have been covered with a OPTi foam dressing.  During his hospitalization he was treated with IV antibiotics for suspected pneumonitis versus aspiration pneumonia.  The antibiotics have been discontinued.  He has remained afebrile and his leukocytosis has resolved.  He had some issues with anemia but that has improved as his nutrition status has improved.  He was also evaluated by the renal service for hyponatremia however the patient is currently at his baseline.  There were issues with agitation, confusion, encephalopathy.  He had been restrained couple of weeks ago but those have been discontinued as his mental status is improved.  Due to his confusion all narcotic medications were discontinued weeks ago.  He is managing his pain symptoms well with Tylenol at this point.  He was evaluated by the neurology service for the encephalopathy and started on small dose of nightly routine Zyprexa however there was a recommendation not to continue it at discharge.  The patient also has a history of hypertension however his blood pressure had been low and therefore his home blood pressure medications have been discontinued.  As he continues to recover, there may be a need for resuming these medications at some point.  He has been evaluated by the cardiology service due to his history of coronary artery disease and stent placement nearly 1 year ago.  His home dose Plavix, 75 mg was resumed on December 8.  He has remained on subcu heparin for DVT prophylaxis which has been continued after discharge until his mobility status improves. He has no lower extremity swelling or redness although he did have some mild tenderness to palpation over the weekend. Bilateral lower extremity venous Dopplers were performed on December 10, 2022 and were negative for DVT in both lower extremities. Due to his prolonged hospitalization for all of the above medical issues and  encephalopathy, the patient is quite weak overall.  He has been working with physical therapy and Occupational Therapy.  He is still not able to bear weight independently or take steps.  He was evaluated by acute rehab but deemed too weak to participate in 3 hours of therapy per day.  The patient's wife had preferred rehab center closer to home and he will therefore be transferred to [facility] for further rehabilitative care and then possibly transfer home or to an acute rehab center as his functional status improves.  The plan is for discharge today via stretcher transport service.  This has been discussed with the patient and his wife via cooperative care services. Both the patient, his wife and Dr. Alex are in agreement with the plan.    Addendum 12/13/2022: The patient was reevaluated in his room this morning.  He is alert, oriented x3.  He is doing well and feels ready for discharge to subacute rehab at [facility].  There have been no changes to the physical exam, medications or discharge summary.      Temp:  [98.3 °F (36.8 °C)-99.1 °F (37.3 °C)] 98.4 °F (36.9 °C)  Heart Rate:  [89-97] 89  Resp:  [16-18] 18  BP: (132-146)/(53-77) 135/70    Current labs:  Lab Results (last 24 hours)     Procedure Component Value Units Date/Time    CBC (No Diff) [[id number]]  (Abnormal) Collected: 12/12/22 0544    Specimen: Blood Updated: 12/12/22 0612     WBC 8.69 10*3/mm3      RBC 3.51 10*6/mm3      Hemoglobin 9.9 g/dL      Hematocrit 31.1 %      MCV 88.6 fL      MCH 28.2 pg      MCHC 31.8 g/dL      RDW 14.5 %      RDW-SD 46.4 fl      MPV 9.3 fL      Platelets 338 10*3/mm3     Comprehensive Metabolic Panel [[id number]]  (Abnormal) Collected: 12/12/22 0544    Specimen: Blood Updated: 12/12/22 0632     Glucose 114 mg/dL      BUN 13 mg/dL      Creatinine 0.73 mg/dL      Sodium 133 mmol/L      Potassium 4.0 mmol/L      Chloride 100 mmol/L      CO2 26.8 mmol/L      Calcium 8.3 mg/dL       Total Protein 5.7 g/dL      Albumin 2.80 g/dL      ALT (SGPT) 65 U/L      AST (SGOT) 36 U/L      Alkaline Phosphatase 135 U/L      Total Bilirubin 0.2 mg/dL      Globulin 2.9 gm/dL      A/G Ratio 1.0 g/dL      BUN/Creatinine Ratio 17.8     Anion Gap 6.2 mmol/L      eGFR 90.8 mL/min/1.73      Comment: National Kidney Foundation and American Society of Nephrology (ASN) Task Force recommended calculation based on the Chronic Kidney Disease Epidemiology Collaboration (CKD-EPI) equation refit without adjustment for race.       Narrative:      GFR Normal >60  Chronic Kidney Disease <60  Kidney Failure <15    The GFR formula is only valid for adults with stable renal function between ages 18 and 70.        Discharge Medications  Ben has been reviewed and narcotic consent is on file in the patient's chart.     Your medication list      ASK your doctor about these medications      Instructions Last Dose Given Next Dose Due   acetaminophen 500 MG tablet  Commonly known as: TYLENOL      Take 1,000 mg by mouth Every 6 (Six) Hours As Needed for Mild Pain .       atorvastatin 80 MG tablet  Commonly known as: LIPITOR      Take 80 mg by mouth Every Night.       carvedilol 6.25 MG tablet  Commonly known as: COREG      Take 1 tablet by mouth 2 (Two) Times a Day With Meals.       clopidogrel 75 MG tablet  Commonly known as: PLAVIX      Take 75 mg by mouth Every Evening. PT TO HOLD 5 DAYS PRIOR TO SURGERY       isosorbide mononitrate 30 MG 24 hr tablet  Commonly known as: IMDUR      Take 2 tablets by mouth Daily.       lisinopril 10 MG tablet  Commonly known as: PRINIVIL,ZESTRIL      Take 1 tablet by mouth Every Night.       multivitamin with minerals tablet tablet      Take 1 tablet by mouth Daily. TO HOLD 1 WEEK BEFORE SURGERY       nitroglycerin 0.4 MG SL tablet  Commonly known as: NITROSTAT      Place 0.4 mg under the tongue Every 5 (Five) Minutes As Needed for Chest Pain. Take no more than 3 doses in 15 minutes.        pantoprazole 40 MG EC tablet  Commonly known as: PROTONIX      Take 1 tablet by mouth Daily.              Discharge Diet:     Diet Order   Procedures   • Diet: Regular/House Diet; Texture: Soft to Chew (NDD 3); Soft to Chew: Chopped Meat; Fluid Consistency: Thin (IDDSI 0)       Activity at Discharge: Please refer to the postoperative instruction sheet that has been attached in the after visit summary for activity restrictions.      Call for: questions or concerns    Follow-up Appointments  Future Appointments   Date Time Provider Department Center   12/13/2022 11:00 AM EMS MED [facility]      Contact information for after-discharge care     Destination     [facility] .    Service: Skilled Nursing  Contact information:  [facility], [address] [place] [id number] [phone]                             Test Results Pending at Discharge     None    I discussed the discharge instructions with patient and nursing staff    RUPERT Meléndez  12/12/22  19:59 EST

## 2022-12-13 NOTE — PLAN OF CARE
Goal Outcome Evaluation:  Plan of Care Reviewed With: patient        Progress: improving  Outcome Evaluation: Rested well overnight, denies pain. D/C to rehab today.

## 2022-12-13 NOTE — CASE MANAGEMENT/SOCIAL WORK
"Physicians Statement of Medical Necessity for  Ambulance Transportation    GENERAL INFORMATION     Name: Boni Hernandez  YOB: 1940    Medicare #: [employer] Re[employer]  [id number]   Transport Date:    (Valid for round trips this date, or for scheduled repetitive trips for 60 days from the date signed below.)  Origin: [facility]    Destination: [place]: [address] [facility] [address], [address]  Is the Patient's stay covered under Medicare Part A (PPS/DRG?)Yes  Closest appropriate facility? Yes  If this a hosp-hosp transfer? No  Is this a hospice patient? No    MEDICAL NECESSITY QUESTIONAIRE    Ambulance Transportation is medically necessary only if other means of transportation are contraindicated or would be potentially harmful to the patient.  To meet this requirement, the patient must be either \"bed confined\" or suffer from a condition such that transport by means other than an ambulance is contraindicated by the patient's condition.  The following questions must be answered by the healthcare professional signing below for this form to be valid:     1) Describe the MEDICAL CONDITION (physical and/or mental) of this patient AT THE TIME OF AMBULANCE TRANSPORT that requires the patient to be transported in an ambulance, and why transport by other means is contraindicated by the patient's condition:     Lumbar spinal stenosis s/p bilateral laminectomy w/ neurolysis L4 nerve roots with instrumentation and fusion L3/4 an removal of previous instrumentation  Immobilization syndrome  Post-op ileus  Severe malnutrition    Past Medical History:   Diagnosis Date   • BPH (benign prostatic hyperplasia)    • Cataract    • Coronary artery disease    • DDD (degenerative disc disease), lumbar    • GERD (gastroesophageal reflux disease)    • History of MI (myocardial infarction)     APPROX. 2 YEARS AGO, HAS STENT, FOLLOWED BY DR LOREDO, 10/21/19 STRESS TEST AT [facility]   • [place] (hard " "of hearing)    • HTN (hypertension)    • Hyperlipidemia    • Injury of back    • Low back pain     RADIATES DOWN BOTH LEGS   • Postoperative ileus (HCC) 11/27/2022   • Spinal stenosis       Past Surgical History:   Procedure Laterality Date   • CAROTID ENDARTERECTOMY Left 2017   • CATARACT EXTRACTION WITH INTRAOCULAR LENS IMPLANT Bilateral    • COLONOSCOPY     • CORONARY ANGIOPLASTY WITH STENT PLACEMENT  2016   • DENTAL PROCEDURE      DENTAL IMPLANTS   • EPIDURAL BLOCK     • LUMBAR DISCECTOMY FUSION INSTRUMENTATION N/A 11/13/2019    Procedure: Lumbar 4 to lumbar 5 laminectomy with a posterior lateral fusion and instrumentation;  Surgeon: Matthew Alex MD;  Location: [facility];  Service: Neurosurgery   • LUMBAR FUSION N/A 11/23/2022    Procedure: Lumbar 3 to lumbar 4 laminectomy with fusion and instrumentation and removal of previous instrumentation at lumbar 4 to lumbar 5;  Surgeon: Matthew Alex MD;  Location: [facility];  Service: Robotics - Neuro;  Laterality: N/A;   • OTHER SURGICAL HISTORY  2015    PT STATES HE HAS HAD STENTS PUT IN BOTH LEGS.    • TONSILLECTOMY        2) Is this patient \"bed confined\" as defined below?No    To be \"bed confined\" the patient must satisfy all three of the following criteria:  (1) unable to get up from bed without assistance; AND (2) unable to ambulate;  AND (3) unable to sit in a chair or wheelchair.  3) Can this patient safely be transported by car or wheelchair van (I.e., may safely sit during transport, without an attendant or monitoring?)No   4. In addition to completing questions 1-3 above, please check any of the following conditions that apply*:          *Note: supporting documentation for any boxes checked must be maintained in the patient's medical records Patient is confused and Unable to tolerate seated position for time needed to transport      SIGNATURE OF PHYSICIAN OR OTHER AUTHORIZED HEALTHCARE PROFESSIONAL    I certify that the above information is " true and correct based on my evaluation of this patient, and represent that the patient requires transport by ambulance and that other forms of transport are contraindicated.  I understand that this information will be used by the Centers for Medicare and Medicaid Services (CMS) to support the determiniation of medical necessity for ambulance services, and I represent that I have personal knowledge of the patient's condition at the time of transport.       If this box is checked, I also certify that the patient is physically or mentally incapable of signing the ambulance service's claim form and that the institution with which I am affiliated has furnished care, services or assistance to the patient.  My signature below is made on behalf of the patient pursuant to 42 .36(b)(4). In accordance with 42 .37, the specific reason(s) that the patient is physically or mentally incapable of signing the claim for is as follows:     Signature of Physician or Healthcare Professional  Date/Time:        (For Scheduled repetitive transport, this form is not valid for transports performed more than 60 days after this date).                                                                                                                                            --------------------------------------------------------------------------------------------  Printed Name and Credentials of Physician or Authorized Healthcare Professional     *Form must be signed by patient's attending physician for scheduled, repetitive transports,.  For non-repetitive ambulance transports, if unable to obtain the signature of the attending physician, any of the following may sign (please select below):     Physician  Clinical Nurse Specialist  Registered Nurse     Physician Assistant  Discharge Planner  Licensed Practical Nurse     Nurse Practitioner

## 2022-12-13 NOTE — PROGRESS NOTES
Enter Query Response Below      Query Response:       Hyponatremia-clinically insignificant       If applicable, please update the problem list.      Patient: Boni Hernandez        : 1940  Account: [id number]           Admit Date:         How to Respond to this query:       a. Click New Note     b. Answer query within the yellow box.                c. Update the Problem List, if applicable.      Dr. Alex,    Patient presents for scheduled Lumbar 3 to lumbar 4 laminectomy with fusion.  hyponatremia documented in the Hospitalist note.   Sodium levels this admission include:  139  0644  148  0717  152  1745  154    1111  153    1602  153    0744  149 12/3   1159  149    0738  143    0801  143    0555  141    0524  137    0521   131    0556  132    2347  135  0504  133  0544  133  0706  Patient had serial monitoring of sodium levels,     After further review  -Hyponatremia-clinically significant  -Hyponatremia-clinically insignificant  -Other (please specify) _______  -Unable to determine    By submitting this query, we are merely seeking further clarification of documentation to accurately reflect all conditions that you are monitoring, evaluating, treating or that extend the hospitalization or utilize additional resources of care. Please utilize your independent clinical judgment when addressing the question(s) above.     This query and your response, once completed, will be entered into the legal medical record.    Sincerely,  Sonam Sparks RN, BSN  [email or web address]  Clinical Documentation Integrity Program

## 2022-12-13 NOTE — CASE MANAGEMENT/SOCIAL WORK
Continued Stay Note  [facility]     Patient Name: Boni Hernandez  MRN: [id number]  Today's Date: 12/13/2022    Admit Date: 11/23/2022    Plan: Plan is skilled bed at [facility] - precert received.   Discharge Plan     Row Name 12/13/22 1229       Plan    Plan Comments DC order noted.  S/W Najma/ Mal, precert is good and bed remains available.  S/w pt and his wife Shikha who are in agreement w/ DC to skilled bed at [facility] today.  DC summary and DC packet given to RN.  [facility] arranged for today at 1100.    Final Discharge Disposition Code 03 - skilled nursing facility (SNF)  [facility]    Final Note DC to skilled bed at [facility]. .........Halley KIRK/ DARYL               Discharge Codes    No documentation.               Expected Discharge Date and Time     Expected Discharge Date Expected Discharge Time    Dec 13, 2022             Halley Pickard RN

## 2022-12-21 ENCOUNTER — TELEPHONE (OUTPATIENT)
Dept: NEUROSURGERY | Facility: CLINIC | Age: 82
End: 2022-12-21

## 2023-01-10 ENCOUNTER — OFFICE VISIT (OUTPATIENT)
Dept: NEUROSURGERY | Facility: CLINIC | Age: 83
End: 2023-01-10
Payer: MEDICARE

## 2023-01-10 VITALS
DIASTOLIC BLOOD PRESSURE: 81 MMHG | BODY MASS INDEX: 18.89 KG/M2 | WEIGHT: 120.37 LBS | HEIGHT: 67 IN | SYSTOLIC BLOOD PRESSURE: 119 MMHG

## 2023-01-10 DIAGNOSIS — Z09 FOLLOW-UP EXAMINATION FOLLOWING SURGERY: Primary | ICD-10-CM

## 2023-01-10 PROCEDURE — 99024 POSTOP FOLLOW-UP VISIT: CPT | Performed by: NEUROLOGICAL SURGERY

## 2023-01-10 NOTE — PROGRESS NOTES
Subjective   Patient ID: Boni Hernandez is a 82 y.o. male is here today for follow-up L hip pain.    Today patient denies severe low back pain, and L hip pain. Patient is having issues with his balance    Patient, Provider, and MA are all wearing a mask in our office today    History of Present Illness    This patient returns today.  He is doing a lot better than he was when he was in the hospital.  He is still riding in a wheelchair and still has weakness in his legs but it is gradually improving.  The pain that he had in his legs preoperatively is gone.    The following portions of the patient's history were reviewed and updated as appropriate: allergies, current medications, past family history, past medical history, past social history, past surgical history and problem list.    Review of Systems   Constitutional: Negative for chills and fever.   HENT: Negative for congestion.    Genitourinary: Negative for difficulty urinating and dysuria.   Musculoskeletal: Positive for back pain and gait problem. Negative for myalgias.   Neurological: Positive for weakness. Negative for numbness.       I have reviewed the review of systems as documented by my MA.      Objective     Vitals:    01/10/23 1455   BP: 119/81   Cuff Size: Adult   Weight: 54.6 kg (120 lb 5.9 oz)   Height: 170.2 cm (67\")     Body mass index is 18.85 kg/m².    Tobacco Use: Medium Risk   • Smoking Tobacco Use: Former   • Smokeless Tobacco Use: Never   • Passive Exposure: Not on file          Physical Exam  Neurological:      Mental Status: He is alert and oriented to person, place, and time.       Neurologic Exam     Mental Status   Oriented to person, place, and time.           Assessment & Plan   Independent Review of Radiographic Studies:      I personally reviewed the images from the following studies.    There is no new imaging to review    Medical Decision Making:      I told the patient and his family that he needs to stick with his physical  therapy at this point.  I will see him back in about 6 weeks with another x-ray.    Diagnoses and all orders for this visit:    1. Follow-up examination following surgery (Primary)  -     XR Spine Lumbar 2 or 3 View; Future      Return in about 6 weeks (around 2/21/2023).

## 2023-02-21 ENCOUNTER — OFFICE VISIT (OUTPATIENT)
Dept: NEUROSURGERY | Facility: CLINIC | Age: 83
End: 2023-02-21
Payer: MEDICARE

## 2023-02-21 ENCOUNTER — HOSPITAL ENCOUNTER (OUTPATIENT)
Dept: GENERAL RADIOLOGY | Facility: HOSPITAL | Age: 83
Discharge: HOME | End: 2023-02-21
Admitting: NEUROLOGICAL SURGERY
Payer: MEDICARE

## 2023-02-21 VITALS
TEMPERATURE: 97.1 F | BODY MASS INDEX: 18.89 KG/M2 | SYSTOLIC BLOOD PRESSURE: 110 MMHG | HEIGHT: 67 IN | DIASTOLIC BLOOD PRESSURE: 79 MMHG | WEIGHT: 120.37 LBS

## 2023-02-21 DIAGNOSIS — Z09 FOLLOW-UP EXAMINATION FOLLOWING SURGERY: Primary | ICD-10-CM

## 2023-02-21 DIAGNOSIS — Z09 FOLLOW-UP EXAMINATION FOLLOWING SURGERY: ICD-10-CM

## 2023-02-21 PROCEDURE — 72100 X-RAY EXAM L-S SPINE 2/3 VWS: CPT

## 2023-02-21 PROCEDURE — 99024 POSTOP FOLLOW-UP VISIT: CPT | Performed by: NEUROLOGICAL SURGERY

## 2023-02-21 NOTE — PROGRESS NOTES
"Subjective   Patient ID: Boni Hernandez is a 82 y.o. male is here today for 6 week follow-up with a new XR Lumbar done today     Today patient states that he has B/L leg pain along with stiffness.l    Patient, Provider, and MA are all wearing a mask in our office today    History of Present Illness     This patient is making some progress but still has a lot of difficulty walking.  He has stiffness in his legs but not a lot of pain.    The following portions of the patient's history were reviewed and updated as appropriate: allergies, current medications, past family history, past medical history, past social history, past surgical history and problem list.    Review of Systems    I have reviewed the review of systems as documented by my MA.      Objective     Vitals:    02/21/23 1232   BP: 110/79   Cuff Size: Adult   Temp: 97.1 °F (36.2 °C)   Weight: 54.6 kg (120 lb 5.9 oz)   Height: 170.2 cm (67\")     Body mass index is 18.85 kg/m².    Tobacco Use: Medium Risk   • Smoking Tobacco Use: Former   • Smokeless Tobacco Use: Never   • Passive Exposure: Not on file          Physical Exam  Neurological:      Mental Status: He is alert and oriented to person, place, and time.       Neurologic Exam     Mental Status   Oriented to person, place, and time.           Assessment & Plan   Independent Review of Radiographic Studies:      I personally reviewed the images from the following studies.    I reviewed his x-rays done earlier today.  There is not yet a report.  These look okay.    Medical Decision Making:      I told the patient and his family about the imaging.  I told him that from my point of view I do not think we need to do any other imaging.  I looked at his preoperative myelogram again and this shows really severe narrowing at L3-4.  Therefore I think it will take quite a while for him to get better.  We will see him back in 3 months with another x-ray.    Diagnoses and all orders for this visit:    1. Follow-up " examination following surgery (Primary)  -     XR Spine Lumbar 2 or 3 View; Future      Return in about 3 months (around 5/21/2023).

## 2023-05-22 NOTE — PROGRESS NOTES
Subjective   Patient ID: Boni Hernandez is a 82 y.o. male is here today for 3 month follow-up with a new XR Lumbar.    Today patient states that he has mild low back pain that radiates to B/L legs    History of Present Illness    This patient returns today.  He is feeling better than when he was here the last time.  He is now 6 months out from his surgery.  He still has some back pain but it is better than it was.    The following portions of the patient's history were reviewed and updated as appropriate: allergies, current medications, past family history, past medical history, past social history, past surgical history and problem list.    Review of Systems   Constitutional: Negative for chills and fever.   HENT: Negative for congestion.    Musculoskeletal: Positive for back pain, gait problem and myalgias.   Neurological: Positive for weakness and numbness.       I reviewed the review of systems listed by the patient and discussed by my MA    Objective     There were no vitals filed for this visit.  There is no height or weight on file to calculate BMI.    Tobacco Use: Medium Risk   • Smoking Tobacco Use: Former   • Smokeless Tobacco Use: Never   • Passive Exposure: Not on file          Physical Exam  Neurological:      Mental Status: He is alert and oriented to person, place, and time.       Neurologic Exam     Mental Status   Oriented to person, place, and time.           Assessment & Plan   Independent Review of Radiographic Studies:      I personally reviewed the images from the following studies.    Reviewed his x-rays which were done today.  These have not yet been read.  I do not see any evidence of fracture or malalignment.  There appears to be good early fusion at L3-4.    Medical Decision Making:      I told the patient to gradually increase his activities.  We will see him back in about 6 months with another x-ray.    Diagnoses and all orders for this visit:    1. Spinal stenosis of lumbar region with  neurogenic claudication (Primary)  -     XR Spine Lumbar 2 or 3 View; Future      Return in about 6 months (around 11/23/2023).

## 2023-05-23 ENCOUNTER — HOSPITAL ENCOUNTER (OUTPATIENT)
Dept: GENERAL RADIOLOGY | Facility: HOSPITAL | Age: 83
Discharge: HOME | End: 2023-05-23
Admitting: NEUROLOGICAL SURGERY
Payer: MEDICARE

## 2023-05-23 ENCOUNTER — OFFICE VISIT (OUTPATIENT)
Dept: NEUROSURGERY | Facility: CLINIC | Age: 83
End: 2023-05-23
Payer: MEDICARE

## 2023-05-23 DIAGNOSIS — M48.062 SPINAL STENOSIS OF LUMBAR REGION WITH NEUROGENIC CLAUDICATION: Primary | ICD-10-CM

## 2023-05-23 DIAGNOSIS — Z09 FOLLOW-UP EXAMINATION FOLLOWING SURGERY: ICD-10-CM

## 2023-05-23 PROCEDURE — 1160F RVW MEDS BY RX/DR IN RCRD: CPT | Performed by: NEUROLOGICAL SURGERY

## 2023-05-23 PROCEDURE — 99213 OFFICE O/P EST LOW 20 MIN: CPT | Performed by: NEUROLOGICAL SURGERY

## 2023-05-23 PROCEDURE — 1159F MED LIST DOCD IN RCRD: CPT | Performed by: NEUROLOGICAL SURGERY

## 2023-05-23 PROCEDURE — 72100 X-RAY EXAM L-S SPINE 2/3 VWS: CPT

## (undated) DEVICE — ADHS LIQ MASTISOL 2/3ML

## (undated) DEVICE — ANTIBACTERIAL UNDYED BRAIDED (POLYGLACTIN 910), SYNTHETIC ABSORBABLE SUTURE: Brand: COATED VICRYL

## (undated) DEVICE — TRAP FLD MINIVAC MEGADYNE 100ML

## (undated) DEVICE — DRP MICROSCOPE 4 BINOCULAR CV 54X150IN

## (undated) DEVICE — SYR LUERLOK 30CC

## (undated) DEVICE — NEEDLE, QUINCKE, 20GX3.5": Brand: MEDLINE

## (undated) DEVICE — SMOKE EVACUATION TUBING WITH 7/8 IN TO 1/4 IN REDUCER: Brand: BUFFALO FILTER

## (undated) DEVICE — GLV SURG SENSICARE W/ALOE PF LF 7.5 STRL

## (undated) DEVICE — BG TRANSF W/COUPLER SPK 600ML

## (undated) DEVICE — DRP C/ARM 41X74IN

## (undated) DEVICE — GLV SURG BIOGEL LTX PF 7

## (undated) DEVICE — CONN TBG Y 5 IN 1 LF STRL

## (undated) DEVICE — SYR CONTRL PRESS/LO FIX/M/LL W/THMB/RNG 10ML

## (undated) DEVICE — 3.0MM PRECISION NEURO (MATCH HEAD)

## (undated) DEVICE — TOOL MR8-15BA50T MR8 15CM BAL SYMTRI 5MM: Brand: MIDAS REX MR8

## (undated) DEVICE — 3M™ STERI-STRIP™ ANTIMICROBIAL SKIN CLOSURES 1/2 INCH X 4 INCHES 50/CARTON 4 CARTONS/CASE A1847: Brand: 3M™ STERI-STRIP™

## (undated) DEVICE — TOTAL TRAY, 16FR 10ML SIL FOLEY, URN: Brand: MEDLINE

## (undated) DEVICE — ADHS SKIN DERMABOND TOP ADVANCED

## (undated) DEVICE — TOOL MR8-15MH30 MR8 15CM MATCH 3MM: Brand: MIDAS REX MR8

## (undated) DEVICE — PK NEURO SPINE 40

## (undated) DEVICE — Device

## (undated) DEVICE — INTENDED FOR TISSUE SEPARATION, AND OTHER PROCEDURES THAT REQUIRE A SHARP SURGICAL BLADE TO PUNCTURE OR CUT.: Brand: BARD-PARKER ® STAINLESS STEEL BLADES

## (undated) DEVICE — DISPOSABLE IRRIGATION BIPOLAR CORD, M1000 TYPE: Brand: KIRWAN

## (undated) DEVICE — 6.0MM PRECISION ROUND

## (undated) DEVICE — 1010 S-DRAPE TOWEL DRAPE 10/BX: Brand: STERI-DRAPE™

## (undated) DEVICE — GLV SURG BIOGEL LTX PF 6 1/2

## (undated) DEVICE — SPONGE,LAP,12"X12",XR,ST,5/PK,40PK/CS: Brand: MEDLINE

## (undated) DEVICE — TOOL MR8-31TD3030 MR8 TWST DRIL 3MMX30MM: Brand: MIDAS REX

## (undated) DEVICE — TBG PENCL TELESCP MEGADYNE SMOKE EVAC 10FT

## (undated) DEVICE — PENCL ES MEGADINE EZ/CLEAN BUTN W/HOLSTR 10FT

## (undated) DEVICE — BLOOD TRANSFUSION FILTER: Brand: HAEMONETICS

## (undated) DEVICE — APPL CHLORAPREP HI/LITE 26ML ORNG

## (undated) DEVICE — DRILL BIT 2345030M 3.0MM DRILL BIT: Brand: POWEREASE™ INSTRUMENTS

## (undated) DEVICE — CATH IV INSYTE AUTOGARD 14G 1 1/2IN ORNG

## (undated) DEVICE — TBG SXN PERFUS W TP

## (undated) DEVICE — CODMAN® SURGICAL PATTIES 3/4" X 3/4" (1.91CM X 1.91CM): Brand: CODMAN®

## (undated) DEVICE — APPL CHLORAPREP W/TINT 26ML ORNG

## (undated) DEVICE — PK ATS CUST W CARDIOTOMY RESEVOIR

## (undated) DEVICE — SPONGE,NEURO,.75"X.75",XR,STRL,LF,10/PK: Brand: MEDLINE

## (undated) DEVICE — DRSNG WND GZ PAD BORDERED 4X8IN STRL

## (undated) DEVICE — SYR CONTRL LUERLOK 10CC

## (undated) DEVICE — NDL BIOP BONE MARRW JAMSHIDI 11G 101MM

## (undated) DEVICE — SPHR MARKR STEALTH STATION

## (undated) DEVICE — PATIENT RETURN ELECTRODE, SINGLE-USE, CONTACT QUALITY MONITORING, ADULT, WITH 9FT CORD, FOR PATIENTS WEIGING OVER 33LBS. (15KG): Brand: MEGADYNE

## (undated) DEVICE — TUBING, SUCTION, 1/4" X 20', STRAIGHT: Brand: MEDLINE INDUSTRIES, INC.

## (undated) DEVICE — DRAPE,REIN 53X77,STERILE: Brand: MEDLINE

## (undated) DEVICE — IMPLANTABLE DEVICE
Type: IMPLANTABLE DEVICE | Site: SPINE LUMBAR | Status: NON-FUNCTIONAL
Removed: 2022-11-23

## (undated) DEVICE — NDL SPINE 20G 3 1/2 YEL STRL 1P/U